# Patient Record
Sex: MALE | Race: WHITE | Employment: OTHER | URBAN - NONMETROPOLITAN AREA
[De-identification: names, ages, dates, MRNs, and addresses within clinical notes are randomized per-mention and may not be internally consistent; named-entity substitution may affect disease eponyms.]

---

## 2017-02-06 ENCOUNTER — OFFICE VISIT (OUTPATIENT)
Dept: CARDIOLOGY | Age: 72
End: 2017-02-06

## 2017-02-06 VITALS
HEIGHT: 68 IN | HEART RATE: 61 BPM | DIASTOLIC BLOOD PRESSURE: 80 MMHG | SYSTOLIC BLOOD PRESSURE: 202 MMHG | BODY MASS INDEX: 34.56 KG/M2 | WEIGHT: 228 LBS

## 2017-02-06 DIAGNOSIS — E78.01 FAMILIAL HYPERCHOLESTEROLEMIA: ICD-10-CM

## 2017-02-06 DIAGNOSIS — I10 ESSENTIAL HYPERTENSION: ICD-10-CM

## 2017-02-06 DIAGNOSIS — R06.02 SOB (SHORTNESS OF BREATH): Primary | ICD-10-CM

## 2017-02-06 DIAGNOSIS — Z95.2 S/P AVR: ICD-10-CM

## 2017-02-06 PROCEDURE — 99213 OFFICE O/P EST LOW 20 MIN: CPT | Performed by: NUCLEAR MEDICINE

## 2017-02-06 PROCEDURE — 4040F PNEUMOC VAC/ADMIN/RCVD: CPT | Performed by: NUCLEAR MEDICINE

## 2017-02-06 PROCEDURE — G8419 CALC BMI OUT NRM PARAM NOF/U: HCPCS | Performed by: NUCLEAR MEDICINE

## 2017-02-06 PROCEDURE — 3017F COLORECTAL CA SCREEN DOC REV: CPT | Performed by: NUCLEAR MEDICINE

## 2017-02-06 PROCEDURE — G8598 ASA/ANTIPLAT THER USED: HCPCS | Performed by: NUCLEAR MEDICINE

## 2017-02-06 PROCEDURE — G8484 FLU IMMUNIZE NO ADMIN: HCPCS | Performed by: NUCLEAR MEDICINE

## 2017-02-06 PROCEDURE — 1036F TOBACCO NON-USER: CPT | Performed by: NUCLEAR MEDICINE

## 2017-02-06 PROCEDURE — 93000 ELECTROCARDIOGRAM COMPLETE: CPT | Performed by: NUCLEAR MEDICINE

## 2017-02-06 PROCEDURE — G8427 DOCREV CUR MEDS BY ELIG CLIN: HCPCS | Performed by: NUCLEAR MEDICINE

## 2017-02-06 PROCEDURE — 1123F ACP DISCUSS/DSCN MKR DOCD: CPT | Performed by: NUCLEAR MEDICINE

## 2017-02-06 RX ORDER — LISINOPRIL 10 MG/1
10 TABLET ORAL 2 TIMES DAILY
Qty: 60 TABLET | Refills: 6 | Status: SHIPPED | OUTPATIENT
Start: 2017-02-06 | End: 2018-01-29 | Stop reason: SDUPTHER

## 2017-02-07 ENCOUNTER — TELEPHONE (OUTPATIENT)
Dept: CARDIOLOGY | Age: 72
End: 2017-02-07

## 2017-02-22 ENCOUNTER — TELEPHONE (OUTPATIENT)
Dept: CARDIOLOGY | Age: 72
End: 2017-02-22

## 2017-04-03 ENCOUNTER — OFFICE VISIT (OUTPATIENT)
Dept: FAMILY MEDICINE CLINIC | Age: 72
End: 2017-04-03

## 2017-04-03 VITALS
DIASTOLIC BLOOD PRESSURE: 62 MMHG | HEIGHT: 68 IN | BODY MASS INDEX: 34.07 KG/M2 | SYSTOLIC BLOOD PRESSURE: 136 MMHG | HEART RATE: 70 BPM | WEIGHT: 224.8 LBS

## 2017-04-03 DIAGNOSIS — I10 ESSENTIAL HYPERTENSION: ICD-10-CM

## 2017-04-03 DIAGNOSIS — I25.10 ASHD (ARTERIOSCLEROTIC HEART DISEASE): ICD-10-CM

## 2017-04-03 DIAGNOSIS — R05.9 COUGH: ICD-10-CM

## 2017-04-03 DIAGNOSIS — M10.00 IDIOPATHIC GOUT, UNSPECIFIED CHRONICITY, UNSPECIFIED SITE: ICD-10-CM

## 2017-04-03 DIAGNOSIS — E78.00 PURE HYPERCHOLESTEROLEMIA: Primary | ICD-10-CM

## 2017-04-03 DIAGNOSIS — M15.9 PRIMARY OSTEOARTHRITIS INVOLVING MULTIPLE JOINTS: ICD-10-CM

## 2017-04-03 PROCEDURE — G8417 CALC BMI ABV UP PARAM F/U: HCPCS | Performed by: FAMILY MEDICINE

## 2017-04-03 PROCEDURE — G8427 DOCREV CUR MEDS BY ELIG CLIN: HCPCS | Performed by: FAMILY MEDICINE

## 2017-04-03 PROCEDURE — 1036F TOBACCO NON-USER: CPT | Performed by: FAMILY MEDICINE

## 2017-04-03 PROCEDURE — 1123F ACP DISCUSS/DSCN MKR DOCD: CPT | Performed by: FAMILY MEDICINE

## 2017-04-03 PROCEDURE — 99213 OFFICE O/P EST LOW 20 MIN: CPT | Performed by: FAMILY MEDICINE

## 2017-04-03 PROCEDURE — G8598 ASA/ANTIPLAT THER USED: HCPCS | Performed by: FAMILY MEDICINE

## 2017-04-03 PROCEDURE — 4040F PNEUMOC VAC/ADMIN/RCVD: CPT | Performed by: FAMILY MEDICINE

## 2017-04-03 PROCEDURE — 3017F COLORECTAL CA SCREEN DOC REV: CPT | Performed by: FAMILY MEDICINE

## 2017-04-03 RX ORDER — WARFARIN SODIUM 2 MG/1
TABLET ORAL
Qty: 270 TABLET | Refills: 1 | Status: SHIPPED | OUTPATIENT
Start: 2017-04-03 | End: 2017-09-29 | Stop reason: SDUPTHER

## 2017-04-03 RX ORDER — ALLOPURINOL 300 MG/1
300 TABLET ORAL DAILY
Qty: 90 TABLET | Refills: 1 | Status: SHIPPED | OUTPATIENT
Start: 2017-04-03 | End: 2017-09-29 | Stop reason: SDUPTHER

## 2017-04-03 RX ORDER — HYDROCHLOROTHIAZIDE 12.5 MG/1
12.5 CAPSULE, GELATIN COATED ORAL EVERY MORNING
Qty: 90 CAPSULE | Refills: 1 | Status: SHIPPED | OUTPATIENT
Start: 2017-04-03 | End: 2017-10-23 | Stop reason: SDUPTHER

## 2017-04-03 RX ORDER — WARFARIN SODIUM 5 MG/1
TABLET ORAL
Qty: 90 TABLET | Refills: 1 | Status: SHIPPED | OUTPATIENT
Start: 2017-04-03 | End: 2018-01-29 | Stop reason: SDUPTHER

## 2017-04-03 RX ORDER — METOPROLOL TARTRATE 50 MG/1
50 TABLET, FILM COATED ORAL 2 TIMES DAILY
Qty: 180 TABLET | Refills: 1 | Status: SHIPPED | OUTPATIENT
Start: 2017-04-03 | End: 2018-01-29 | Stop reason: SDUPTHER

## 2017-04-03 RX ORDER — ATORVASTATIN CALCIUM 80 MG/1
80 TABLET, FILM COATED ORAL DAILY
Qty: 30 TABLET | Refills: 5 | Status: SHIPPED | OUTPATIENT
Start: 2017-04-03 | End: 2018-01-29 | Stop reason: SDUPTHER

## 2017-04-03 ASSESSMENT — PATIENT HEALTH QUESTIONNAIRE - PHQ9
2. FEELING DOWN, DEPRESSED OR HOPELESS: 0
SUM OF ALL RESPONSES TO PHQ QUESTIONS 1-9: 0
SUM OF ALL RESPONSES TO PHQ9 QUESTIONS 1 & 2: 0
1. LITTLE INTEREST OR PLEASURE IN DOING THINGS: 0

## 2017-08-16 ENCOUNTER — OFFICE VISIT (OUTPATIENT)
Dept: CARDIOLOGY CLINIC | Age: 72
End: 2017-08-16
Payer: MEDICARE

## 2017-08-16 VITALS
HEART RATE: 68 BPM | SYSTOLIC BLOOD PRESSURE: 146 MMHG | HEIGHT: 69 IN | BODY MASS INDEX: 30.96 KG/M2 | DIASTOLIC BLOOD PRESSURE: 76 MMHG | WEIGHT: 209 LBS

## 2017-08-16 DIAGNOSIS — E78.01 FAMILIAL HYPERCHOLESTEROLEMIA: ICD-10-CM

## 2017-08-16 DIAGNOSIS — I10 ESSENTIAL HYPERTENSION: Primary | ICD-10-CM

## 2017-08-16 DIAGNOSIS — Z95.2 S/P AVR: ICD-10-CM

## 2017-08-16 PROCEDURE — 1036F TOBACCO NON-USER: CPT | Performed by: NUCLEAR MEDICINE

## 2017-08-16 PROCEDURE — G8427 DOCREV CUR MEDS BY ELIG CLIN: HCPCS | Performed by: NUCLEAR MEDICINE

## 2017-08-16 PROCEDURE — 99213 OFFICE O/P EST LOW 20 MIN: CPT | Performed by: NUCLEAR MEDICINE

## 2017-08-16 PROCEDURE — 3017F COLORECTAL CA SCREEN DOC REV: CPT | Performed by: NUCLEAR MEDICINE

## 2017-08-16 PROCEDURE — 4040F PNEUMOC VAC/ADMIN/RCVD: CPT | Performed by: NUCLEAR MEDICINE

## 2017-08-16 PROCEDURE — G8598 ASA/ANTIPLAT THER USED: HCPCS | Performed by: NUCLEAR MEDICINE

## 2017-08-16 PROCEDURE — 1123F ACP DISCUSS/DSCN MKR DOCD: CPT | Performed by: NUCLEAR MEDICINE

## 2017-08-16 PROCEDURE — G8417 CALC BMI ABV UP PARAM F/U: HCPCS | Performed by: NUCLEAR MEDICINE

## 2017-08-16 RX ORDER — TRIAMCINOLONE ACETONIDE 1 MG/G
CREAM TOPICAL PRN
COMMUNITY

## 2017-09-30 RX ORDER — WARFARIN SODIUM 2 MG/1
TABLET ORAL
Qty: 270 TABLET | Refills: 1 | Status: SHIPPED | OUTPATIENT
Start: 2017-09-30 | End: 2018-01-29 | Stop reason: SDUPTHER

## 2017-09-30 RX ORDER — ALLOPURINOL 300 MG/1
300 TABLET ORAL DAILY
Qty: 90 TABLET | Refills: 1 | Status: SHIPPED | OUTPATIENT
Start: 2017-09-30 | End: 2018-01-29 | Stop reason: SDUPTHER

## 2017-10-23 RX ORDER — HYDROCHLOROTHIAZIDE 12.5 MG/1
CAPSULE, GELATIN COATED ORAL
Qty: 90 CAPSULE | Refills: 1 | Status: SHIPPED | OUTPATIENT
Start: 2017-10-23 | End: 2018-01-29 | Stop reason: SDUPTHER

## 2017-10-23 NOTE — TELEPHONE ENCOUNTER
Leigh Burciaga called requesting a refill on the following medications:  Requested Prescriptions     Pending Prescriptions Disp Refills    hydrochlorothiazide (MICROZIDE) 12.5 MG capsule [Pharmacy Med Name: HYDROCHLOROTHIAZIDE 12.5MG CAP] 90 capsule 1     Sig: TAKE ONE CAPSULE BY MOUTH IN THE MORNING       Date of last visit: Visit date not found  Date of next visit (if applicable):11/13/2017  Date of last refill:   Pharmacy Name: [facility]

## 2018-01-29 ENCOUNTER — OFFICE VISIT (OUTPATIENT)
Dept: FAMILY MEDICINE CLINIC | Age: 73
End: 2018-01-29
Payer: MEDICARE

## 2018-01-29 VITALS
WEIGHT: 204 LBS | HEIGHT: 68 IN | BODY MASS INDEX: 30.92 KG/M2 | SYSTOLIC BLOOD PRESSURE: 132 MMHG | DIASTOLIC BLOOD PRESSURE: 74 MMHG | HEART RATE: 54 BPM

## 2018-01-29 DIAGNOSIS — I25.10 ASCVD (ARTERIOSCLEROTIC CARDIOVASCULAR DISEASE): Chronic | ICD-10-CM

## 2018-01-29 DIAGNOSIS — I10 ESSENTIAL HYPERTENSION: Primary | ICD-10-CM

## 2018-01-29 DIAGNOSIS — Z12.11 SCREENING FOR COLON CANCER: ICD-10-CM

## 2018-01-29 DIAGNOSIS — Z23 NEED FOR VACCINATION FOR STREP PNEUMONIAE: ICD-10-CM

## 2018-01-29 DIAGNOSIS — E78.00 PURE HYPERCHOLESTEROLEMIA: ICD-10-CM

## 2018-01-29 PROCEDURE — G8484 FLU IMMUNIZE NO ADMIN: HCPCS | Performed by: FAMILY MEDICINE

## 2018-01-29 PROCEDURE — G8598 ASA/ANTIPLAT THER USED: HCPCS | Performed by: FAMILY MEDICINE

## 2018-01-29 PROCEDURE — G0009 ADMIN PNEUMOCOCCAL VACCINE: HCPCS | Performed by: FAMILY MEDICINE

## 2018-01-29 PROCEDURE — 90670 PCV13 VACCINE IM: CPT | Performed by: FAMILY MEDICINE

## 2018-01-29 PROCEDURE — G8427 DOCREV CUR MEDS BY ELIG CLIN: HCPCS | Performed by: FAMILY MEDICINE

## 2018-01-29 PROCEDURE — 1123F ACP DISCUSS/DSCN MKR DOCD: CPT | Performed by: FAMILY MEDICINE

## 2018-01-29 PROCEDURE — 4040F PNEUMOC VAC/ADMIN/RCVD: CPT | Performed by: FAMILY MEDICINE

## 2018-01-29 PROCEDURE — 1036F TOBACCO NON-USER: CPT | Performed by: FAMILY MEDICINE

## 2018-01-29 PROCEDURE — G8417 CALC BMI ABV UP PARAM F/U: HCPCS | Performed by: FAMILY MEDICINE

## 2018-01-29 PROCEDURE — 3017F COLORECTAL CA SCREEN DOC REV: CPT | Performed by: FAMILY MEDICINE

## 2018-01-29 PROCEDURE — 99214 OFFICE O/P EST MOD 30 MIN: CPT | Performed by: FAMILY MEDICINE

## 2018-01-29 RX ORDER — ALLOPURINOL 300 MG/1
300 TABLET ORAL DAILY
Qty: 90 TABLET | Refills: 1 | Status: SHIPPED | OUTPATIENT
Start: 2018-01-29 | End: 2018-07-31 | Stop reason: SDUPTHER

## 2018-01-29 RX ORDER — WARFARIN SODIUM 5 MG/1
TABLET ORAL
Qty: 90 TABLET | Refills: 1 | Status: SHIPPED | OUTPATIENT
Start: 2018-01-29 | End: 2018-07-31 | Stop reason: SDUPTHER

## 2018-01-29 RX ORDER — WARFARIN SODIUM 2 MG/1
TABLET ORAL
Qty: 270 TABLET | Refills: 1 | Status: SHIPPED | OUTPATIENT
Start: 2018-01-29 | End: 2018-07-31 | Stop reason: SDUPTHER

## 2018-01-29 RX ORDER — ATORVASTATIN CALCIUM 80 MG/1
80 TABLET, FILM COATED ORAL DAILY
Qty: 90 TABLET | Refills: 1 | Status: SHIPPED | OUTPATIENT
Start: 2018-01-29 | End: 2018-07-31 | Stop reason: SDUPTHER

## 2018-01-29 RX ORDER — METOPROLOL TARTRATE 50 MG/1
50 TABLET, FILM COATED ORAL 2 TIMES DAILY
Qty: 180 TABLET | Refills: 1 | Status: SHIPPED | OUTPATIENT
Start: 2018-01-29 | End: 2018-07-31 | Stop reason: SDUPTHER

## 2018-01-29 RX ORDER — LISINOPRIL 10 MG/1
10 TABLET ORAL 2 TIMES DAILY
Qty: 180 TABLET | Refills: 1 | Status: SHIPPED | OUTPATIENT
Start: 2018-01-29 | End: 2018-07-31 | Stop reason: SDUPTHER

## 2018-01-29 RX ORDER — HYDROCHLOROTHIAZIDE 12.5 MG/1
CAPSULE, GELATIN COATED ORAL
Qty: 90 CAPSULE | Refills: 1 | Status: SHIPPED | OUTPATIENT
Start: 2018-01-29 | End: 2018-07-31 | Stop reason: SDUPTHER

## 2018-01-29 ASSESSMENT — ENCOUNTER SYMPTOMS
CHEST TIGHTNESS: 0
COLOR CHANGE: 0
NAUSEA: 0
BLOOD IN STOOL: 0
VOMITING: 0
EYE REDNESS: 0
SHORTNESS OF BREATH: 0
EYE DISCHARGE: 0
DIARRHEA: 0

## 2018-01-29 NOTE — PROGRESS NOTES
[address] [place] [address], [address]  Phone:  [phone]  Fax:  [phone]       Name: Lakesha Graner  : 1945    Chief Complaint   Patient presents with    6 Month Follow-Up     some pain in chest with breathing cold air    Hyperlipidemia     Due for colon CA screening and PNA vaccine    Hypertension       HPI:     Hang Nicolas is a 72 yo male who presents today for follow-up of hypertension, hyperlipidemia, and gout. His last lipid profile in September was controlled on Lipitor. He states that he's been having some discomfort in his chest but only when breathing in cold air. Hyperlipidemia   This is a chronic problem. The current episode started more than 1 year ago. The problem is controlled. Recent lipid tests were reviewed and are normal. Associated symptoms include chest pain (only with inhalation of very cold air). Pertinent negatives include no myalgias or shortness of breath. Current antihyperlipidemic treatment includes statins. The current treatment provides moderate improvement of lipids. There are no compliance problems. Risk factors for coronary artery disease include male sex, hypertension and dyslipidemia. Hypertension   This is a chronic problem. The current episode started more than 1 year ago. The problem is unchanged. The problem is controlled. Associated symptoms include chest pain (only with inhalation of very cold air). Pertinent negatives include no anxiety, headaches, palpitations, peripheral edema or shortness of breath. There are no associated agents to hypertension. Past treatments include diuretics and ACE inhibitors. The current treatment provides moderate improvement. There are no compliance problems.         Current Outpatient Prescriptions:     allopurinol (ZYLOPRIM) 300 MG tablet, Take 1 tablet by mouth daily, Disp: 90 tablet, Rfl: 1    hydrochlorothiazide (MICROZIDE) 12.5 MG capsule, TAKE ONE CAPSULE BY MOUTH IN THE

## 2018-05-08 ENCOUNTER — OFFICE VISIT (OUTPATIENT)
Dept: FAMILY MEDICINE CLINIC | Age: 73
End: 2018-05-08
Payer: MEDICARE

## 2018-05-08 VITALS
WEIGHT: 203 LBS | BODY MASS INDEX: 30.77 KG/M2 | HEIGHT: 68 IN | DIASTOLIC BLOOD PRESSURE: 70 MMHG | HEART RATE: 62 BPM | TEMPERATURE: 97.9 F | SYSTOLIC BLOOD PRESSURE: 118 MMHG

## 2018-05-08 DIAGNOSIS — B96.89 ACUTE BACTERIAL SINUSITIS: Primary | ICD-10-CM

## 2018-05-08 DIAGNOSIS — J01.90 ACUTE BACTERIAL SINUSITIS: Primary | ICD-10-CM

## 2018-05-08 PROCEDURE — 3017F COLORECTAL CA SCREEN DOC REV: CPT | Performed by: FAMILY MEDICINE

## 2018-05-08 PROCEDURE — G8510 SCR DEP NEG, NO PLAN REQD: HCPCS | Performed by: FAMILY MEDICINE

## 2018-05-08 PROCEDURE — G8598 ASA/ANTIPLAT THER USED: HCPCS | Performed by: FAMILY MEDICINE

## 2018-05-08 PROCEDURE — G8427 DOCREV CUR MEDS BY ELIG CLIN: HCPCS | Performed by: FAMILY MEDICINE

## 2018-05-08 PROCEDURE — G8417 CALC BMI ABV UP PARAM F/U: HCPCS | Performed by: FAMILY MEDICINE

## 2018-05-08 PROCEDURE — 1123F ACP DISCUSS/DSCN MKR DOCD: CPT | Performed by: FAMILY MEDICINE

## 2018-05-08 PROCEDURE — 1036F TOBACCO NON-USER: CPT | Performed by: FAMILY MEDICINE

## 2018-05-08 PROCEDURE — 4040F PNEUMOC VAC/ADMIN/RCVD: CPT | Performed by: FAMILY MEDICINE

## 2018-05-08 PROCEDURE — 3288F FALL RISK ASSESSMENT DOCD: CPT | Performed by: FAMILY MEDICINE

## 2018-05-08 PROCEDURE — 99213 OFFICE O/P EST LOW 20 MIN: CPT | Performed by: FAMILY MEDICINE

## 2018-05-08 RX ORDER — PREDNISONE 20 MG/1
40 TABLET ORAL DAILY
Qty: 10 TABLET | Refills: 0 | Status: SHIPPED | OUTPATIENT
Start: 2018-05-08 | End: 2018-07-31

## 2018-05-08 ASSESSMENT — ENCOUNTER SYMPTOMS
COUGH: 1
WHEEZING: 1
SORE THROAT: 1
RHINORRHEA: 1
SHORTNESS OF BREATH: 0
HEMOPTYSIS: 0

## 2018-05-08 ASSESSMENT — PATIENT HEALTH QUESTIONNAIRE - PHQ9
2. FEELING DOWN, DEPRESSED OR HOPELESS: 0
SUM OF ALL RESPONSES TO PHQ QUESTIONS 1-9: 0
1. LITTLE INTEREST OR PLEASURE IN DOING THINGS: 0
SUM OF ALL RESPONSES TO PHQ9 QUESTIONS 1 & 2: 0

## 2018-07-31 ENCOUNTER — OFFICE VISIT (OUTPATIENT)
Dept: FAMILY MEDICINE CLINIC | Age: 73
End: 2018-07-31
Payer: MEDICARE

## 2018-07-31 VITALS
SYSTOLIC BLOOD PRESSURE: 128 MMHG | WEIGHT: 215 LBS | HEIGHT: 68 IN | HEART RATE: 62 BPM | BODY MASS INDEX: 32.58 KG/M2 | DIASTOLIC BLOOD PRESSURE: 72 MMHG

## 2018-07-31 DIAGNOSIS — E78.00 PURE HYPERCHOLESTEROLEMIA: ICD-10-CM

## 2018-07-31 DIAGNOSIS — G56.01 ACUTE CARPAL TUNNEL SYNDROME OF RIGHT WRIST: ICD-10-CM

## 2018-07-31 DIAGNOSIS — M10.00 IDIOPATHIC GOUT, UNSPECIFIED CHRONICITY, UNSPECIFIED SITE: ICD-10-CM

## 2018-07-31 DIAGNOSIS — I10 ESSENTIAL HYPERTENSION: Primary | ICD-10-CM

## 2018-07-31 DIAGNOSIS — I25.10 ASCVD (ARTERIOSCLEROTIC CARDIOVASCULAR DISEASE): ICD-10-CM

## 2018-07-31 PROCEDURE — G8427 DOCREV CUR MEDS BY ELIG CLIN: HCPCS | Performed by: FAMILY MEDICINE

## 2018-07-31 PROCEDURE — 99214 OFFICE O/P EST MOD 30 MIN: CPT | Performed by: FAMILY MEDICINE

## 2018-07-31 PROCEDURE — G8417 CALC BMI ABV UP PARAM F/U: HCPCS | Performed by: FAMILY MEDICINE

## 2018-07-31 PROCEDURE — 4040F PNEUMOC VAC/ADMIN/RCVD: CPT | Performed by: FAMILY MEDICINE

## 2018-07-31 PROCEDURE — 1101F PT FALLS ASSESS-DOCD LE1/YR: CPT | Performed by: FAMILY MEDICINE

## 2018-07-31 PROCEDURE — G8598 ASA/ANTIPLAT THER USED: HCPCS | Performed by: FAMILY MEDICINE

## 2018-07-31 PROCEDURE — 1036F TOBACCO NON-USER: CPT | Performed by: FAMILY MEDICINE

## 2018-07-31 PROCEDURE — 3017F COLORECTAL CA SCREEN DOC REV: CPT | Performed by: FAMILY MEDICINE

## 2018-07-31 PROCEDURE — 1123F ACP DISCUSS/DSCN MKR DOCD: CPT | Performed by: FAMILY MEDICINE

## 2018-07-31 RX ORDER — HYDROCHLOROTHIAZIDE 12.5 MG/1
CAPSULE, GELATIN COATED ORAL
Qty: 90 CAPSULE | Refills: 1 | Status: SHIPPED | OUTPATIENT
Start: 2018-07-31 | End: 2018-10-22 | Stop reason: DRUGHIGH

## 2018-07-31 RX ORDER — LISINOPRIL 10 MG/1
10 TABLET ORAL 2 TIMES DAILY
Qty: 180 TABLET | Refills: 1 | Status: SHIPPED | OUTPATIENT
Start: 2018-07-31 | End: 2019-02-04 | Stop reason: SDUPTHER

## 2018-07-31 RX ORDER — ALLOPURINOL 300 MG/1
300 TABLET ORAL DAILY
Qty: 90 TABLET | Refills: 1 | Status: SHIPPED | OUTPATIENT
Start: 2018-07-31 | End: 2019-02-04 | Stop reason: SDUPTHER

## 2018-07-31 RX ORDER — WARFARIN SODIUM 2 MG/1
TABLET ORAL
Qty: 270 TABLET | Refills: 1 | Status: SHIPPED | OUTPATIENT
Start: 2018-07-31 | End: 2018-10-09 | Stop reason: SDUPTHER

## 2018-07-31 RX ORDER — METOPROLOL TARTRATE 50 MG/1
50 TABLET, FILM COATED ORAL 2 TIMES DAILY
Qty: 180 TABLET | Refills: 1 | Status: SHIPPED | OUTPATIENT
Start: 2018-07-31 | End: 2019-02-04 | Stop reason: SDUPTHER

## 2018-07-31 RX ORDER — ATORVASTATIN CALCIUM 80 MG/1
80 TABLET, FILM COATED ORAL DAILY
Qty: 90 TABLET | Refills: 1 | Status: SHIPPED | OUTPATIENT
Start: 2018-07-31 | End: 2018-11-13 | Stop reason: SDUPTHER

## 2018-07-31 RX ORDER — WARFARIN SODIUM 5 MG/1
TABLET ORAL
Qty: 90 TABLET | Refills: 1 | Status: SHIPPED | OUTPATIENT
Start: 2018-07-31 | End: 2018-10-09 | Stop reason: SDUPTHER

## 2018-07-31 ASSESSMENT — ENCOUNTER SYMPTOMS
VOMITING: 0
EYE REDNESS: 0
BLOOD IN STOOL: 0
NAUSEA: 0
CHEST TIGHTNESS: 0
SHORTNESS OF BREATH: 0
DIARRHEA: 0
EYE DISCHARGE: 0
COLOR CHANGE: 0

## 2018-07-31 NOTE — PROGRESS NOTES
appears well-developed and well-nourished. No distress. HENT:   Head: Normocephalic and atraumatic. Nose: Nose normal.   Eyes: Conjunctivae and EOM are normal.   Neck: Normal range of motion. Neck supple. Cardiovascular: Normal rate and regular rhythm. Pulmonary/Chest: Effort normal and breath sounds normal. No respiratory distress. He has no wheezes. Abdominal: Soft. Bowel sounds are normal.   Musculoskeletal:        Right hand: He exhibits decreased range of motion. He exhibits normal capillary refill. Normal sensation noted. Decreased strength noted. Neurological: He is alert and oriented to person, place, and time. Skin: Skin is warm and dry. No rash noted. Scar on right forearm from compartment syndrome. Psychiatric: He has a normal mood and affect. His behavior is normal.   Vitals reviewed. Assessment/Plan:     [facility] was seen today for 6 month follow-up, hypertension and hyperlipidemia. Diagnoses and all orders for this visit:    Essential hypertension  -     hydrochlorothiazide (MICROZIDE) 12.5 MG capsule; TAKE ONE CAPSULE BY MOUTH IN THE MORNING  -     metoprolol tartrate (LOPRESSOR) 50 MG tablet; Take 1 tablet by mouth 2 times daily  -     lisinopril (PRINIVIL;ZESTRIL) 10 MG tablet; Take 1 tablet by mouth 2 times daily    Pure hypercholesterolemia  -     atorvastatin (LIPITOR) 80 MG tablet; Take 1 tablet by mouth daily    ASCVD (arteriosclerotic cardiovascular disease)  -     warfarin (COUMADIN) 2 MG tablet; AS DIRECTED PER DR EATON  -     warfarin (COUMADIN) 5 MG tablet; AS DIRECTED PER DR EATON    Idiopathic gout, unspecified chronicity, unspecified site    Acute carpal tunnel syndrome of right wrist  -     [facility] MD [place]    Other orders  -     allopurinol (ZYLOPRIM) 300 MG tablet; Take 1 tablet by mouth daily    BP is stable so will continue with current medication. Will be due for labs at the health [facility] in September.   Referral given to [facility] for right carpal tunnel syndrome. Return in about 6 months (around 1/31/2019) for hypertension, hyperlipidemia.     Electronically signed by Salo Nuñez MD on 7/31/2018 at 4:43 PM

## 2018-08-15 ENCOUNTER — OFFICE VISIT (OUTPATIENT)
Dept: CARDIOLOGY CLINIC | Age: 73
End: 2018-08-15
Payer: MEDICARE

## 2018-08-15 VITALS
SYSTOLIC BLOOD PRESSURE: 150 MMHG | DIASTOLIC BLOOD PRESSURE: 78 MMHG | WEIGHT: 214.9 LBS | BODY MASS INDEX: 31.83 KG/M2 | HEART RATE: 57 BPM | HEIGHT: 69 IN

## 2018-08-15 DIAGNOSIS — Z95.2 S/P AVR: ICD-10-CM

## 2018-08-15 DIAGNOSIS — I25.10 ASHD (ARTERIOSCLEROTIC HEART DISEASE): Chronic | ICD-10-CM

## 2018-08-15 DIAGNOSIS — I25.10 ASCVD (ARTERIOSCLEROTIC CARDIOVASCULAR DISEASE): Primary | Chronic | ICD-10-CM

## 2018-08-15 PROCEDURE — 4040F PNEUMOC VAC/ADMIN/RCVD: CPT | Performed by: NUCLEAR MEDICINE

## 2018-08-15 PROCEDURE — G8427 DOCREV CUR MEDS BY ELIG CLIN: HCPCS | Performed by: NUCLEAR MEDICINE

## 2018-08-15 PROCEDURE — 1123F ACP DISCUSS/DSCN MKR DOCD: CPT | Performed by: NUCLEAR MEDICINE

## 2018-08-15 PROCEDURE — 1101F PT FALLS ASSESS-DOCD LE1/YR: CPT | Performed by: NUCLEAR MEDICINE

## 2018-08-15 PROCEDURE — G8598 ASA/ANTIPLAT THER USED: HCPCS | Performed by: NUCLEAR MEDICINE

## 2018-08-15 PROCEDURE — 3017F COLORECTAL CA SCREEN DOC REV: CPT | Performed by: NUCLEAR MEDICINE

## 2018-08-15 PROCEDURE — 93000 ELECTROCARDIOGRAM COMPLETE: CPT | Performed by: NUCLEAR MEDICINE

## 2018-08-15 PROCEDURE — 1036F TOBACCO NON-USER: CPT | Performed by: NUCLEAR MEDICINE

## 2018-08-15 PROCEDURE — 99213 OFFICE O/P EST LOW 20 MIN: CPT | Performed by: NUCLEAR MEDICINE

## 2018-08-15 PROCEDURE — G8417 CALC BMI ABV UP PARAM F/U: HCPCS | Performed by: NUCLEAR MEDICINE

## 2018-08-15 NOTE — PROGRESS NOTES
[facility]  [address]  [address] [address]  Dept: [phone]  Dept Fax: [phone]  Loc: [phone]    Visit Date: 8/15/2018    Patt Kent is a 68 y.o. male who presents today for:  Chief Complaint   Patient presents with    Check-Up    Hypertension    Cardiac Valve Problem   known AVR  Better BP at home  No chest pain   No changes in breathing  Some more dyspnea at times with exertion         HPI:  HPI  Past Medical History:   Diagnosis Date    CAD (coronary artery disease)     Clotting disorder (HCC)     anemic    Compartment syndrome (HCC)     Right arm    Heart murmur     Hyperlipidemia     Hypertension     Ventricular hypertrophy     left      Past Surgical History:   Procedure Laterality Date    AORTIC VALVE REPLACEMENT      ARM SURGERY Right     Compartment syndrome    CARPAL TUNNEL RELEASE Bilateral     CORONARY ARTERY BYPASS GRAFT      DIAGNOSTIC CARDIAC CATH LAB PROCEDURE       Family History   Problem Relation Age of Onset    Alzheimer's Disease Mother     Cancer Father     Heart Disease Brother     Hypertension Brother     Stroke Brother      Social History   Substance Use Topics    Smoking status: Never Smoker    Smokeless tobacco: Never Used    Alcohol use Yes      Comment: occasionally      Current Outpatient Prescriptions   Medication Sig Dispense Refill    allopurinol (ZYLOPRIM) 300 MG tablet Take 1 tablet by mouth daily 90 tablet 1    hydrochlorothiazide (MICROZIDE) 12.5 MG capsule TAKE ONE CAPSULE BY MOUTH IN THE MORNING 90 capsule 1    warfarin (COUMADIN) 2 MG tablet AS DIRECTED PER DR EATON 270 tablet 1    warfarin (COUMADIN) 5 MG tablet AS DIRECTED PER DR EATON 90 tablet 1    metoprolol tartrate (LOPRESSOR) 50 MG tablet Take 1 tablet by mouth 2 times daily 180 tablet 1    atorvastatin (LIPITOR) 80 MG tablet Take 1 tablet by mouth daily 90 tablet 1    lisinopril (PRINIVIL;ZESTRIL) 10 MG EKG 12 lead   3. S/P AVR     cardiac fair for now   ECG in office was done today. I reviewed the ECG. No acute findings    Plan:  No Follow-up on file. As above  Monitor Bp at home  Continue risk factor modification and medical management  Thank you for allowing me to participate in the care of your patient. Please don't hesitate to contact me regarding any further issues related to the patient care    Orders Placed:  Orders Placed This Encounter   Procedures    EKG 12 lead     Order Specific Question:   Reason for Exam?     Answer: Other       Medications Prescribed:  No orders of the defined types were placed in this encounter. Discussed use, benefit, and side effects of prescribed medications. All patient questions answered. Pt voiced understanding. Instructed to continue current medications, diet and exercise. Continue risk factor modification and medical management. Patient agreed with treatment plan. Follow up as directed.     Electronically signed by Shereen Anderson MD on 8/15/2018 at 9:23 AM

## 2018-09-20 ENCOUNTER — PROCEDURE VISIT (OUTPATIENT)
Dept: NEUROLOGY | Age: 73
End: 2018-09-20
Payer: MEDICARE

## 2018-09-20 DIAGNOSIS — G62.9 NEUROPATHY: ICD-10-CM

## 2018-09-20 DIAGNOSIS — R20.0 BILATERAL HAND NUMBNESS: ICD-10-CM

## 2018-09-20 DIAGNOSIS — G56.03 BILATERAL CARPAL TUNNEL SYNDROME: Primary | ICD-10-CM

## 2018-09-20 PROCEDURE — 95886 MUSC TEST DONE W/N TEST COMP: CPT | Performed by: PSYCHIATRY & NEUROLOGY

## 2018-09-20 PROCEDURE — 95911 NRV CNDJ TEST 9-10 STUDIES: CPT | Performed by: PSYCHIATRY & NEUROLOGY

## 2018-10-09 DIAGNOSIS — I25.10 ASCVD (ARTERIOSCLEROTIC CARDIOVASCULAR DISEASE): ICD-10-CM

## 2018-10-09 RX ORDER — WARFARIN SODIUM 2 MG/1
TABLET ORAL
Qty: 270 TABLET | Refills: 1 | Status: SHIPPED | OUTPATIENT
Start: 2018-10-09 | End: 2019-02-04 | Stop reason: SDUPTHER

## 2018-10-09 RX ORDER — WARFARIN SODIUM 5 MG/1
TABLET ORAL
Qty: 90 TABLET | Refills: 1 | Status: SHIPPED | OUTPATIENT
Start: 2018-10-09 | End: 2019-02-04 | Stop reason: SDUPTHER

## 2018-10-22 ENCOUNTER — TELEPHONE (OUTPATIENT)
Dept: FAMILY MEDICINE CLINIC | Age: 73
End: 2018-10-22

## 2018-10-22 ENCOUNTER — OFFICE VISIT (OUTPATIENT)
Dept: FAMILY MEDICINE CLINIC | Age: 73
End: 2018-10-22
Payer: MEDICARE

## 2018-10-22 ENCOUNTER — HOSPITAL ENCOUNTER (OUTPATIENT)
Age: 73
Discharge: HOME | End: 2018-10-22
Payer: MEDICARE

## 2018-10-22 VITALS
SYSTOLIC BLOOD PRESSURE: 162 MMHG | WEIGHT: 214 LBS | DIASTOLIC BLOOD PRESSURE: 70 MMHG | BODY MASS INDEX: 32.43 KG/M2 | HEART RATE: 48 BPM | TEMPERATURE: 98.6 F | HEIGHT: 68 IN

## 2018-10-22 DIAGNOSIS — R19.7 DIARRHEA OF PRESUMED INFECTIOUS ORIGIN: ICD-10-CM

## 2018-10-22 DIAGNOSIS — R09.89 PULSE IRREGULARITY: ICD-10-CM

## 2018-10-22 DIAGNOSIS — B96.89 ACUTE BACTERIAL SINUSITIS: ICD-10-CM

## 2018-10-22 DIAGNOSIS — J01.90 ACUTE BACTERIAL SINUSITIS: ICD-10-CM

## 2018-10-22 DIAGNOSIS — I25.10 ASCVD (ARTERIOSCLEROTIC CARDIOVASCULAR DISEASE): Chronic | ICD-10-CM

## 2018-10-22 DIAGNOSIS — J01.90 ACUTE BACTERIAL SINUSITIS: Primary | ICD-10-CM

## 2018-10-22 DIAGNOSIS — I10 ESSENTIAL HYPERTENSION: ICD-10-CM

## 2018-10-22 DIAGNOSIS — B96.89 ACUTE BACTERIAL SINUSITIS: Primary | ICD-10-CM

## 2018-10-22 DIAGNOSIS — R00.1 BRADYCARDIA: ICD-10-CM

## 2018-10-22 DIAGNOSIS — I10 UNCONTROLLED HYPERTENSION: ICD-10-CM

## 2018-10-22 LAB
ALBUMIN SERPL-MCNC: 4.1 G/DL (ref 3.5–5.1)
ALP BLD-CCNC: 78 U/L (ref 38–126)
ALT SERPL-CCNC: 35 U/L (ref 11–66)
ANION GAP SERPL CALCULATED.3IONS-SCNC: 12 MEQ/L (ref 8–16)
AST SERPL-CCNC: 24 U/L (ref 5–40)
BASOPHILS # BLD: 1 %
BASOPHILS ABSOLUTE: 0.1 THOU/MM3 (ref 0–0.1)
BILIRUB SERPL-MCNC: 0.6 MG/DL (ref 0.3–1.2)
BUN BLDV-MCNC: 12 MG/DL (ref 7–22)
CALCIUM SERPL-MCNC: 9.6 MG/DL (ref 8.5–10.5)
CHLORIDE BLD-SCNC: 104 MEQ/L (ref 98–111)
CHOLESTEROL, TOTAL: 170 MG/DL (ref 100–199)
CO2: 28 MEQ/L (ref 23–33)
CREAT SERPL-MCNC: 0.7 MG/DL (ref 0.4–1.2)
EKG ATRIAL RATE: 48 BPM
EKG P AXIS: 71 DEGREES
EKG P-R INTERVAL: 408 MS
EKG Q-T INTERVAL: 492 MS
EKG QRS DURATION: 104 MS
EKG QTC CALCULATION (BAZETT): 439 MS
EKG R AXIS: 39 DEGREES
EKG T AXIS: 49 DEGREES
EKG VENTRICULAR RATE: 48 BPM
EOSINOPHIL # BLD: 6.4 %
EOSINOPHILS ABSOLUTE: 0.6 THOU/MM3 (ref 0–0.4)
ERYTHROCYTE [DISTWIDTH] IN BLOOD BY AUTOMATED COUNT: 13.4 % (ref 11.5–14.5)
ERYTHROCYTE [DISTWIDTH] IN BLOOD BY AUTOMATED COUNT: 44.6 FL (ref 35–45)
GFR SERPL CREATININE-BSD FRML MDRD: > 90 ML/MIN/1.73M2
GLUCOSE BLD-MCNC: 138 MG/DL (ref 70–108)
HCT VFR BLD CALC: 43.1 % (ref 42–52)
HDLC SERPL-MCNC: 34 MG/DL
HEMOGLOBIN: 14.7 GM/DL (ref 14–18)
IMMATURE GRANS (ABS): 0.02 THOU/MM3 (ref 0–0.07)
IMMATURE GRANULOCYTES: 0.2 %
LDL CHOLESTEROL CALCULATED: 116 MG/DL
LYMPHOCYTES # BLD: 26.3 %
LYMPHOCYTES ABSOLUTE: 2.4 THOU/MM3 (ref 1–4.8)
MCH RBC QN AUTO: 31.3 PG (ref 26–33)
MCHC RBC AUTO-ENTMCNC: 34.1 GM/DL (ref 32.2–35.5)
MCV RBC AUTO: 91.9 FL (ref 80–94)
MONOCYTES # BLD: 10 %
MONOCYTES ABSOLUTE: 0.9 THOU/MM3 (ref 0.4–1.3)
NUCLEATED RED BLOOD CELLS: 0 /100 WBC
PLATELET # BLD: 207 THOU/MM3 (ref 130–400)
PMV BLD AUTO: 11.1 FL (ref 9.4–12.4)
POTASSIUM SERPL-SCNC: 4.8 MEQ/L (ref 3.5–5.2)
RBC # BLD: 4.69 MILL/MM3 (ref 4.7–6.1)
SEG NEUTROPHILS: 56.1 %
SEGMENTED NEUTROPHILS ABSOLUTE COUNT: 5.1 THOU/MM3 (ref 1.8–7.7)
SODIUM BLD-SCNC: 144 MEQ/L (ref 135–145)
TOTAL PROTEIN: 7.3 G/DL (ref 6.1–8)
TRIGL SERPL-MCNC: 98 MG/DL (ref 0–199)
WBC # BLD: 9.1 THOU/MM3 (ref 4.8–10.8)

## 2018-10-22 PROCEDURE — 4040F PNEUMOC VAC/ADMIN/RCVD: CPT | Performed by: FAMILY MEDICINE

## 2018-10-22 PROCEDURE — 99214 OFFICE O/P EST MOD 30 MIN: CPT | Performed by: FAMILY MEDICINE

## 2018-10-22 PROCEDURE — G8484 FLU IMMUNIZE NO ADMIN: HCPCS | Performed by: FAMILY MEDICINE

## 2018-10-22 PROCEDURE — 1101F PT FALLS ASSESS-DOCD LE1/YR: CPT | Performed by: FAMILY MEDICINE

## 2018-10-22 PROCEDURE — 3017F COLORECTAL CA SCREEN DOC REV: CPT | Performed by: FAMILY MEDICINE

## 2018-10-22 PROCEDURE — G8417 CALC BMI ABV UP PARAM F/U: HCPCS | Performed by: FAMILY MEDICINE

## 2018-10-22 PROCEDURE — G8598 ASA/ANTIPLAT THER USED: HCPCS | Performed by: FAMILY MEDICINE

## 2018-10-22 PROCEDURE — 80061 LIPID PANEL: CPT

## 2018-10-22 PROCEDURE — 93005 ELECTROCARDIOGRAM TRACING: CPT

## 2018-10-22 PROCEDURE — 36415 COLL VENOUS BLD VENIPUNCTURE: CPT

## 2018-10-22 PROCEDURE — 1123F ACP DISCUSS/DSCN MKR DOCD: CPT | Performed by: FAMILY MEDICINE

## 2018-10-22 PROCEDURE — 80053 COMPREHEN METABOLIC PANEL: CPT

## 2018-10-22 PROCEDURE — 85025 COMPLETE CBC W/AUTO DIFF WBC: CPT

## 2018-10-22 PROCEDURE — 1036F TOBACCO NON-USER: CPT | Performed by: FAMILY MEDICINE

## 2018-10-22 PROCEDURE — G8427 DOCREV CUR MEDS BY ELIG CLIN: HCPCS | Performed by: FAMILY MEDICINE

## 2018-10-22 RX ORDER — HYDROCHLOROTHIAZIDE 12.5 MG/1
CAPSULE, GELATIN COATED ORAL
Qty: 90 CAPSULE | Refills: 1 | Status: SHIPPED
Start: 2018-10-22 | End: 2019-02-04

## 2018-10-22 RX ORDER — AMOXICILLIN AND CLAVULANATE POTASSIUM 875; 125 MG/1; MG/1
1 TABLET, FILM COATED ORAL 2 TIMES DAILY
Qty: 20 TABLET | Refills: 0 | Status: SHIPPED | OUTPATIENT
Start: 2018-10-22 | End: 2018-11-01

## 2018-10-22 ASSESSMENT — ENCOUNTER SYMPTOMS
SORE THROAT: 0
RHINORRHEA: 1
DIARRHEA: 1
EYE REDNESS: 0
COUGH: 1
SINUS PRESSURE: 1
EYE DISCHARGE: 0
COLOR CHANGE: 0
VOMITING: 0
SWOLLEN GLANDS: 0
SHORTNESS OF BREATH: 1
NAUSEA: 0

## 2018-10-22 NOTE — PROGRESS NOTES
[address] [place] [address] Rd, [address], [place]  Phone:  [phone]  XEK:[phone]       Name: Geovanny Quiles  : 1945    Chief Complaint   Patient presents with    Chest Congestion     diarrhea, no fevers, all started 1 month ago     Cough    Congestion    Fatigue    Nasal Congestion       HPI:     Geovanny Quiles is a 68 y.o. male who presents today for evaluation of cough, chest congestion, nasal congestion, and fatigue for the past month. He's also developed non-bloody diarrhea over the past week, about 3x/day. He just feels run down. His blood pressure is elevated today but he states it was good at Ortho last week. Sinusitis   This is a new problem. The current episode started 1 to 4 weeks ago. The problem is unchanged. There has been no fever. Associated symptoms include congestion, coughing, headaches, shortness of breath and sinus pressure. Pertinent negatives include no chills, sore throat or swollen glands. Past treatments include oral decongestants. The treatment provided mild relief.        Current Outpatient Prescriptions:     hydrochlorothiazide (MICROZIDE) 12.5 MG capsule, TAKE TWO CAPSULES BY MOUTH IN THE MORNING, Disp: 90 capsule, Rfl: 1    amoxicillin-clavulanate (AUGMENTIN) 875-125 MG per tablet, Take 1 tablet by mouth 2 times daily for 10 days, Disp: 20 tablet, Rfl: 0    warfarin (COUMADIN) 2 MG tablet, AS DIRECTED PER DR EATON, Disp: 270 tablet, Rfl: 1    warfarin (COUMADIN) 5 MG tablet, AS DIRECTED PER DR EATON, Disp: 90 tablet, Rfl: 1    allopurinol (ZYLOPRIM) 300 MG tablet, Take 1 tablet by mouth daily, Disp: 90 tablet, Rfl: 1    metoprolol tartrate (LOPRESSOR) 50 MG tablet, Take 1 tablet by mouth 2 times daily, Disp: 180 tablet, Rfl: 1    atorvastatin (LIPITOR) 80 MG tablet, Take 1 tablet by mouth daily, Disp: 90 tablet, Rfl: 1    lisinopril (PRINIVIL;ZESTRIL) 10 MG tablet, Take 1 tablet by mouth 2 times daily, Disp: 180 tablet, Rfl: 1   triamcinolone (KENALOG) 0.1 % cream, Apply topically as needed Apply topically 2 times daily. , Disp: , Rfl:     aspirin 81 MG EC tablet, Take 81 mg by mouth daily. , Disp: , Rfl:     Allergies   Allergen Reactions    Erythromycin Hives    Sulfa Antibiotics Other (See Comments)     PT DOESN'T REMEMBER    Vasotec [Enalapril] Other (See Comments)     cough       Subjective:      Review of Systems   Constitutional: Positive for fatigue. Negative for chills and fever. HENT: Positive for congestion, rhinorrhea and sinus pressure. Negative for sore throat. Eyes: Negative for discharge and redness. Respiratory: Positive for cough and shortness of breath. Cardiovascular: Negative for chest pain and palpitations. Gastrointestinal: Positive for diarrhea. Negative for nausea and vomiting. Musculoskeletal: Positive for arthralgias and myalgias. Skin: Negative for color change and pallor. Neurological: Positive for headaches. Negative for dizziness. Hematological: Negative for adenopathy. Bruises/bleeds easily. Objective:     BP (!) 162/70   Pulse (!) 48   Temp 98.6 °F (37 °C)   Ht 5' 8\" (1.727 m)   Wt 214 lb (97.1 kg)   BMI 32.54 kg/m²     Physical Exam   Constitutional: He is oriented to person, place, and time. He appears well-developed. HENT:   Head: Normocephalic and atraumatic. Right Ear: Tympanic membrane, external ear and ear canal normal.   Left Ear: Tympanic membrane, external ear and ear canal normal.   Nose: Mucosal edema and rhinorrhea present. Mouth/Throat: Mucous membranes are normal. Posterior oropharyngeal erythema present. No oropharyngeal exudate. Eyes: Conjunctivae and EOM are normal.   Neck: Normal range of motion. Neck supple. Cardiovascular: Normal heart sounds. A regularly irregular rhythm present. Bradycardia present. Pulmonary/Chest: Effort normal and breath sounds normal. No respiratory distress. Abdominal: Soft.  Bowel sounds are normal. There is no tenderness. Musculoskeletal: Normal range of motion. He exhibits no tenderness or deformity. Neurological: He is alert and oriented to person, place, and time. He exhibits normal muscle tone. Coordination normal.   Skin: Skin is warm and dry. Nursing note and vitals reviewed. Assessment/Plan:     Wander Randolph was seen today for chest congestion, cough, congestion, fatigue and nasal congestion. Diagnoses and all orders for this visit:    Acute bacterial sinusitis  -     amoxicillin-clavulanate (AUGMENTIN) 875-125 MG per tablet; Take 1 tablet by mouth 2 times daily for 10 days  -     CBC Auto Differential; Future    Diarrhea of presumed infectious origin  -     Comprehensive Metabolic Panel; Future    Bradycardia  -     EKG 12 Lead; Future    Pulse irregularity  -     EKG 12 Lead; Future    Uncontrolled hypertension    ASCVD (arteriosclerotic cardiovascular disease)  -     Lipid Panel; Future    Essential hypertension  -     hydrochlorothiazide (MICROZIDE) 12.5 MG capsule; TAKE TWO CAPSULES BY MOUTH IN THE MORNING    Patient has sinusitis so will treat with antibiotics. He may continue to use OTC sinus medication. Diarrhea may be from sinus drainage. He has bradycardia today and his pulse is irregular so will check an EKG for further evaluation. He has an appointment with Cardiology Wednesday. Blood pressure is elevated so will increase HCTZ from 12.5 mg to 25 mg daily. He will return in 1 week for a nurse BP check. Return if symptoms worsen or fail to improve.     Electronically signed by Phoenix Hackett MD on 10/22/2018 at 10:27 AM

## 2018-10-24 ENCOUNTER — HOSPITAL ENCOUNTER (EMERGENCY)
Age: 73
Discharge: HOME | End: 2018-10-24
Payer: MEDICARE

## 2018-10-24 ENCOUNTER — HOSPITAL ENCOUNTER (EMERGENCY)
Dept: GENERAL RADIOLOGY | Age: 73
Discharge: HOME | End: 2018-10-24
Payer: MEDICARE

## 2018-10-24 VITALS
WEIGHT: 214 LBS | SYSTOLIC BLOOD PRESSURE: 183 MMHG | DIASTOLIC BLOOD PRESSURE: 78 MMHG | HEIGHT: 68 IN | HEART RATE: 48 BPM | RESPIRATION RATE: 18 BRPM | OXYGEN SATURATION: 97 % | TEMPERATURE: 98.5 F | BODY MASS INDEX: 32.43 KG/M2

## 2018-10-24 DIAGNOSIS — M25.561 ACUTE PAIN OF RIGHT KNEE: Primary | ICD-10-CM

## 2018-10-24 PROCEDURE — 99213 OFFICE O/P EST LOW 20 MIN: CPT | Performed by: NURSE PRACTITIONER

## 2018-10-24 PROCEDURE — 2709999900 HC NON-CHARGEABLE SUPPLY

## 2018-10-24 PROCEDURE — 99213 OFFICE O/P EST LOW 20 MIN: CPT

## 2018-10-24 PROCEDURE — 73564 X-RAY EXAM KNEE 4 OR MORE: CPT

## 2018-10-24 ASSESSMENT — PAIN DESCRIPTION - DESCRIPTORS: DESCRIPTORS: ACHING;SHOOTING

## 2018-10-24 ASSESSMENT — PAIN DESCRIPTION - LOCATION: LOCATION: KNEE

## 2018-10-24 ASSESSMENT — PAIN DESCRIPTION - PROGRESSION: CLINICAL_PROGRESSION: GRADUALLY WORSENING

## 2018-10-24 ASSESSMENT — PAIN DESCRIPTION - ONSET: ONSET: SUDDEN

## 2018-10-24 ASSESSMENT — PAIN DESCRIPTION - ORIENTATION: ORIENTATION: RIGHT

## 2018-10-24 ASSESSMENT — PAIN SCALES - GENERAL: PAINLEVEL_OUTOF10: 10

## 2018-10-24 ASSESSMENT — PAIN DESCRIPTION - PAIN TYPE: TYPE: ACUTE PAIN

## 2018-10-24 ASSESSMENT — PAIN DESCRIPTION - FREQUENCY: FREQUENCY: CONTINUOUS

## 2018-10-24 ASSESSMENT — ENCOUNTER SYMPTOMS: BACK PAIN: 0

## 2018-10-24 NOTE — ED PROVIDER NOTES
to erythromycin; sulfa antibiotics; and vasotec [enalapril]. Patients   Immunization History   Administered Date(s) Administered    Hepatitis A 02/26/1998, 01/25/1999    Pneumococcal 13-valent Conjugate (Concha Montoya) 01/29/2018       FAMILY HISTORY     Patient's family history includes Alzheimer's Disease in his mother; Cancer in his father; Heart Disease in his brother; Hypertension in his brother; Stroke in his brother. SOCIAL HISTORY     Patient  reports that he has never smoked. He has never used smokeless tobacco. He reports that he drinks alcohol. He reports that he does not use drugs. PHYSICAL EXAM     ED TRIAGE VITALS  BP: (!) 183/78, Temp: 98.5 °F (36.9 °C), Pulse: (!) 48, Resp: 18, SpO2: 97 %,Estimated body mass index is 32.54 kg/m² as calculated from the following:    Height as of this encounter: 5' 8\" (1.727 m). Weight as of this encounter: 214 lb (97.1 kg). ,No LMP for male patient. Physical Exam   Constitutional: He is oriented to person, place, and time. He appears well-developed and well-nourished. No distress. Musculoskeletal: He exhibits edema (mild swellnig right knee) and tenderness (right knee). He exhibits no deformity. Right knee: He exhibits decreased range of motion, swelling and effusion (small). He exhibits no deformity, no laceration, no erythema and normal alignment. Tenderness found. Neurological: He is alert and oriented to person, place, and time. Skin: Skin is warm and dry. No rash noted. He is not diaphoretic. No erythema. No pallor. Psychiatric: He has a normal mood and affect. His behavior is normal. Judgment and thought content normal.       DIAGNOSTIC RESULTS     Labs:No results found for this visit on 10/24/18. IMAGING:    XR KNEE RIGHT (MIN 4 VIEWS)   Final Result   Small suprapatellar effusion.  Meniscal calcifications indicating possible chondrocalcinosis, no acute fracture,            **This report has been created using voice recognition

## 2018-11-01 ENCOUNTER — APPOINTMENT (OUTPATIENT)
Dept: GENERAL RADIOLOGY | Age: 73
DRG: 244 | End: 2018-11-01
Payer: MEDICARE

## 2018-11-01 ENCOUNTER — HOSPITAL ENCOUNTER (INPATIENT)
Age: 73
LOS: 5 days | Discharge: HOME | DRG: 244 | End: 2018-11-06
Attending: FAMILY MEDICINE | Admitting: INTERNAL MEDICINE
Payer: MEDICARE

## 2018-11-01 DIAGNOSIS — I44.0 FIRST DEGREE AV BLOCK: ICD-10-CM

## 2018-11-01 DIAGNOSIS — B96.89 ACUTE BACTERIAL SINUSITIS: ICD-10-CM

## 2018-11-01 DIAGNOSIS — R55 SYNCOPE AND COLLAPSE: Primary | ICD-10-CM

## 2018-11-01 DIAGNOSIS — J01.90 ACUTE BACTERIAL SINUSITIS: ICD-10-CM

## 2018-11-01 PROBLEM — Z79.01 WARFARIN ANTICOAGULATION: Status: ACTIVE | Noted: 2018-11-01

## 2018-11-01 PROBLEM — I95.1 ORTHOSTATIC HYPOTENSION: Status: ACTIVE | Noted: 2018-11-01

## 2018-11-01 PROBLEM — I45.9 HEART BLOCK: Status: ACTIVE | Noted: 2018-11-01

## 2018-11-01 PROBLEM — I49.9 CARDIAC DYSRHYTHMIA: Status: ACTIVE | Noted: 2018-11-01

## 2018-11-01 LAB
ALBUMIN SERPL-MCNC: 3.9 G/DL (ref 3.5–5.1)
ALP BLD-CCNC: 81 U/L (ref 38–126)
ALT SERPL-CCNC: 24 U/L (ref 11–66)
AMPHETAMINE+METHAMPHETAMINE URINE SCREEN: NEGATIVE
ANION GAP SERPL CALCULATED.3IONS-SCNC: 13 MEQ/L (ref 8–16)
APTT: 42.9 SECONDS (ref 22–38)
AST SERPL-CCNC: 22 U/L (ref 5–40)
BARBITURATE QUANTITATIVE URINE: NEGATIVE
BASOPHILS # BLD: 0.6 %
BASOPHILS ABSOLUTE: 0.1 THOU/MM3 (ref 0–0.1)
BENZODIAZEPINE QUANTITATIVE URINE: NEGATIVE
BILIRUB SERPL-MCNC: 0.8 MG/DL (ref 0.3–1.2)
BILIRUBIN DIRECT: < 0.2 MG/DL (ref 0–0.3)
BILIRUBIN URINE: NEGATIVE
BLOOD, URINE: NEGATIVE
BUN BLDV-MCNC: 24 MG/DL (ref 7–22)
CALCIUM SERPL-MCNC: 10.1 MG/DL (ref 8.5–10.5)
CALCIUM SERPL-MCNC: 9.8 MG/DL (ref 8.5–10.5)
CANNABINOID QUANTITATIVE URINE: NEGATIVE
CHARACTER, URINE: CLEAR
CHLORIDE BLD-SCNC: 99 MEQ/L (ref 98–111)
CO2: 26 MEQ/L (ref 23–33)
COCAINE METABOLITE QUANTITATIVE URINE: NEGATIVE
COLOR: YELLOW
CREAT SERPL-MCNC: 0.9 MG/DL (ref 0.4–1.2)
EKG ATRIAL RATE: 52 BPM
EKG ATRIAL RATE: 57 BPM
EKG P AXIS: 41 DEGREES
EKG P AXIS: 63 DEGREES
EKG P-R INTERVAL: 464 MS
EKG P-R INTERVAL: 520 MS
EKG Q-T INTERVAL: 496 MS
EKG Q-T INTERVAL: 512 MS
EKG QRS DURATION: 140 MS
EKG QRS DURATION: 144 MS
EKG QTC CALCULATION (BAZETT): 476 MS
EKG QTC CALCULATION (BAZETT): 482 MS
EKG R AXIS: 28 DEGREES
EKG R AXIS: 4 DEGREES
EKG T AXIS: 137 DEGREES
EKG T AXIS: 97 DEGREES
EKG VENTRICULAR RATE: 52 BPM
EKG VENTRICULAR RATE: 57 BPM
EOSINOPHIL # BLD: 1.5 %
EOSINOPHILS ABSOLUTE: 0.2 THOU/MM3 (ref 0–0.4)
ERYTHROCYTE [DISTWIDTH] IN BLOOD BY AUTOMATED COUNT: 13 % (ref 11.5–14.5)
ERYTHROCYTE [DISTWIDTH] IN BLOOD BY AUTOMATED COUNT: 42.5 FL (ref 35–45)
ETHYL ALCOHOL, SERUM: < 0.01 %
GFR SERPL CREATININE-BSD FRML MDRD: 83 ML/MIN/1.73M2
GLUCOSE BLD-MCNC: 183 MG/DL (ref 70–108)
GLUCOSE URINE: NEGATIVE MG/DL
HCT VFR BLD CALC: 45.5 % (ref 42–52)
HEMOGLOBIN: 15.8 GM/DL (ref 14–18)
IMMATURE GRANS (ABS): 0.02 THOU/MM3 (ref 0–0.07)
IMMATURE GRANULOCYTES: 0.2 %
INR BLD: 3.19 (ref 0.85–1.13)
KETONES, URINE: NEGATIVE
LACTIC ACID: 1.1 MMOL/L (ref 0.5–2.2)
LEUKOCYTE ESTERASE, URINE: NEGATIVE
LIPASE: 69.2 U/L (ref 5.6–51.3)
LYMPHOCYTES # BLD: 27.6 %
LYMPHOCYTES ABSOLUTE: 3.1 THOU/MM3 (ref 1–4.8)
MAGNESIUM: 1.9 MG/DL (ref 1.6–2.4)
MCH RBC QN AUTO: 30.9 PG (ref 26–33)
MCHC RBC AUTO-ENTMCNC: 34.7 GM/DL (ref 32.2–35.5)
MCV RBC AUTO: 88.9 FL (ref 80–94)
MONOCYTES # BLD: 10.8 %
MONOCYTES ABSOLUTE: 1.2 THOU/MM3 (ref 0.4–1.3)
MRSA SCREEN RT-PCR: NEGATIVE
NITRITE, URINE: NEGATIVE
NUCLEATED RED BLOOD CELLS: 0 /100 WBC
OPIATES, URINE: NEGATIVE
OSMOLALITY CALCULATION: 284.4 MOSMOL/KG (ref 275–300)
OXYCODONE: NEGATIVE
PH UA: 5
PHENCYCLIDINE QUANTITATIVE URINE: NEGATIVE
PLATELET # BLD: 260 THOU/MM3 (ref 130–400)
PMV BLD AUTO: 10.2 FL (ref 9.4–12.4)
POTASSIUM SERPL-SCNC: 4.3 MEQ/L (ref 3.5–5.2)
PRO-BNP: 654.9 PG/ML (ref 0–900)
PROTEIN UA: NEGATIVE
RBC # BLD: 5.12 MILL/MM3 (ref 4.7–6.1)
SEG NEUTROPHILS: 59.3 %
SEGMENTED NEUTROPHILS ABSOLUTE COUNT: 6.8 THOU/MM3 (ref 1.8–7.7)
SODIUM BLD-SCNC: 138 MEQ/L (ref 135–145)
SPECIFIC GRAVITY, URINE: 1.01 (ref 1–1.03)
TOTAL PROTEIN: 7.9 G/DL (ref 6.1–8)
TROPONIN T: < 0.01 NG/ML
TROPONIN T: < 0.01 NG/ML
UROBILINOGEN, URINE: 0.2 EU/DL
VANCOMYCIN RESISTANT ENTEROCOCCUS: NEGATIVE
WBC # BLD: 11.4 THOU/MM3 (ref 4.8–10.8)

## 2018-11-01 PROCEDURE — 2580000003 HC RX 258: Performed by: INTERNAL MEDICINE

## 2018-11-01 PROCEDURE — 82248 BILIRUBIN DIRECT: CPT

## 2018-11-01 PROCEDURE — 93010 ELECTROCARDIOGRAM REPORT: CPT | Performed by: INTERNAL MEDICINE

## 2018-11-01 PROCEDURE — 93005 ELECTROCARDIOGRAM TRACING: CPT | Performed by: FAMILY MEDICINE

## 2018-11-01 PROCEDURE — 84484 ASSAY OF TROPONIN QUANT: CPT

## 2018-11-01 PROCEDURE — 87500 VANOMYCIN DNA AMP PROBE: CPT

## 2018-11-01 PROCEDURE — 2000000000 HC ICU R&B

## 2018-11-01 PROCEDURE — 85730 THROMBOPLASTIN TIME PARTIAL: CPT

## 2018-11-01 PROCEDURE — 80053 COMPREHEN METABOLIC PANEL: CPT

## 2018-11-01 PROCEDURE — 2709999900 HC NON-CHARGEABLE SUPPLY

## 2018-11-01 PROCEDURE — 6360000002 HC RX W HCPCS

## 2018-11-01 PROCEDURE — 82310 ASSAY OF CALCIUM: CPT

## 2018-11-01 PROCEDURE — 87641 MR-STAPH DNA AMP PROBE: CPT

## 2018-11-01 PROCEDURE — 2500000003 HC RX 250 WO HCPCS

## 2018-11-01 PROCEDURE — C1756 CATH, PACING, TRANSESOPH: HCPCS

## 2018-11-01 PROCEDURE — 85610 PROTHROMBIN TIME: CPT

## 2018-11-01 PROCEDURE — 83880 ASSAY OF NATRIURETIC PEPTIDE: CPT

## 2018-11-01 PROCEDURE — 6360000002 HC RX W HCPCS: Performed by: INTERNAL MEDICINE

## 2018-11-01 PROCEDURE — 6370000000 HC RX 637 (ALT 250 FOR IP): Performed by: INTERNAL MEDICINE

## 2018-11-01 PROCEDURE — 99285 EMERGENCY DEPT VISIT HI MDM: CPT

## 2018-11-01 PROCEDURE — 71046 X-RAY EXAM CHEST 2 VIEWS: CPT

## 2018-11-01 PROCEDURE — 85025 COMPLETE CBC W/AUTO DIFF WBC: CPT

## 2018-11-01 PROCEDURE — 83735 ASSAY OF MAGNESIUM: CPT

## 2018-11-01 PROCEDURE — 36415 COLL VENOUS BLD VENIPUNCTURE: CPT

## 2018-11-01 PROCEDURE — 2580000003 HC RX 258: Performed by: FAMILY MEDICINE

## 2018-11-01 PROCEDURE — G0480 DRUG TEST DEF 1-7 CLASSES: HCPCS

## 2018-11-01 PROCEDURE — 99291 CRITICAL CARE FIRST HOUR: CPT | Performed by: INTERNAL MEDICINE

## 2018-11-01 PROCEDURE — 33210 INSERT ELECTRD/PM CATH SNGL: CPT | Performed by: INTERNAL MEDICINE

## 2018-11-01 PROCEDURE — 87081 CULTURE SCREEN ONLY: CPT

## 2018-11-01 PROCEDURE — 99223 1ST HOSP IP/OBS HIGH 75: CPT | Performed by: INTERNAL MEDICINE

## 2018-11-01 PROCEDURE — 33210 INSERT ELECTRD/PM CATH SNGL: CPT

## 2018-11-01 PROCEDURE — C1894 INTRO/SHEATH, NON-LASER: HCPCS

## 2018-11-01 PROCEDURE — 81003 URINALYSIS AUTO W/O SCOPE: CPT

## 2018-11-01 PROCEDURE — 83690 ASSAY OF LIPASE: CPT

## 2018-11-01 PROCEDURE — 83605 ASSAY OF LACTIC ACID: CPT

## 2018-11-01 PROCEDURE — 80307 DRUG TEST PRSMV CHEM ANLYZR: CPT

## 2018-11-01 RX ORDER — SODIUM CHLORIDE 0.9 % (FLUSH) 0.9 %
10 SYRINGE (ML) INJECTION PRN
Status: DISCONTINUED | OUTPATIENT
Start: 2018-11-01 | End: 2018-11-01 | Stop reason: SDUPTHER

## 2018-11-01 RX ORDER — SODIUM CHLORIDE 0.9 % (FLUSH) 0.9 %
10 SYRINGE (ML) INJECTION EVERY 12 HOURS SCHEDULED
Status: DISCONTINUED | OUTPATIENT
Start: 2018-11-01 | End: 2018-11-06 | Stop reason: HOSPADM

## 2018-11-01 RX ORDER — ATROPINE SULFATE 0.4 MG/ML
0.5 AMPUL (ML) INJECTION
Status: ACTIVE | OUTPATIENT
Start: 2018-11-01 | End: 2018-11-01

## 2018-11-01 RX ORDER — WARFARIN SODIUM 3 MG/1
6 TABLET ORAL DAILY
Status: DISCONTINUED | OUTPATIENT
Start: 2018-11-01 | End: 2018-11-01 | Stop reason: DRUGHIGH

## 2018-11-01 RX ORDER — 0.9 % SODIUM CHLORIDE 0.9 %
1000 INTRAVENOUS SOLUTION INTRAVENOUS ONCE
Status: COMPLETED | OUTPATIENT
Start: 2018-11-01 | End: 2018-11-01

## 2018-11-01 RX ORDER — POTASSIUM CHLORIDE 7.45 MG/ML
10 INJECTION INTRAVENOUS PRN
Status: DISCONTINUED | OUTPATIENT
Start: 2018-11-01 | End: 2018-11-06 | Stop reason: HOSPADM

## 2018-11-01 RX ORDER — SODIUM CHLORIDE 9 MG/ML
INJECTION, SOLUTION INTRAVENOUS CONTINUOUS
Status: DISCONTINUED | OUTPATIENT
Start: 2018-11-01 | End: 2018-11-01

## 2018-11-01 RX ORDER — SODIUM CHLORIDE 0.9 % (FLUSH) 0.9 %
10 SYRINGE (ML) INJECTION EVERY 12 HOURS SCHEDULED
Status: DISCONTINUED | OUTPATIENT
Start: 2018-11-01 | End: 2018-11-01 | Stop reason: SDUPTHER

## 2018-11-01 RX ORDER — POTASSIUM CHLORIDE 20MEQ/15ML
40 LIQUID (ML) ORAL PRN
Status: DISCONTINUED | OUTPATIENT
Start: 2018-11-01 | End: 2018-11-06 | Stop reason: HOSPADM

## 2018-11-01 RX ORDER — METOPROLOL TARTRATE 50 MG/1
50 TABLET, FILM COATED ORAL 2 TIMES DAILY
Status: DISCONTINUED | OUTPATIENT
Start: 2018-11-01 | End: 2018-11-02

## 2018-11-01 RX ORDER — POTASSIUM CHLORIDE 20 MEQ/1
40 TABLET, EXTENDED RELEASE ORAL PRN
Status: DISCONTINUED | OUTPATIENT
Start: 2018-11-01 | End: 2018-11-06 | Stop reason: HOSPADM

## 2018-11-01 RX ORDER — HYDROCHLOROTHIAZIDE 12.5 MG/1
25 CAPSULE, GELATIN COATED ORAL DAILY
Status: DISCONTINUED | OUTPATIENT
Start: 2018-11-02 | End: 2018-11-01 | Stop reason: SDUPTHER

## 2018-11-01 RX ORDER — SODIUM CHLORIDE 9 MG/ML
INJECTION, SOLUTION INTRAVENOUS CONTINUOUS
Status: DISCONTINUED | OUTPATIENT
Start: 2018-11-01 | End: 2018-11-05

## 2018-11-01 RX ORDER — ONDANSETRON 2 MG/ML
4 INJECTION INTRAMUSCULAR; INTRAVENOUS EVERY 6 HOURS PRN
Status: DISCONTINUED | OUTPATIENT
Start: 2018-11-01 | End: 2018-11-05

## 2018-11-01 RX ORDER — ASPIRIN 81 MG/1
81 TABLET ORAL DAILY
Status: DISCONTINUED | OUTPATIENT
Start: 2018-11-02 | End: 2018-11-06 | Stop reason: HOSPADM

## 2018-11-01 RX ORDER — DOPAMINE HYDROCHLORIDE 160 MG/100ML
5 INJECTION, SOLUTION INTRAVENOUS CONTINUOUS
Status: DISCONTINUED | OUTPATIENT
Start: 2018-11-01 | End: 2018-11-06 | Stop reason: HOSPADM

## 2018-11-01 RX ORDER — HYDROCHLOROTHIAZIDE 25 MG/1
25 TABLET ORAL DAILY
Status: DISCONTINUED | OUTPATIENT
Start: 2018-11-02 | End: 2018-11-02

## 2018-11-01 RX ORDER — ATORVASTATIN CALCIUM 80 MG/1
80 TABLET, FILM COATED ORAL DAILY
Status: DISCONTINUED | OUTPATIENT
Start: 2018-11-02 | End: 2018-11-06 | Stop reason: HOSPADM

## 2018-11-01 RX ORDER — LISINOPRIL 10 MG/1
10 TABLET ORAL 2 TIMES DAILY
Status: DISCONTINUED | OUTPATIENT
Start: 2018-11-01 | End: 2018-11-02

## 2018-11-01 RX ORDER — ALLOPURINOL 300 MG/1
300 TABLET ORAL DAILY
Status: DISCONTINUED | OUTPATIENT
Start: 2018-11-02 | End: 2018-11-06 | Stop reason: HOSPADM

## 2018-11-01 RX ORDER — SODIUM CHLORIDE 0.9 % (FLUSH) 0.9 %
10 SYRINGE (ML) INJECTION PRN
Status: DISCONTINUED | OUTPATIENT
Start: 2018-11-01 | End: 2018-11-06 | Stop reason: HOSPADM

## 2018-11-01 RX ORDER — ACETAMINOPHEN 325 MG/1
650 TABLET ORAL EVERY 4 HOURS PRN
Status: DISCONTINUED | OUTPATIENT
Start: 2018-11-01 | End: 2018-11-05

## 2018-11-01 RX ADMIN — Medication 10 ML: at 21:36

## 2018-11-01 RX ADMIN — ACETAMINOPHEN 650 MG: 325 TABLET ORAL at 21:22

## 2018-11-01 RX ADMIN — SODIUM CHLORIDE 1000 ML: 9 INJECTION, SOLUTION INTRAVENOUS at 13:53

## 2018-11-01 RX ADMIN — SODIUM CHLORIDE: 9 INJECTION, SOLUTION INTRAVENOUS at 20:30

## 2018-11-01 RX ADMIN — ONDANSETRON HYDROCHLORIDE 4 MG: 2 SOLUTION INTRAMUSCULAR; INTRAVENOUS at 20:29

## 2018-11-01 RX ADMIN — LISINOPRIL 10 MG: 10 TABLET ORAL at 21:22

## 2018-11-01 ASSESSMENT — PAIN DESCRIPTION - PROGRESSION: CLINICAL_PROGRESSION: NOT CHANGED

## 2018-11-01 ASSESSMENT — PAIN DESCRIPTION - LOCATION
LOCATION: NECK
LOCATION: NECK

## 2018-11-01 ASSESSMENT — PAIN DESCRIPTION - FREQUENCY
FREQUENCY: CONTINUOUS
FREQUENCY: CONTINUOUS

## 2018-11-01 ASSESSMENT — PAIN DESCRIPTION - DESCRIPTORS
DESCRIPTORS: ACHING
DESCRIPTORS: ACHING

## 2018-11-01 ASSESSMENT — ENCOUNTER SYMPTOMS
WHEEZING: 0
TROUBLE SWALLOWING: 0
SORE THROAT: 0
ABDOMINAL PAIN: 0
PHOTOPHOBIA: 0
SHORTNESS OF BREATH: 0
VOMITING: 0
ABDOMINAL DISTENTION: 0
NAUSEA: 0
CONSTIPATION: 0
DIARRHEA: 0
CHEST TIGHTNESS: 0
COUGH: 0
BACK PAIN: 0
RHINORRHEA: 0

## 2018-11-01 ASSESSMENT — PAIN DESCRIPTION - PAIN TYPE
TYPE: ACUTE PAIN;SURGICAL PAIN
TYPE: ACUTE PAIN;SURGICAL PAIN

## 2018-11-01 ASSESSMENT — PAIN DESCRIPTION - ONSET
ONSET: ON-GOING
ONSET: ON-GOING

## 2018-11-01 ASSESSMENT — PAIN SCALES - GENERAL
PAINLEVEL_OUTOF10: 8
PAINLEVEL_OUTOF10: 5
PAINLEVEL_OUTOF10: 4

## 2018-11-01 ASSESSMENT — PAIN DESCRIPTION - ORIENTATION
ORIENTATION: RIGHT
ORIENTATION: RIGHT

## 2018-11-01 NOTE — BRIEF OP NOTE
[address] [place] [address]  Sedation/Analgesia Post Sedation Record    Pt Name: Jose Vazquez  Account number: [de-identified]  MRN: [id number]  YOB: 1945  Procedure Performed By: Fuad Andrea, [address]  Primary Care Physician: Duarte Pena MD  Date: 11/1/2018    POST-PROCEDURE    Physicians/Assistants: Fuad Andrea, DESTIN    Procedure Performed:Transvenous Pacemaker      Sedation/Anesthesia: Versed/ Fentanyl and 2% xylocaine local anesthesia. Estimated Blood Loss: < 50 ml. Specimens Removed: None         Disposition of Specimen: N/A        Complications: No Immediate Complications.        Post-procedure Diagnosis/Findings:     R IJ PM  Thresholds less than 1 mA  Settings: 80 ppm, 5 mA output  ICU   Bedrest  BB washout  EP consult               Fuad Andrea, [address]  Electronically signed 11/1/2018 at 7:33 PM  Interventional Cardiology

## 2018-11-01 NOTE — ED NOTES
Pt resting with easy respirations. Skin warm and dry. HR around 60. Color is normal for ethnicity.        Kaz Torres RN  11/01/18 [id number]

## 2018-11-01 NOTE — PRE SEDATION
Pulses 2+ bilaterally  Mental Status: Alert & Oriented        PLANNED PROCEDURE   []Cath  []PCI                []Pacemaker/AICD  []JANICE             []Cardioversion []Peripheral angiography/PTA  [x]Other: TVP     SEDATION  Planned agent:[x]Midazolam []Meperidine [x]Sublimaze []Morphine  []Diazepam  []Other:     ASA Classification:  []1 []2 [x]3 []4 []5  Class 1: A normal healthy patient  Class 2: Pt with mild to moderate systemic disease  Class 3: Severe systemic disease or disturbance  Class 4: Severe systemic disorders that are already life threatening. Class 5: Moribund pt with little chances of survival, for more than 24 hours. Mallampati I Airway Classification:   []1 []2 [x]3 []4    [x]Pre-procedure diagnostic studies complete and results available. Comment:    [x]Previous sedation/anesthesia experiences assessed. Comment:    [x]The patient is an appropriate candidate to undergo the planned procedure sedation and anesthesia. (Refer to nursing sedation/analgesia documentation record)  [x]Formulation and discussion of sedation/procedure plan, risks, and expectations with patient and/or responsible adult completed. [x]Patient examined immediately prior to the procedure.  (Refer to nursing sedation/analgesia documentation record)    Kyle Singletary MD   Electronically signed 11/1/2018 at 7:32 PM

## 2018-11-01 NOTE — ED PROVIDER NOTES
in the absence of a cardiologist.  Sinus bradycardia, ventricular rate 57. . . QTC of 42. No ST changes. When compared to previous EKG from October 22, 2018, persistent bradycardia with prolonged MD interval.      With the long MD interval, there is a concern for 3rd degree block. RADIOLOGY: non-plain film images(s) such as CT, Ultrasound and MRI are read by the radiologist.    XR CHEST STANDARD (2 VW)   Final Result   1. Normal heart size. Metallic sternotomy sutures. Prosthetic heart valve. 2. Minimal atelectatic/fibrotic stranding left lung base. 3. Otherwise normal chest. No acute infiltrates or effusions are seen. **This report has been created using voice recognition software. It may contain minor errors which are inherent in voice recognition technology. **      Final report electronically signed by Dr. Gavino Randolph on 11/1/2018 1:56 PM            LABS:   Labs Reviewed   CBC WITH AUTO DIFFERENTIAL - Abnormal; Notable for the following:        Result Value    WBC 11.4 (*)     All other components within normal limits   BASIC METABOLIC PANEL - Abnormal; Notable for the following:     Glucose 183 (*)     BUN 24 (*)     All other components within normal limits   APTT - Abnormal; Notable for the following:     aPTT 42.9 (*)     All other components within normal limits   PROTIME-INR - Abnormal; Notable for the following:     INR 3.19 (*)     All other components within normal limits   LIPASE - Abnormal; Notable for the following:     Lipase 69.2 (*)     All other components within normal limits   GLOMERULAR FILTRATION RATE, ESTIMATED - Abnormal; Notable for the following:     Est, Glom Filt Rate 83 (*)     All other components within normal limits   MAGNESIUM   TROPONIN   BRAIN NATRIURETIC PEPTIDE   LACTIC ACID, PLASMA   HEPATIC FUNCTION PANEL   CALCIUM   URINE DRUG SCREEN   ETHANOL   URINE RT REFLEX TO CULTURE   ANION GAP   OSMOLALITY       EMERGENCY DEPARTMENT COURSE:

## 2018-11-02 LAB
ANION GAP SERPL CALCULATED.3IONS-SCNC: 13 MEQ/L (ref 8–16)
BASOPHILS # BLD: 0.4 %
BASOPHILS ABSOLUTE: 0 THOU/MM3 (ref 0–0.1)
BUN BLDV-MCNC: 18 MG/DL (ref 7–22)
CALCIUM SERPL-MCNC: 9 MG/DL (ref 8.5–10.5)
CHLORIDE BLD-SCNC: 105 MEQ/L (ref 98–111)
CO2: 23 MEQ/L (ref 23–33)
CREAT SERPL-MCNC: 0.7 MG/DL (ref 0.4–1.2)
EOSINOPHIL # BLD: 1.1 %
EOSINOPHILS ABSOLUTE: 0.1 THOU/MM3 (ref 0–0.4)
ERYTHROCYTE [DISTWIDTH] IN BLOOD BY AUTOMATED COUNT: 13.2 % (ref 11.5–14.5)
ERYTHROCYTE [DISTWIDTH] IN BLOOD BY AUTOMATED COUNT: 42.3 FL (ref 35–45)
GFR SERPL CREATININE-BSD FRML MDRD: > 90 ML/MIN/1.73M2
GLUCOSE BLD-MCNC: 122 MG/DL (ref 70–108)
GLUCOSE BLD-MCNC: 133 MG/DL (ref 70–108)
GLUCOSE BLD-MCNC: 137 MG/DL (ref 70–108)
HCT VFR BLD CALC: 39.9 % (ref 42–52)
HEMOGLOBIN: 13.9 GM/DL (ref 14–18)
IMMATURE GRANS (ABS): 0.03 THOU/MM3 (ref 0–0.07)
IMMATURE GRANULOCYTES: 0.3 %
INR BLD: 3.79 (ref 0.85–1.13)
LYMPHOCYTES # BLD: 20 %
LYMPHOCYTES ABSOLUTE: 2 THOU/MM3 (ref 1–4.8)
MCH RBC QN AUTO: 31 PG (ref 26–33)
MCHC RBC AUTO-ENTMCNC: 34.8 GM/DL (ref 32.2–35.5)
MCV RBC AUTO: 88.9 FL (ref 80–94)
MONOCYTES # BLD: 9.3 %
MONOCYTES ABSOLUTE: 0.9 THOU/MM3 (ref 0.4–1.3)
NUCLEATED RED BLOOD CELLS: 0 /100 WBC
PLATELET # BLD: 204 THOU/MM3 (ref 130–400)
PMV BLD AUTO: 10.6 FL (ref 9.4–12.4)
POTASSIUM REFLEX MAGNESIUM: 4.2 MEQ/L (ref 3.5–5.2)
RBC # BLD: 4.49 MILL/MM3 (ref 4.7–6.1)
SEG NEUTROPHILS: 68.9 %
SEGMENTED NEUTROPHILS ABSOLUTE COUNT: 7 THOU/MM3 (ref 1.8–7.7)
SODIUM BLD-SCNC: 141 MEQ/L (ref 135–145)
TROPONIN T: < 0.01 NG/ML
WBC # BLD: 10.1 THOU/MM3 (ref 4.8–10.8)

## 2018-11-02 PROCEDURE — 6370000000 HC RX 637 (ALT 250 FOR IP): Performed by: NURSE PRACTITIONER

## 2018-11-02 PROCEDURE — 2580000003 HC RX 258: Performed by: INTERNAL MEDICINE

## 2018-11-02 PROCEDURE — 93005 ELECTROCARDIOGRAM TRACING: CPT | Performed by: INTERNAL MEDICINE

## 2018-11-02 PROCEDURE — 36415 COLL VENOUS BLD VENIPUNCTURE: CPT

## 2018-11-02 PROCEDURE — 99223 1ST HOSP IP/OBS HIGH 75: CPT | Performed by: INTERNAL MEDICINE

## 2018-11-02 PROCEDURE — 85025 COMPLETE CBC W/AUTO DIFF WBC: CPT

## 2018-11-02 PROCEDURE — 99232 SBSQ HOSP IP/OBS MODERATE 35: CPT | Performed by: INTERNAL MEDICINE

## 2018-11-02 PROCEDURE — 2709999900 HC NON-CHARGEABLE SUPPLY

## 2018-11-02 PROCEDURE — 82948 REAGENT STRIP/BLOOD GLUCOSE: CPT

## 2018-11-02 PROCEDURE — 80048 BASIC METABOLIC PNL TOTAL CA: CPT

## 2018-11-02 PROCEDURE — 99232 SBSQ HOSP IP/OBS MODERATE 35: CPT | Performed by: NURSE PRACTITIONER

## 2018-11-02 PROCEDURE — 85610 PROTHROMBIN TIME: CPT

## 2018-11-02 PROCEDURE — 2000000000 HC ICU R&B

## 2018-11-02 PROCEDURE — 84484 ASSAY OF TROPONIN QUANT: CPT

## 2018-11-02 PROCEDURE — 6370000000 HC RX 637 (ALT 250 FOR IP): Performed by: INTERNAL MEDICINE

## 2018-11-02 PROCEDURE — 93005 ELECTROCARDIOGRAM TRACING: CPT | Performed by: NURSE PRACTITIONER

## 2018-11-02 RX ORDER — DEXTROSE MONOHYDRATE 50 MG/ML
100 INJECTION, SOLUTION INTRAVENOUS PRN
Status: DISCONTINUED | OUTPATIENT
Start: 2018-11-02 | End: 2018-11-06 | Stop reason: HOSPADM

## 2018-11-02 RX ORDER — LISINOPRIL 2.5 MG/1
2.5 TABLET ORAL 2 TIMES DAILY
Status: DISCONTINUED | OUTPATIENT
Start: 2018-11-02 | End: 2018-11-02

## 2018-11-02 RX ORDER — DEXTROSE MONOHYDRATE 25 G/50ML
12.5 INJECTION, SOLUTION INTRAVENOUS PRN
Status: DISCONTINUED | OUTPATIENT
Start: 2018-11-02 | End: 2018-11-06 | Stop reason: HOSPADM

## 2018-11-02 RX ORDER — LISINOPRIL 10 MG/1
10 TABLET ORAL 2 TIMES DAILY
Status: DISCONTINUED | OUTPATIENT
Start: 2018-11-02 | End: 2018-11-06 | Stop reason: HOSPADM

## 2018-11-02 RX ORDER — NICOTINE POLACRILEX 4 MG
15 LOZENGE BUCCAL PRN
Status: DISCONTINUED | OUTPATIENT
Start: 2018-11-02 | End: 2018-11-06 | Stop reason: HOSPADM

## 2018-11-02 RX ADMIN — ACETAMINOPHEN 650 MG: 325 TABLET ORAL at 03:56

## 2018-11-02 RX ADMIN — LISINOPRIL 2.5 MG: 10 TABLET ORAL at 09:27

## 2018-11-02 RX ADMIN — ASPIRIN 81 MG: 81 TABLET, COATED ORAL at 09:26

## 2018-11-02 RX ADMIN — SODIUM CHLORIDE: 9 INJECTION, SOLUTION INTRAVENOUS at 11:03

## 2018-11-02 RX ADMIN — SODIUM CHLORIDE: 9 INJECTION, SOLUTION INTRAVENOUS at 15:05

## 2018-11-02 RX ADMIN — ATORVASTATIN CALCIUM 80 MG: 80 TABLET, FILM COATED ORAL at 19:57

## 2018-11-02 RX ADMIN — LISINOPRIL 10 MG: 10 TABLET ORAL at 16:58

## 2018-11-02 RX ADMIN — ALLOPURINOL 300 MG: 300 TABLET ORAL at 09:26

## 2018-11-02 RX ADMIN — ACETAMINOPHEN 650 MG: 325 TABLET ORAL at 19:57

## 2018-11-02 ASSESSMENT — PAIN SCALES - GENERAL
PAINLEVEL_OUTOF10: 3
PAINLEVEL_OUTOF10: 7
PAINLEVEL_OUTOF10: 3

## 2018-11-02 ASSESSMENT — PAIN DESCRIPTION - FREQUENCY
FREQUENCY: CONTINUOUS
FREQUENCY: CONTINUOUS

## 2018-11-02 ASSESSMENT — ENCOUNTER SYMPTOMS
CONSTIPATION: 0
DOUBLE VISION: 0
SHORTNESS OF BREATH: 0
SORE THROAT: 0
LEFT EYE: 0
BLURRED VISION: 0
BACK PAIN: 0
RIGHT EYE: 0
VOMITING: 0
ABDOMINAL PAIN: 0
COUGH: 0
NAUSEA: 0
WHEEZING: 0
DIARRHEA: 0

## 2018-11-02 ASSESSMENT — PAIN DESCRIPTION - LOCATION
LOCATION: NECK
LOCATION: HEAD

## 2018-11-02 ASSESSMENT — PAIN DESCRIPTION - DESCRIPTORS
DESCRIPTORS: ACHING
DESCRIPTORS: ACHING

## 2018-11-02 ASSESSMENT — PAIN DESCRIPTION - ORIENTATION: ORIENTATION: RIGHT

## 2018-11-02 ASSESSMENT — PAIN DESCRIPTION - PAIN TYPE
TYPE: ACUTE PAIN
TYPE: ACUTE PAIN;SURGICAL PAIN

## 2018-11-02 NOTE — PROGRESS NOTES
bowel sounds, no masses Extremities: no cyanosis, clubbing or edema, pulses present  Skin: warm and dry, no rash or erythema  Head: normocephalic and atraumatic  Musculoskeletal: normal range of motion, no joint swelling, deformity or tenderness  Neurological: alert, oriented, normal speech, no focal findings or movement disorder noted    Medications:    allopurinol  300 mg Oral Daily    aspirin  81 mg Oral Daily    atorvastatin  80 mg Oral Daily    lisinopril  10 mg Oral BID    metoprolol tartrate  50 mg Oral BID    sodium chloride flush  10 mL Intravenous 2 times per day    hydrochlorothiazide  25 mg Oral Daily      sodium chloride 50 mL/hr at 18 2030    DOPamine         ondansetron 4 mg Q6H PRN   magnesium sulfate 1 g PRN   potassium chloride 40 mEq PRN   Or     potassium chloride 40 mEq PRN   Or     potassium chloride 10 mEq PRN   sodium chloride flush 10 mL PRN   acetaminophen 650 mg Q4H PRN   magnesium hydroxide 30 mL Daily PRN       Diagnostics:  EK2018 17:23:29 [facility] ROUTINE RETRIEVAL  Sinus rhythm with 2nd degree A-V block (Mobitz I)  Left bundle branch block  Abnormal ECG  When compared with ECG of 2018 15:01,  Aberrant conduction is no longer Present  Sinus rhythm is now with 2nd degree A-V block (Mobitz I)    Echo:   Electronically signed by Arpita Solis MD (Interpreting   physician) on 2017 at 09:01 AM   ----------------------------------------------------------------      Findings      Mitral Valve   The mitral valve was not well visualized .   Calcification of the mitral valve noted.   Decreased mitral valve mobility noted.   Mild mitral regurgitation is present.      Aortic Valve   Normally functioning bileaflet mechanical prosthetic valve in aortic   position.   The aortic valve leaflets were not well visualized.      Tricuspid Valve   Tricuspid valve was not well visualized.   Mild tricuspid regurgitation visualized.      Pulmonic

## 2018-11-02 NOTE — PROGRESS NOTES
Urinalysis:    Lab Results   Component Value Date    NITRU NEGATIVE 11/01/2018    BLOODU NEGATIVE 11/01/2018    GLUCOSEU NEGATIVE 11/01/2018       Radiology:  XR CHEST STANDARD (2 VW)   Final Result   1. Normal heart size. Metallic sternotomy sutures. Prosthetic heart valve. 2. Minimal atelectatic/fibrotic stranding left lung base. 3. Otherwise normal chest. No acute infiltrates or effusions are seen. **This report has been created using voice recognition software. It may contain minor errors which are inherent in voice recognition technology. **      Final report electronically signed by Dr. Blayne Lopez on 11/1/2018 1:56 PM          DVT prophylaxis: [] Lovenox                                 [] SCDs                                 [x] IV Heparin when INR <2                                 [] Encourage ambulation           [x] Already on Anticoagulation - Coumadin. Code Status: Full Code    PT/OT Eval Status: when appropriate. Tele:   [x] yes             [] no        Electronically signed by DEAN Dukes CNP on 11/2/2018 at 12:22 PM     Continuing with temporary pacer until INR low enough to proceed with permanent pacemaker placement. This is tentatively scheduled for Monday. Continue with supportive care. Electronically signed by Clari Bond MD.

## 2018-11-02 NOTE — H&P
block    Xr Chest Standard (2 Vw)    Result Date: 11/1/2018  PROCEDURE: XR CHEST (2 VW) CLINICAL INFORMATION: dizziness, COMPARISON: 7/6/2009 TECHNIQUE: PA and lateral views of the chest were obtained. 1. Normal heart size. Metallic sternotomy sutures. Prosthetic heart valve. 2. Minimal atelectatic/fibrotic stranding left lung base. 3. Otherwise normal chest. No acute infiltrates or effusions are seen. **This report has been created using voice recognition software. It may contain minor errors which are inherent in voice recognition technology. ** Final report electronically signed by Dr. Gavino Randolph on 11/1/2018 1:56 PM    Xr Knee Right (min 4 Views)    Result Date: 10/24/2018  PROCEDURE: XR KNEE RIGHT (MIN 4 VIEWS) CLINICAL INFORMATION: pain,  . COMPARISON: No prior study. TECHNIQUE: 4 projections FINDINGS: No acute fracture or dislocation. Calcifications along the patellar tendon. Meniscal calcifications are present. Ossification at the lateral joint space possibly a fabella. Mild degenerative changes involve the articular surfaces. Very small suprapatellar effusion. Postsurgical changes surgical clips in the posterior medial soft tissues likely vein harvesting     Small suprapatellar effusion. Meniscal calcifications indicating possible chondrocalcinosis, no acute fracture, **This report has been created using voice recognition software. It may contain minor errors which are inherent in voice recognition technology. ** Final report electronically signed by Dr. Marti hSeth on 10/24/2018 9:50 AM           DVT prophylaxis: [x] Lovenox                                 [] SCDs                                 [] SQ Heparin                                 [] Encourage ambulation           [] Already on Anticoagulation    Code Status: Full Code      PT/OT Eval Status:     Disposition:    [x] Home       [] TCU       [] Rehab       [] Psych       [] SNF       [] [facility]       []

## 2018-11-02 NOTE — CONSULTS
[facility]'s  Consult    Patient's Name/Date of Birth: Yari Winkler / 1945 (89 y.o.)    Date: November 1, 2018     Referring Provider: ED    CHIEF COMPLAINT: Syncope      HPI: This is a pleasant 68 y.o. male presents with hx of mechanical AVR 10 years ago, CAD, who presents for LOC and dizziness. Passed out at home. Denies chest pain. Patient was in the ED, found to be bradycardiac with SR, 1st degree AVB with MO  400 ms, and LBBB; then a follow-up ECG showing Wenckebach. Monitor at times concerning for CHB. Telemetry strip showing SR with CHB and no V-escape resulting in presyncopal event that lasted 10-30 seconds. No CPR. Spontaneous recovery. Patient takes BB at home. No other AAD. Patient on Coumadin per Dr. Arya Leslie. EF 55%, mild LVH, mild MR. INR 3.2. No current chest pain, angina, GAN, orthopnea, PND, sob at rest, palpitations, LE edema, or syncope. Echo:   Results for orders placed during the hospital encounter of 02/07/17   Echocardiogram 2D/ M-Mode/ Colorflow/ Do    Narrative Transthoracic Echocardiography Report (TTE)     Demographics      Patient Name   Isai Clark  Gender              Male                  L      MR #           [id number]      Race                                                    Ethnicity      Account #      [de-identified]        Room Number      Accession      [id number]      Date of Study       02/07/2017   Number      Date of Birth  1945     Referring Physician Matt Black MD      Age            80 year(s)     Adilia Eric, MATT,                                                     RDMS, RVT                                    Interpreting        Matt Becerril MD                                 Physician     Procedure    Type of Study      TTE procedure:ECHOCARDIOGRAM COMPLETE 2D W DOPPLER W COLOR.      Procedure Atrium   Right atrial size was normal.      Right Ventricle   The right ventricular size was normal with normal systolic function and   wall thickness. Pericardial Effusion   The pericardium was normal in appearance with no evidence of a pericardial   effusion. Pleural Effusion   No evidence of pleural effusion. Aorta / Great Vessels   -Aortic root dimension within normal limits.   -The Pulmonary artery is within normal limits. -IVC size is within normal limits with normal respiratory phasic changes.      M-Mode/2D Measurements & Calculations      LV Diastolic     LV Systolic Dimension: 3.2  AV Cusp Separation: 1 cmLA   Dimension: 4.7   cm                          Dimension: 4.1 cmAO Root   cm               LV Volume Diastolic: 278 ml Dimension: 2 cm   LV FS:31.9 %     LV Volume Systolic: 41 ml   LV PW Diastolic: LV EDV/LV EDV Index: 102   1 cm             ml/47 m^2LV ESV/LV ESV   Septum           Index: 41 ml/19 m^2         RV Diastolic Dimension: 2.3   Diastolic: 1 cm  EF Calculated: 59.8 %       cm                                                   LA/Aorta: 2.05                       LVOT: 2 cm     Doppler Measurements & Calculations      MV Peak E-Wave: 121  AV Peak Velocity: 195   LVOT Peak Velocity: 105 cm/s   cm/s                 cm/s                    LVOT Mean Velocity: 81.9 cm/s   MV Peak A-Wave: 120  AV Peak Gradient: 15.21 LVOT Peak Gradient: 4   cm/s                 mmHg                    mmHgLVOT Mean Gradient: 3   MV E/A Ratio: 1.01   AV Mean Velocity: 140   mmHg   MV Peak Gradient:    cm/s   5.86 mmHg            AV Mean Gradient: 9     TV Peak E-Wave: 57.4 cm/s   MV Mean Gradient: 3  mmHg                    TV Peak A-Wave: 51.8 cm/s   mmHg                 AV VTI: 39.2 cm   MV Mean Velocity:    AV Area                 TV Peak Gradient: 1.32 mmHg   82.4 cm/s            (Continuity):2.12 cm^2  TR Velocity:246 cm/s   MV Deceleration                              TR Gradient:24.21 mmHg tablet Take 1 tablet by mouth daily 7/31/18  Yes Phoenix Hackett MD   metoprolol tartrate (LOPRESSOR) 50 MG tablet Take 1 tablet by mouth 2 times daily 7/31/18  Yes Phoenix Hackett MD   atorvastatin (LIPITOR) 80 MG tablet Take 1 tablet by mouth daily 7/31/18  Yes Phoenix Hackett MD   lisinopril (PRINIVIL;ZESTRIL) 10 MG tablet Take 1 tablet by mouth 2 times daily 7/31/18  Yes Phoenix Hackett MD   triamcinolone (KENALOG) 0.1 % cream Apply topically as needed Apply topically 2 times daily. Yes Historical Provider, MD   aspirin 81 MG EC tablet Take 81 mg by mouth daily. Yes Historical Provider, MD   Scheduled Meds:   [START ON 11/2/2018] allopurinol  300 mg Oral Daily    [START ON 11/2/2018] aspirin  81 mg Oral Daily    [START ON 11/2/2018] atorvastatin  80 mg Oral Daily    lisinopril  10 mg Oral BID    metoprolol tartrate  50 mg Oral BID    sodium chloride flush  10 mL Intravenous 2 times per day    [START ON 11/2/2018] hydrochlorothiazide  25 mg Oral Daily     Continuous Infusions:   sodium chloride      DOPamine      sodium chloride       PRN Meds:.ondansetron, magnesium sulfate, potassium chloride **OR** potassium chloride **OR** potassium chloride, sodium chloride flush, acetaminophen, atropine, magnesium hydroxide    Allergies   Allergen Reactions    Erythromycin Hives    Sulfa Antibiotics Other (See Comments)     PT DOESN'T REMEMBER    Vasotec [Enalapril] Other (See Comments)     cough     Family History   Problem Relation Age of Onset    Alzheimer's Disease Mother     Cancer Father     Heart Disease Brother     Hypertension Brother     Stroke Brother      Social History     Social History    Marital status:      Spouse name: N/A    Number of children: N/A    Years of education: N/A     Occupational History    Not on file.      Social History Main Topics    Smoking status: Never Smoker    Smokeless tobacco: Never Used    Alcohol use Yes      Comment: GLUCOSEU NEGATIVE 11/01/2018         Physical Exam:  Vitals:    11/01/18 2040   BP: (!) 155/75   Pulse: 80   Resp: 14   Temp:    SpO2: 94%      Intake/Output Summary (Last 24 hours) at 11/01/18 2053  Last data filed at 11/01/18 1553   Gross per 24 hour   Intake             1000 ml   Output                0 ml   Net             1000 ml      General:  No acute distress  Neck: Supple, no JVD, no carotid bruits  Heart: Bradycardic, regular, mechanical S2  Lungs: clear to ascultation no rales, wheezes, or rhonchi  Abdomen: positive bowel sounds, soft, non-tender, non-distended, no bruits, no masses  Extremities:no clubbing, cyanosis or edema  Neurologic: alert and oriented x 3, cranial nerves 2-12 grossly intact, motor and sensory intact, moving all extremities  Skin: No rashes    Assessment:  [facility]  S/p mechanical AVR  CAD - 2016 Lexiscan without ischemia  On Chronic BB therapy  Preserved EF    Plan:  1. Emergency TVP pacemaker placement with ultrasound guidance in the R IJ given elevated INR  2. Hold Coumadin  3. Start Heparin once INR < 2.5 in anticipation for possible PM due to mechanical AVR  4. Hold BB  5. ICU care  6. Re-check rhythm tomorrow off BB therapy, if still in CHB, consult EP for PM implantation  7. Pacer pads on  8. Atropine at bedside  9. Further recommendations based on results and clinical course    Thank you for allowing us to participate in the care of this patient. Please do not hesitate to call us with questions. Greater than 60 minutes of time was spent managing critical issues, discussing with the family, and coordinating care.       Electronically signed by Silvana Ly MD on 11/1/2018 at 8:53 PM    Interventional Cardiology - [facility]

## 2018-11-02 NOTE — CONSULTS
was wearing telemetry when the second episode occurred and it showed transient ventricular asystole. The patient's resting ECG shows LBBB, with a IN interval greater than 500 ms. He has a TVP in place. I explained to the patient that he now requires a permanent pacemaker. I also recommend since the patient is 68years old ans will be pacing 100% of the time from the RV, that I implant a biventricular pacemaker to reduce the risk of the patient developing RV pacing induced cardiomyopathy. I explained to the patient how the procedure is performed and the benefits and potential complications. The patient and his wife understood the information and consents to the plan of care. I also discussed the issue of anticoagulation. The coumadin was stopped. Since he has a normal EF and the valve is in the aortic position, there is a very low risk of valve thrombosis and I do not recommend covering with Lovenox or IV heparin. The patient and his wife understood this information as well. PLAN:  1. Continue backup pacing with external TVP. 2. Allow INR to drift to normal or near normal level. 3. Insertion of an Abbott biventricular pacemaker on Monday.            Electronically signed by Narcisa Doss MD on 11/2/2018 at 12:37 PM

## 2018-11-02 NOTE — PROCEDURES
and amenable to the plan.       Eric Scruggs MD    D: 11/02/2018 9:16:40       T: 11/02/2018 10:04:39     [email or web address]  Job#: [id number]     Doc#: [id number]    CC:

## 2018-11-02 NOTE — PROGRESS NOTES
2015 - Patient arrived to unit from cath lab via bed. Patient admitted to 4D-17 and placed on continuous ICU bedside monitor. Patient observed to be ventricular paced with HR paced at 80 bpm. Vitals obtained and charted. Assessment completed at this time. See flowsheets for assessment details. Patient alert and responsive at time of admission. Dressing to right IJ introducer noted to be clean, dry and intact. 0.9% NaCl infusing at a rate of 20mL/hr via side port of introducer. 0.9% NaCl infusing into 20g peripheral IV in left antecubital at a rate of 50mL/hr. Patient c/o discomfort at right IJ introducer site. 2029 - Patient c/o nausea at this time. Patient states \"feel like throwing up. \" Patient's HOB elevated to 45 degrees. Emesis bag provided to patient. PRN Zofran administered, per order, at this time and effective in alleviating nausea. Cool wash cloth applied to forehead. 2035 - Patient's spouse at bedside at this time. All questions answered. Back-up pacer placed at bedside with new battery placed in back-up pacer. External pacer pads remain on patient at this time. 2044 Sherman Lanier PA-C messaged via [employer] and updated regarding patient's admission and placement of temporary pacemaker per Dr. Sejal Garcia. Clarification requested regarding coumadin order, IVF rate and blood pressure medications ordered. Updated regarding current vital signs as well. Awaiting response at present time. 2048 - [facility] orders received to hold Lopressor, okay to administer ordered Lisinopril, D/C order for 0.9% NaCl at 75mL/hr and have 0.9% NaCl infuse at a rate of 50mL/hr and to hold coumadin tonight and check PT/INR in AM.     2053 - [facility], as patient has a mechanical valve. Updated regarding most recent INR level. Awaiting response at present.      2054 - Received return response from

## 2018-11-03 LAB
ANION GAP SERPL CALCULATED.3IONS-SCNC: 10 MEQ/L (ref 8–16)
BASOPHILS # BLD: 0.6 %
BASOPHILS ABSOLUTE: 0.1 THOU/MM3 (ref 0–0.1)
BUN BLDV-MCNC: 11 MG/DL (ref 7–22)
CALCIUM SERPL-MCNC: 8.7 MG/DL (ref 8.5–10.5)
CHLORIDE BLD-SCNC: 106 MEQ/L (ref 98–111)
CO2: 24 MEQ/L (ref 23–33)
CREAT SERPL-MCNC: 0.6 MG/DL (ref 0.4–1.2)
EOSINOPHIL # BLD: 1 %
EOSINOPHILS ABSOLUTE: 0.1 THOU/MM3 (ref 0–0.4)
ERYTHROCYTE [DISTWIDTH] IN BLOOD BY AUTOMATED COUNT: 13 % (ref 11.5–14.5)
ERYTHROCYTE [DISTWIDTH] IN BLOOD BY AUTOMATED COUNT: 42.7 FL (ref 35–45)
GFR SERPL CREATININE-BSD FRML MDRD: > 90 ML/MIN/1.73M2
GLUCOSE BLD-MCNC: 101 MG/DL (ref 70–108)
GLUCOSE BLD-MCNC: 119 MG/DL (ref 70–108)
GLUCOSE BLD-MCNC: 123 MG/DL (ref 70–108)
GLUCOSE BLD-MCNC: 125 MG/DL (ref 70–108)
GLUCOSE BLD-MCNC: 132 MG/DL (ref 70–108)
HCT VFR BLD CALC: 39.1 % (ref 42–52)
HEMOGLOBIN: 13.5 GM/DL (ref 14–18)
IMMATURE GRANS (ABS): 0.03 THOU/MM3 (ref 0–0.07)
IMMATURE GRANULOCYTES: 0.4 %
LV EF: 48 %
LVEF MODALITY: NORMAL
LYMPHOCYTES # BLD: 20.5 %
LYMPHOCYTES ABSOLUTE: 1.7 THOU/MM3 (ref 1–4.8)
MCH RBC QN AUTO: 31 PG (ref 26–33)
MCHC RBC AUTO-ENTMCNC: 34.5 GM/DL (ref 32.2–35.5)
MCV RBC AUTO: 89.7 FL (ref 80–94)
MONOCYTES # BLD: 9.5 %
MONOCYTES ABSOLUTE: 0.8 THOU/MM3 (ref 0.4–1.3)
MRSA SCREEN: NORMAL
NUCLEATED RED BLOOD CELLS: 0 /100 WBC
PLATELET # BLD: 224 THOU/MM3 (ref 130–400)
PMV BLD AUTO: 10.2 FL (ref 9.4–12.4)
POTASSIUM SERPL-SCNC: 3.9 MEQ/L (ref 3.5–5.2)
RBC # BLD: 4.36 MILL/MM3 (ref 4.7–6.1)
SEG NEUTROPHILS: 68 %
SEGMENTED NEUTROPHILS ABSOLUTE COUNT: 5.7 THOU/MM3 (ref 1.8–7.7)
SODIUM BLD-SCNC: 140 MEQ/L (ref 135–145)
WBC # BLD: 8.4 THOU/MM3 (ref 4.8–10.8)

## 2018-11-03 PROCEDURE — 2000000000 HC ICU R&B

## 2018-11-03 PROCEDURE — 6370000000 HC RX 637 (ALT 250 FOR IP): Performed by: INTERNAL MEDICINE

## 2018-11-03 PROCEDURE — 99233 SBSQ HOSP IP/OBS HIGH 50: CPT | Performed by: INTERNAL MEDICINE

## 2018-11-03 PROCEDURE — 80048 BASIC METABOLIC PNL TOTAL CA: CPT

## 2018-11-03 PROCEDURE — 99231 SBSQ HOSP IP/OBS SF/LOW 25: CPT | Performed by: NURSE PRACTITIONER

## 2018-11-03 PROCEDURE — 82948 REAGENT STRIP/BLOOD GLUCOSE: CPT

## 2018-11-03 PROCEDURE — 36415 COLL VENOUS BLD VENIPUNCTURE: CPT

## 2018-11-03 PROCEDURE — 85025 COMPLETE CBC W/AUTO DIFF WBC: CPT

## 2018-11-03 PROCEDURE — 2709999900 HC NON-CHARGEABLE SUPPLY

## 2018-11-03 PROCEDURE — 93306 TTE W/DOPPLER COMPLETE: CPT

## 2018-11-03 PROCEDURE — 2580000003 HC RX 258: Performed by: INTERNAL MEDICINE

## 2018-11-03 PROCEDURE — 6370000000 HC RX 637 (ALT 250 FOR IP): Performed by: NURSE PRACTITIONER

## 2018-11-03 RX ADMIN — LISINOPRIL 10 MG: 10 TABLET ORAL at 07:55

## 2018-11-03 RX ADMIN — ATORVASTATIN CALCIUM 80 MG: 80 TABLET, FILM COATED ORAL at 20:51

## 2018-11-03 RX ADMIN — Medication 10 ML: at 07:56

## 2018-11-03 RX ADMIN — Medication 10 ML: at 20:52

## 2018-11-03 RX ADMIN — ASPIRIN 81 MG: 81 TABLET, COATED ORAL at 07:55

## 2018-11-03 RX ADMIN — ACETAMINOPHEN 650 MG: 325 TABLET ORAL at 17:56

## 2018-11-03 RX ADMIN — ALLOPURINOL 300 MG: 300 TABLET ORAL at 07:55

## 2018-11-03 RX ADMIN — LISINOPRIL 10 MG: 10 TABLET ORAL at 20:51

## 2018-11-03 ASSESSMENT — PAIN SCALES - GENERAL
PAINLEVEL_OUTOF10: 0

## 2018-11-03 NOTE — PROGRESS NOTES
gallops, distal pulses intact, Rt IJ TVP  Pulmonary/Chest: clear to auscultation bilaterally- no wheezes, rales or rhonchi, normal air movement, no respiratory distress  Abdomen: soft, non-tender, non-distended, normal bowel sounds, no masses Extremities: no cyanosis, clubbing or edema, pulses present  Skin: warm and dry, no rash or erythema  Head: normocephalic and atraumatic  Musculoskeletal: normal range of motion, no joint swelling, deformity or tenderness  Neurological: alert, oriented, normal speech, no focal findings or movement disorder noted    Medications:    insulin lispro  0-6 Units Subcutaneous TID WC    insulin lispro  0-3 Units Subcutaneous Nightly    [START ON 2018] bacitracin in 0.9 % sodium chloride irrigation  50,000 Units Topical Once    lisinopril  10 mg Oral BID    allopurinol  300 mg Oral Daily    aspirin  81 mg Oral Daily    atorvastatin  80 mg Oral Daily    sodium chloride flush  10 mL Intravenous 2 times per day      dextrose      sodium chloride 50 mL/hr at 18 1505    DOPamine         glucose 15 g PRN   dextrose 12.5 g PRN   glucagon (rDNA) 1 mg PRN   dextrose 100 mL/hr PRN   ondansetron 4 mg Q6H PRN   magnesium sulfate 1 g PRN   potassium chloride 40 mEq PRN   Or     potassium chloride 40 mEq PRN   Or     potassium chloride 10 mEq PRN   sodium chloride flush 10 mL PRN   acetaminophen 650 mg Q4H PRN   magnesium hydroxide 30 mL Daily PRN       Diagnostics:  EK2018 17:23:29 [facility] ROUTINE RETRIEVAL  Sinus rhythm with 2nd degree A-V block (Mobitz I)  Left bundle branch block  Abnormal ECG  When compared with ECG of 2018 15:01,  Aberrant conduction is no longer Present  Sinus rhythm is now with 2nd degree A-V block (Mobitz I)    Echo: pending  Electronically signed by Elder Pacheco MD (Interpreting   physician) on 2017 at 09:01 AM   ----------------------------------------------------------------      Findings      [place]

## 2018-11-03 NOTE — PROGRESS NOTES
Intensive Care Unit  Intensivist Progress Note    Patient: Geovanny Quiles  : 1945  MRN#: [id number]  11/3/2018 1:06 PM  ADMISSION DAY:  2018 12:58 PM     Subjective:    Patient is up to chair  Has TVP per Right IJ  TVP was turned down to 40. He is in a fib with rate     Patient Vitals for the past 8 hrs:   BP Temp Temp src Pulse Resp SpO2 Weight   18 1305 - - - - 19 96 % -   18 1202 98/76 - - 63 9 93 % -   18 1125 - 98.4 °F (36.9 °C) Oral - - - -   18 1102 - - - (!) 49 10 - -   18 1002 (!) 131/48 - - 69 19 95 % -   18 0902 (!) 157/63 - - 71 16 95 % -   18 0802 123/68 98.6 °F (37 °C) Oral 66 15 95 % 193 lb 2 oz (87.6 kg)   18 0601 (!) 132/56 - - 62 14 96 % -       EXAM:  General: No distress. Eyes: PERRL. No sclera icterus. No conjunctival injection. ENT: No discharge. Pharynx clear. Neck: Trachea midline. Normal thyroid. Resp: No accessory muscle use. No crackles. No wheezing. No rhonchi. No dullness on percussion. CV: Regular rate. Regular rhythm. Murmur, click  Abd: Non-tender. Non-distended. No masses. No organmegaly. Normal bowel sounds. No hernia. Skin: Warm and dry. No nodule on exposed extremities. No rash on exposed extremities. Lymph: No cervical LAD. No supraclavicular LAD. M/S: No cyanosis. No joint deformity. No clubbing. Neuro: Awake. Follows commands. Psych: Oriented to person, place, time. No anxiety or agitation. Intake/Output Summary (Last 24 hours) at 18 1306  Last data filed at 18 0900   Gross per 24 hour   Intake          3525.17 ml   Output             1150 ml   Net          2375.17 ml     I/O last 3 completed shifts:   In: 3165.2 [P.O.:590; I.V.:2575.2]  Out: 1100 [Urine:1100]     Date 18 - 18   Shift  3700-8883 5604-6025 24 Hour Total   I  N  T  A  K  E   P.O.  (mL/kg/hr) 250  (0.3) 360  610    I.V.  (mL/kg) 463  (5)   463  (5.3)    Shift Total  (mL/kg) 713  (7.7)

## 2018-11-04 LAB
ANION GAP SERPL CALCULATED.3IONS-SCNC: 10 MEQ/L (ref 8–16)
BUN BLDV-MCNC: 11 MG/DL (ref 7–22)
CALCIUM SERPL-MCNC: 8.7 MG/DL (ref 8.5–10.5)
CHLORIDE BLD-SCNC: 106 MEQ/L (ref 98–111)
CO2: 23 MEQ/L (ref 23–33)
CREAT SERPL-MCNC: 0.6 MG/DL (ref 0.4–1.2)
EKG ATRIAL RATE: 394 BPM
EKG ATRIAL RATE: 64 BPM
EKG ATRIAL RATE: 74 BPM
EKG P AXIS: 47 DEGREES
EKG Q-T INTERVAL: 458 MS
EKG Q-T INTERVAL: 474 MS
EKG Q-T INTERVAL: 510 MS
EKG QRS DURATION: 134 MS
EKG QRS DURATION: 142 MS
EKG QRS DURATION: 142 MS
EKG QTC CALCULATION (BAZETT): 465 MS
EKG QTC CALCULATION (BAZETT): 478 MS
EKG QTC CALCULATION (BAZETT): 487 MS
EKG R AXIS: -2 DEGREES
EKG R AXIS: -7 DEGREES
EKG R AXIS: 1 DEGREES
EKG T AXIS: 113 DEGREES
EKG T AXIS: 133 DEGREES
EKG T AXIS: 157 DEGREES
EKG VENTRICULAR RATE: 53 BPM
EKG VENTRICULAR RATE: 58 BPM
EKG VENTRICULAR RATE: 68 BPM
GFR SERPL CREATININE-BSD FRML MDRD: > 90 ML/MIN/1.73M2
GLUCOSE BLD-MCNC: 110 MG/DL (ref 70–108)
GLUCOSE BLD-MCNC: 112 MG/DL (ref 70–108)
GLUCOSE BLD-MCNC: 118 MG/DL (ref 70–108)
GLUCOSE BLD-MCNC: 119 MG/DL (ref 70–108)
GLUCOSE BLD-MCNC: 123 MG/DL (ref 70–108)
INR BLD: 2.42 (ref 0.85–1.13)
MAGNESIUM: 1.8 MG/DL (ref 1.6–2.4)
POTASSIUM SERPL-SCNC: 4.1 MEQ/L (ref 3.5–5.2)
SODIUM BLD-SCNC: 139 MEQ/L (ref 135–145)

## 2018-11-04 PROCEDURE — 2580000003 HC RX 258: Performed by: INTERNAL MEDICINE

## 2018-11-04 PROCEDURE — 2000000000 HC ICU R&B

## 2018-11-04 PROCEDURE — 85610 PROTHROMBIN TIME: CPT

## 2018-11-04 PROCEDURE — 93010 ELECTROCARDIOGRAM REPORT: CPT | Performed by: INTERNAL MEDICINE

## 2018-11-04 PROCEDURE — 82948 REAGENT STRIP/BLOOD GLUCOSE: CPT

## 2018-11-04 PROCEDURE — 83735 ASSAY OF MAGNESIUM: CPT

## 2018-11-04 PROCEDURE — 6370000000 HC RX 637 (ALT 250 FOR IP): Performed by: INTERNAL MEDICINE

## 2018-11-04 PROCEDURE — 99233 SBSQ HOSP IP/OBS HIGH 50: CPT | Performed by: INTERNAL MEDICINE

## 2018-11-04 PROCEDURE — 36415 COLL VENOUS BLD VENIPUNCTURE: CPT

## 2018-11-04 PROCEDURE — 80048 BASIC METABOLIC PNL TOTAL CA: CPT

## 2018-11-04 PROCEDURE — 6370000000 HC RX 637 (ALT 250 FOR IP): Performed by: NURSE PRACTITIONER

## 2018-11-04 PROCEDURE — 2709999900 HC NON-CHARGEABLE SUPPLY

## 2018-11-04 RX ADMIN — ATORVASTATIN CALCIUM 80 MG: 80 TABLET, FILM COATED ORAL at 20:34

## 2018-11-04 RX ADMIN — Medication 10 ML: at 20:35

## 2018-11-04 RX ADMIN — Medication 10 ML: at 09:30

## 2018-11-04 RX ADMIN — LISINOPRIL 10 MG: 10 TABLET ORAL at 20:34

## 2018-11-04 RX ADMIN — ASPIRIN 81 MG: 81 TABLET, COATED ORAL at 09:30

## 2018-11-04 RX ADMIN — LISINOPRIL 10 MG: 10 TABLET ORAL at 09:30

## 2018-11-04 RX ADMIN — SODIUM CHLORIDE: 9 INJECTION, SOLUTION INTRAVENOUS at 22:16

## 2018-11-04 RX ADMIN — ALLOPURINOL 300 MG: 300 TABLET ORAL at 09:30

## 2018-11-04 ASSESSMENT — PAIN SCALES - GENERAL
PAINLEVEL_OUTOF10: 0

## 2018-11-05 ENCOUNTER — APPOINTMENT (OUTPATIENT)
Dept: GENERAL RADIOLOGY | Age: 73
DRG: 244 | End: 2018-11-05
Payer: MEDICARE

## 2018-11-05 ENCOUNTER — ANESTHESIA (OUTPATIENT)
Dept: CARDIAC CATH/INVASIVE PROCEDURES | Age: 73
DRG: 244 | End: 2018-11-05
Payer: MEDICARE

## 2018-11-05 ENCOUNTER — ANESTHESIA EVENT (OUTPATIENT)
Dept: CARDIAC CATH/INVASIVE PROCEDURES | Age: 73
DRG: 244 | End: 2018-11-05
Payer: MEDICARE

## 2018-11-05 ENCOUNTER — APPOINTMENT (OUTPATIENT)
Dept: CARDIAC CATH/INVASIVE PROCEDURES | Age: 73
End: 2018-11-05
Payer: MEDICARE

## 2018-11-05 VITALS — SYSTOLIC BLOOD PRESSURE: 163 MMHG | DIASTOLIC BLOOD PRESSURE: 74 MMHG | OXYGEN SATURATION: 95 %

## 2018-11-05 LAB
GLUCOSE BLD-MCNC: 109 MG/DL (ref 70–108)
GLUCOSE BLD-MCNC: 123 MG/DL (ref 70–108)
GLUCOSE BLD-MCNC: 166 MG/DL (ref 70–108)
GLUCOSE BLD-MCNC: 250 MG/DL (ref 70–108)
INR BLD: 1.76 (ref 0.85–1.13)

## 2018-11-05 PROCEDURE — 2580000003 HC RX 258: Performed by: INTERNAL MEDICINE

## 2018-11-05 PROCEDURE — C1900 LEAD, CORONARY VENOUS: HCPCS

## 2018-11-05 PROCEDURE — 99233 SBSQ HOSP IP/OBS HIGH 50: CPT | Performed by: INTERNAL MEDICINE

## 2018-11-05 PROCEDURE — 6370000000 HC RX 637 (ALT 250 FOR IP)

## 2018-11-05 PROCEDURE — 3700000000 HC ANESTHESIA ATTENDED CARE

## 2018-11-05 PROCEDURE — 0JH606Z INSERTION OF PACEMAKER, DUAL CHAMBER INTO CHEST SUBCUTANEOUS TISSUE AND FASCIA, OPEN APPROACH: ICD-10-PCS | Performed by: INTERNAL MEDICINE

## 2018-11-05 PROCEDURE — 6360000002 HC RX W HCPCS: Performed by: NURSE ANESTHETIST, CERTIFIED REGISTERED

## 2018-11-05 PROCEDURE — 6360000004 HC RX CONTRAST MEDICATION: Performed by: INTERNAL MEDICINE

## 2018-11-05 PROCEDURE — 7100000001 HC PACU RECOVERY - ADDTL 15 MIN

## 2018-11-05 PROCEDURE — 2709999900 HC NON-CHARGEABLE SUPPLY

## 2018-11-05 PROCEDURE — 2000000000 HC ICU R&B

## 2018-11-05 PROCEDURE — 6370000000 HC RX 637 (ALT 250 FOR IP): Performed by: INTERNAL MEDICINE

## 2018-11-05 PROCEDURE — C1894 INTRO/SHEATH, NON-LASER: HCPCS

## 2018-11-05 PROCEDURE — 71045 X-RAY EXAM CHEST 1 VIEW: CPT

## 2018-11-05 PROCEDURE — 33225 L VENTRIC PACING LEAD ADD-ON: CPT | Performed by: INTERNAL MEDICINE

## 2018-11-05 PROCEDURE — 6360000002 HC RX W HCPCS: Performed by: INTERNAL MEDICINE

## 2018-11-05 PROCEDURE — C1898 LEAD, PMKR, OTHER THAN TRANS: HCPCS

## 2018-11-05 PROCEDURE — C1893 INTRO/SHEATH, FIXED,NON-PEEL: HCPCS

## 2018-11-05 PROCEDURE — 7100000000 HC PACU RECOVERY - FIRST 15 MIN

## 2018-11-05 PROCEDURE — 33225 L VENTRIC PACING LEAD ADD-ON: CPT

## 2018-11-05 PROCEDURE — 02H63JZ INSERTION OF PACEMAKER LEAD INTO RIGHT ATRIUM, PERCUTANEOUS APPROACH: ICD-10-PCS | Performed by: INTERNAL MEDICINE

## 2018-11-05 PROCEDURE — 85610 PROTHROMBIN TIME: CPT

## 2018-11-05 PROCEDURE — 82948 REAGENT STRIP/BLOOD GLUCOSE: CPT

## 2018-11-05 PROCEDURE — 02HK3JZ INSERTION OF PACEMAKER LEAD INTO RIGHT VENTRICLE, PERCUTANEOUS APPROACH: ICD-10-PCS | Performed by: INTERNAL MEDICINE

## 2018-11-05 PROCEDURE — 33208 INSRT HEART PM ATRIAL & VENT: CPT | Performed by: INTERNAL MEDICINE

## 2018-11-05 PROCEDURE — C2621 PMKR, OTHER THAN SING/DUAL: HCPCS

## 2018-11-05 PROCEDURE — 33208 INSRT HEART PM ATRIAL & VENT: CPT

## 2018-11-05 PROCEDURE — 3700000001 HC ADD 15 MINUTES (ANESTHESIA)

## 2018-11-05 PROCEDURE — 2500000003 HC RX 250 WO HCPCS: Performed by: NURSE ANESTHETIST, CERTIFIED REGISTERED

## 2018-11-05 PROCEDURE — 6370000000 HC RX 637 (ALT 250 FOR IP): Performed by: NURSE PRACTITIONER

## 2018-11-05 PROCEDURE — C1769 GUIDE WIRE: HCPCS

## 2018-11-05 PROCEDURE — 36415 COLL VENOUS BLD VENIPUNCTURE: CPT

## 2018-11-05 RX ORDER — HYDROCODONE BITARTRATE AND ACETAMINOPHEN 5; 325 MG/1; MG/1
2 TABLET ORAL EVERY 4 HOURS PRN
Status: DISCONTINUED | OUTPATIENT
Start: 2018-11-05 | End: 2018-11-06 | Stop reason: HOSPADM

## 2018-11-05 RX ORDER — ACETAMINOPHEN 325 MG/1
TABLET ORAL
Status: COMPLETED
Start: 2018-11-05 | End: 2018-11-05

## 2018-11-05 RX ORDER — FENTANYL CITRATE 50 UG/ML
INJECTION, SOLUTION INTRAMUSCULAR; INTRAVENOUS PRN
Status: DISCONTINUED | OUTPATIENT
Start: 2018-11-05 | End: 2018-11-05 | Stop reason: SDUPTHER

## 2018-11-05 RX ORDER — PROPOFOL 10 MG/ML
INJECTION, EMULSION INTRAVENOUS PRN
Status: DISCONTINUED | OUTPATIENT
Start: 2018-11-05 | End: 2018-11-05 | Stop reason: SDUPTHER

## 2018-11-05 RX ORDER — NEOSTIGMINE METHYLSULFATE 1 MG/ML
INJECTION, SOLUTION INTRAVENOUS PRN
Status: DISCONTINUED | OUTPATIENT
Start: 2018-11-05 | End: 2018-11-05 | Stop reason: SDUPTHER

## 2018-11-05 RX ORDER — ONDANSETRON 2 MG/ML
4 INJECTION INTRAMUSCULAR; INTRAVENOUS
Status: DISCONTINUED | OUTPATIENT
Start: 2018-11-05 | End: 2018-11-05

## 2018-11-05 RX ORDER — LIDOCAINE HYDROCHLORIDE 20 MG/ML
INJECTION, SOLUTION INFILTRATION; PERINEURAL PRN
Status: DISCONTINUED | OUTPATIENT
Start: 2018-11-05 | End: 2018-11-05 | Stop reason: SDUPTHER

## 2018-11-05 RX ORDER — ROCURONIUM BROMIDE 10 MG/ML
INJECTION, SOLUTION INTRAVENOUS PRN
Status: DISCONTINUED | OUTPATIENT
Start: 2018-11-05 | End: 2018-11-05 | Stop reason: SDUPTHER

## 2018-11-05 RX ORDER — FENTANYL CITRATE 50 UG/ML
50 INJECTION, SOLUTION INTRAMUSCULAR; INTRAVENOUS EVERY 5 MIN PRN
Status: DISCONTINUED | OUTPATIENT
Start: 2018-11-05 | End: 2018-11-05

## 2018-11-05 RX ORDER — ONDANSETRON 2 MG/ML
4 INJECTION INTRAMUSCULAR; INTRAVENOUS EVERY 6 HOURS PRN
Status: DISCONTINUED | OUTPATIENT
Start: 2018-11-05 | End: 2018-11-06 | Stop reason: HOSPADM

## 2018-11-05 RX ORDER — ONDANSETRON 2 MG/ML
INJECTION INTRAMUSCULAR; INTRAVENOUS PRN
Status: DISCONTINUED | OUTPATIENT
Start: 2018-11-05 | End: 2018-11-05 | Stop reason: SDUPTHER

## 2018-11-05 RX ORDER — LABETALOL HYDROCHLORIDE 5 MG/ML
5 INJECTION, SOLUTION INTRAVENOUS EVERY 10 MIN PRN
Status: DISCONTINUED | OUTPATIENT
Start: 2018-11-05 | End: 2018-11-05

## 2018-11-05 RX ORDER — GLYCOPYRROLATE 1 MG/5 ML
SYRINGE (ML) INTRAVENOUS PRN
Status: DISCONTINUED | OUTPATIENT
Start: 2018-11-05 | End: 2018-11-05 | Stop reason: SDUPTHER

## 2018-11-05 RX ORDER — HYDROCODONE BITARTRATE AND ACETAMINOPHEN 5; 325 MG/1; MG/1
1 TABLET ORAL EVERY 4 HOURS PRN
Status: DISCONTINUED | OUTPATIENT
Start: 2018-11-05 | End: 2018-11-06 | Stop reason: HOSPADM

## 2018-11-05 RX ORDER — MEPERIDINE HYDROCHLORIDE 25 MG/ML
12.5 INJECTION INTRAMUSCULAR; INTRAVENOUS; SUBCUTANEOUS EVERY 5 MIN PRN
Status: DISCONTINUED | OUTPATIENT
Start: 2018-11-05 | End: 2018-11-05

## 2018-11-05 RX ORDER — ACETAMINOPHEN 325 MG/1
650 TABLET ORAL EVERY 4 HOURS PRN
Status: DISCONTINUED | OUTPATIENT
Start: 2018-11-05 | End: 2018-11-06 | Stop reason: HOSPADM

## 2018-11-05 RX ADMIN — Medication 2 G: at 20:44

## 2018-11-05 RX ADMIN — ACETAMINOPHEN 650 MG: 325 TABLET ORAL at 17:36

## 2018-11-05 RX ADMIN — PHENYLEPHRINE HYDROCHLORIDE 100 MCG: 10 INJECTION INTRAVENOUS at 14:07

## 2018-11-05 RX ADMIN — VANCOMYCIN HYDROCHLORIDE 1500 MG: 1 INJECTION, POWDER, LYOPHILIZED, FOR SOLUTION INTRAVENOUS at 13:18

## 2018-11-05 RX ADMIN — LISINOPRIL 10 MG: 10 TABLET ORAL at 20:44

## 2018-11-05 RX ADMIN — PROPOFOL 150 MG: 10 INJECTION, EMULSION INTRAVENOUS at 13:51

## 2018-11-05 RX ADMIN — LISINOPRIL 10 MG: 10 TABLET ORAL at 08:38

## 2018-11-05 RX ADMIN — Medication 10 ML: at 20:44

## 2018-11-05 RX ADMIN — HYDROCODONE BITARTRATE AND ACETAMINOPHEN 1 TABLET: 5; 325 TABLET ORAL at 19:48

## 2018-11-05 RX ADMIN — NEOSTIGMINE METHYLSULFATE 3 MG: 1 INJECTION, SOLUTION INTRAVENOUS at 16:08

## 2018-11-05 RX ADMIN — ALLOPURINOL 300 MG: 300 TABLET ORAL at 08:38

## 2018-11-05 RX ADMIN — ATORVASTATIN CALCIUM 80 MG: 80 TABLET, FILM COATED ORAL at 20:44

## 2018-11-05 RX ADMIN — ASPIRIN 81 MG: 81 TABLET, COATED ORAL at 08:38

## 2018-11-05 RX ADMIN — ROCURONIUM BROMIDE 50 MG: 10 INJECTION INTRAVENOUS at 13:51

## 2018-11-05 RX ADMIN — ONDANSETRON 4 MG: 2 INJECTION INTRAMUSCULAR; INTRAVENOUS at 15:44

## 2018-11-05 RX ADMIN — Medication 10 ML: at 08:39

## 2018-11-05 RX ADMIN — LIDOCAINE HYDROCHLORIDE 60 MG: 20 INJECTION, SOLUTION INFILTRATION; PERINEURAL at 13:51

## 2018-11-05 RX ADMIN — WARFARIN SODIUM 12.5 MG: 2.5 TABLET ORAL at 19:48

## 2018-11-05 RX ADMIN — IOPAMIDOL 20 ML: 755 INJECTION, SOLUTION INTRAVENOUS at 16:48

## 2018-11-05 RX ADMIN — FENTANYL CITRATE 50 MCG: 50 INJECTION INTRAMUSCULAR; INTRAVENOUS at 14:43

## 2018-11-05 RX ADMIN — FENTANYL CITRATE 50 MCG: 50 INJECTION INTRAMUSCULAR; INTRAVENOUS at 13:51

## 2018-11-05 RX ADMIN — Medication 0.6 MG: at 16:08

## 2018-11-05 ASSESSMENT — PULMONARY FUNCTION TESTS
PIF_VALUE: 19
PIF_VALUE: 1
PIF_VALUE: 20
PIF_VALUE: 19
PIF_VALUE: 18
PIF_VALUE: 1
PIF_VALUE: 19
PIF_VALUE: 20
PIF_VALUE: 1
PIF_VALUE: 1
PIF_VALUE: 19
PIF_VALUE: 1
PIF_VALUE: 20
PIF_VALUE: 1
PIF_VALUE: 19
PIF_VALUE: 1
PIF_VALUE: 20
PIF_VALUE: 18
PIF_VALUE: 19
PIF_VALUE: 1
PIF_VALUE: 1
PIF_VALUE: 20
PIF_VALUE: 18
PIF_VALUE: 19
PIF_VALUE: 1
PIF_VALUE: 20
PIF_VALUE: 18
PIF_VALUE: 3
PIF_VALUE: 19
PIF_VALUE: 1
PIF_VALUE: 19
PIF_VALUE: 19
PIF_VALUE: 1
PIF_VALUE: 19
PIF_VALUE: 1
PIF_VALUE: 20
PIF_VALUE: 20
PIF_VALUE: 1
PIF_VALUE: 3
PIF_VALUE: 19
PIF_VALUE: 1
PIF_VALUE: 20
PIF_VALUE: 1
PIF_VALUE: 19
PIF_VALUE: 1
PIF_VALUE: 19
PIF_VALUE: 20
PIF_VALUE: 18
PIF_VALUE: 1
PIF_VALUE: 18
PIF_VALUE: 1
PIF_VALUE: 24
PIF_VALUE: 19
PIF_VALUE: 1
PIF_VALUE: 18
PIF_VALUE: 20
PIF_VALUE: 1
PIF_VALUE: 15
PIF_VALUE: 21
PIF_VALUE: 19
PIF_VALUE: 19
PIF_VALUE: 1
PIF_VALUE: 20
PIF_VALUE: 18
PIF_VALUE: 20
PIF_VALUE: 19
PIF_VALUE: 18
PIF_VALUE: 1
PIF_VALUE: 1
PIF_VALUE: 19
PIF_VALUE: 18
PIF_VALUE: 18
PIF_VALUE: 1
PIF_VALUE: 28
PIF_VALUE: 1
PIF_VALUE: 20
PIF_VALUE: 1
PIF_VALUE: 19
PIF_VALUE: 19
PIF_VALUE: 1
PIF_VALUE: 18
PIF_VALUE: 19
PIF_VALUE: 20
PIF_VALUE: 1
PIF_VALUE: 18
PIF_VALUE: 21
PIF_VALUE: 20
PIF_VALUE: 19
PIF_VALUE: 1
PIF_VALUE: 19
PIF_VALUE: 1
PIF_VALUE: 20
PIF_VALUE: 1
PIF_VALUE: 18
PIF_VALUE: 1
PIF_VALUE: 1
PIF_VALUE: 2
PIF_VALUE: 18
PIF_VALUE: 20
PIF_VALUE: 20
PIF_VALUE: 1
PIF_VALUE: 1
PIF_VALUE: 20
PIF_VALUE: 1
PIF_VALUE: 20
PIF_VALUE: 20
PIF_VALUE: 19
PIF_VALUE: 20
PIF_VALUE: 18
PIF_VALUE: 1
PIF_VALUE: 20
PIF_VALUE: 20
PIF_VALUE: 1
PIF_VALUE: 20
PIF_VALUE: 20
PIF_VALUE: 1
PIF_VALUE: 18
PIF_VALUE: 20
PIF_VALUE: 1
PIF_VALUE: 1
PIF_VALUE: 29
PIF_VALUE: 20
PIF_VALUE: 20
PIF_VALUE: 18
PIF_VALUE: 19
PIF_VALUE: 20
PIF_VALUE: 19
PIF_VALUE: 19
PIF_VALUE: 1
PIF_VALUE: 19
PIF_VALUE: 1
PIF_VALUE: 18
PIF_VALUE: 1
PIF_VALUE: 1
PIF_VALUE: 19
PIF_VALUE: 18
PIF_VALUE: 20
PIF_VALUE: 1
PIF_VALUE: 20
PIF_VALUE: 21
PIF_VALUE: 19
PIF_VALUE: 20
PIF_VALUE: 1
PIF_VALUE: 1
PIF_VALUE: 20
PIF_VALUE: 19
PIF_VALUE: 1
PIF_VALUE: 19
PIF_VALUE: 34
PIF_VALUE: 1
PIF_VALUE: 21
PIF_VALUE: 2
PIF_VALUE: 19
PIF_VALUE: 20
PIF_VALUE: 1
PIF_VALUE: 20
PIF_VALUE: 1
PIF_VALUE: 18
PIF_VALUE: 18
PIF_VALUE: 1
PIF_VALUE: 30
PIF_VALUE: 1
PIF_VALUE: 3
PIF_VALUE: 19
PIF_VALUE: 20
PIF_VALUE: 20
PIF_VALUE: 1
PIF_VALUE: 1
PIF_VALUE: 18
PIF_VALUE: 18
PIF_VALUE: 1
PIF_VALUE: 19
PIF_VALUE: 1
PIF_VALUE: 18
PIF_VALUE: 1
PIF_VALUE: 1
PIF_VALUE: 20
PIF_VALUE: 20
PIF_VALUE: 1
PIF_VALUE: 19
PIF_VALUE: 19
PIF_VALUE: 1
PIF_VALUE: 19
PIF_VALUE: 20
PIF_VALUE: 1
PIF_VALUE: 20
PIF_VALUE: 19
PIF_VALUE: 19
PIF_VALUE: 1
PIF_VALUE: 18
PIF_VALUE: 19
PIF_VALUE: 19
PIF_VALUE: 1
PIF_VALUE: 19
PIF_VALUE: 1
PIF_VALUE: 24
PIF_VALUE: 20
PIF_VALUE: 18
PIF_VALUE: 1
PIF_VALUE: 20
PIF_VALUE: 18
PIF_VALUE: 20
PIF_VALUE: 1
PIF_VALUE: 1
PIF_VALUE: 20
PIF_VALUE: 1
PIF_VALUE: 18
PIF_VALUE: 1
PIF_VALUE: 1
PIF_VALUE: 19
PIF_VALUE: 20

## 2018-11-05 ASSESSMENT — ENCOUNTER SYMPTOMS
RIGHT EYE: 0
SHORTNESS OF BREATH: 0
VOMITING: 0
DOUBLE VISION: 0
NAUSEA: 0
WHEEZING: 0
ABDOMINAL PAIN: 0
COUGH: 0
LEFT EYE: 0
CONSTIPATION: 0
DIARRHEA: 0
BACK PAIN: 0
BLURRED VISION: 0
SORE THROAT: 0

## 2018-11-05 ASSESSMENT — PAIN SCALES - GENERAL
PAINLEVEL_OUTOF10: 0
PAINLEVEL_OUTOF10: 0
PAINLEVEL_OUTOF10: 5
PAINLEVEL_OUTOF10: 0
PAINLEVEL_OUTOF10: 0
PAINLEVEL_OUTOF10: 4
PAINLEVEL_OUTOF10: 0
PAINLEVEL_OUTOF10: 3

## 2018-11-05 NOTE — PROGRESS NOTES
CARDIOLOGY - ELECTROPHYSIOLOGY  PROGRESS NOTE    Date:   11/5/2018  Patient name: Kota Llamas  Date of admission:  11/1/2018 12:58 PM  MRN:   [id number]  YOB: 1945  PCP: Elina Smith MD    Reason for Admission: Syncope    Subjective: I am doing fine       Clinical Changes / Abnormalities: The patient is a 69 y/o male that was at home sitting when he started to feel very dizzy and then passed out. The episode was witnessed by the patient's spouse. He was brought to the ED and he had another episode of passing out. Telemetry showed a prolonged episode of sinus rhythm with ventricular asystole. The patient was taken to the cath lab and Dr. Moody Barrientos inserted a TVP. The patient was admitted to the ICU. He is s/p mechanical AVR and single vessel CABG ten years ago. He is followed by Dr. Liana Zaidi in Cardiology. EP is being consulted for evaluation of new onset syncope and transient complete heart block.       Past Medical History:      Diagnosis Date    CAD (coronary artery disease)     Clotting disorder (HCC)     anemic    Compartment syndrome (HCC)     Right arm    Heart murmur     Hyperlipidemia     Hypertension     Ventricular hypertrophy     left     Past Surgical History:      Procedure Laterality Date    AORTIC VALVE REPLACEMENT      sept 2008    ARM SURGERY Right     Compartment syndrome - may 2010    CARPAL TUNNEL RELEASE Bilateral     CORONARY ARTERY BYPASS GRAFT      sept 2008    DIAGNOSTIC CARDIAC CATH LAB PROCEDURE      ROTATOR CUFF REPAIR      with 3 pins placed      Past Family History:   Family History   Problem Relation Age of Onset    Alzheimer's Disease Mother     Cancer Father     Heart Disease Brother     Hypertension Brother     Stroke Brother       Past Social History:     Social History     Social History    Marital status:      Spouse name: N/A    Number of children: N/A    Years of education: N/A     Social History Main Topics    Smoking status: Never Smoker    Smokeless tobacco: Never Used    Alcohol use Yes      Comment: occasionally    Drug use: No    Sexual activity: Not Asked     Other Topics Concern    None     Social History Narrative    None       Medications:   Scheduled Meds:   vancomycin (VANCOCIN) IV  1,500 mg Intravenous Once    insulin lispro  0-6 Units Subcutaneous TID WC    insulin lispro  0-3 Units Subcutaneous Nightly    bacitracin in 0.9 % sodium chloride irrigation  50,000 Units Topical Once    lisinopril  10 mg Oral BID    allopurinol  300 mg Oral Daily    aspirin  81 mg Oral Daily    atorvastatin  80 mg Oral Daily    sodium chloride flush  10 mL Intravenous 2 times per day     Continuous Infusions:   dextrose      sodium chloride 50 mL/hr at 11/04/18 2216    DOPamine       CBC:   Recent Labs      11/03/18   0805   WBC  8.4   HGB  13.5*   PLT  224     BMP:  Recent Labs      11/03/18   0805  11/04/18   0810   NA  140  139   K  3.9  4.1   CL  106  106   CO2  24  23   BUN  11  11   CREATININE  0.6  0.6   GLUCOSE  125*  112*     Hepatic: No results for input(s): AST, ALT, ALB, BILITOT, ALKPHOS in the last 72 hours. Troponin: No results for input(s): TROPONINI in the last 72 hours. BNP: No results for input(s): BNP in the last 72 hours. Lipids: No results for input(s): CHOL, HDL in the last 72 hours. Invalid input(s): LDLCALCU  INR:   Recent Labs      11/04/18   0352  11/05/18   0621   INR  2.42*  1.76*       Objective:   REVIEW OF SYSTEMS:    Review of Systems   Constitution: Negative for fever, weight gain and weight loss. HENT: Negative for hearing loss and sore throat. Eyes: Negative for blurred vision, double vision, vision loss in left eye and vision loss in right eye. Cardiovascular: Positive for syncope. Negative for chest pain, dyspnea on exertion, irregular heartbeat, near-syncope and palpitations. Respiratory: Negative for cough, shortness of breath and wheezing.     Endocrine: Negative for cold intolerance, heat intolerance and polyuria. Hematologic/Lymphatic: Negative for bleeding problem. Does not bruise/bleed easily. Skin: Negative for dry skin, itching and rash. Musculoskeletal: Negative for arthritis, back pain, joint pain and myalgias. Gastrointestinal: Negative for abdominal pain, constipation, diarrhea, nausea and vomiting. Genitourinary: Negative for dysuria, frequency, hematuria and urgency. Neurological: Negative for dizziness, headaches, light-headedness, numbness, paresthesias, seizures and vertigo. Psychiatric/Behavioral: Negative for depression and memory loss. The patient is not nervous/anxious. PHYSICAL EXAM:  BP (!) 149/68   Pulse 64   Temp 97.3 °F (36.3 °C) (Oral)   Resp 16   Ht 5' 8\" (1.727 m)   Wt 193 lb 2 oz (87.6 kg)   SpO2 98%   BMI 29.36 kg/m²   I/O    Intake/Output Summary (Last 24 hours) at 11/05/18 1220  Last data filed at 11/05/18 1027   Gross per 24 hour   Intake             1850 ml   Output              450 ml   Net             1400 ml     Patient Vitals for the past 96 hrs (Last 3 readings):   Weight   11/03/18 0802 193 lb 2 oz (87.6 kg)   11/01/18 2026 203 lb 4.2 oz (92.2 kg)   11/01/18 1353 204 lb (92.5 kg)     Physical Exam   Constitutional: He is oriented to person, place, and time. No distress. HENT:   Head: Normocephalic and atraumatic. Head is without contusion. Right Ear: Hearing and external ear normal. No decreased hearing is noted. Left Ear: Hearing and external ear normal. No decreased hearing is noted. Nose: Nose normal. No sinus tenderness. No epistaxis. Mouth/Throat: Oropharynx is clear and moist. Mucous membranes are not dry. No oropharyngeal exudate. Eyes: Pupils are equal, round, and reactive to light. Conjunctivae and EOM are normal. Right eye exhibits no discharge. Left eye exhibits no discharge. Neck: Trachea normal. Neck supple. No JVD present. No edema present. No thyroid mass present.    Cardiovascular: Normal rate, regular rhythm, normal heart sounds and normal pulses. No extrasystoles are present. Pulmonary/Chest: Effort normal and breath sounds normal. He exhibits no tenderness. Breasts are symmetrical.   Abdominal: Soft. Normal appearance and bowel sounds are normal. He exhibits no mass. There is no hepatosplenomegaly. There is no tenderness. No hernia. Musculoskeletal: Normal range of motion. Right shoulder: Normal. He exhibits normal range of motion and no swelling. Left shoulder: Normal. He exhibits normal range of motion and no swelling. Right hip: Normal. He exhibits normal range of motion and no swelling. Left hip: Normal. He exhibits normal range of motion and no swelling. Right knee: Normal. He exhibits normal range of motion and no swelling. Left knee: Normal. He exhibits normal range of motion and no swelling. Right upper arm: Normal.        Left upper arm: Normal.        Right upper leg: Normal.        Left upper leg: Normal.        Right lower leg: Normal.        Left lower leg: Normal.   Lymphadenopathy:        Head (right side): No submandibular adenopathy present. Head (left side): No submandibular adenopathy present. Neurological: He is alert and oriented to person, place, and time. He has normal strength. He displays normal reflexes. No cranial nerve deficit or sensory deficit. Coordination and gait normal.   Skin: Skin is warm and dry. No lesion and no rash noted. He is not diaphoretic. No cyanosis. Nails show no clubbing. Psychiatric: His speech is normal and behavior is normal. Judgment and thought content normal. His mood appears not anxious. His affect is not angry. Cognition and memory are normal. He does not exhibit a depressed mood. Nursing note and vitals reviewed.       DIAGNOSTIC STUDIES & LABORATORY DATA:     I have personally reviewed andinterpreted the results of the following diagnostic testing  CARDIAC

## 2018-11-05 NOTE — PROGRESS NOTES
12- Verified with Dr. Santy Cabrera, patient to restart coumadin tonight - not to bridge with Heparin or Lovenox. Also - ok for transfer to stepdown after pacemaker placement. 1325- Patient transported to cath lab for pacemaker placement with cath lab staff. 1710- Patient returned to  with PACU staff. Patient with left shoulder dressing in place s/p permanent pacemaker placement, RIJ site remains soft, dry, and intact.     1932- Report given to [facility]

## 2018-11-05 NOTE — PROGRESS NOTES
Clinical Pharmacy Note    Margy Wan is a 68 y.o. male for whom pharmacy has been asked to manage warfarin therapy. Reason for Admission: syncope    Consulting Physician: Yue Petit  Warfarin dose prior to admission: 6mg, 7mg, 7mg then repeat cycle  Warfarin indication: AVR  Target INR range: 2.5-3.5   Outpatient warfarin provider: BILLY Camargo    Past Medical History:   Diagnosis Date    CAD (coronary artery disease)     Clotting disorder (HCC)     anemic    Compartment syndrome (HCC)     Right arm    Heart murmur     Hyperlipidemia     Hypertension     Ventricular hypertrophy     left                Recent Labs      11/05/18   0621   INR  1.76*     Recent Labs      11/03/18   0805   HGB  13.5*   HCT  39.1*   PLT  224       Current warfarin drug-drug interactions: allopurinol (hm), aspirin 81 (hm)      Date INR Warfarin Dose   11/5/2018 1.76 12.5 mg                                   No bridging required per cardiology. Daily PT/INR until stable within therapeutic range. Thank you for the consult.      Preston Rodriguez, PharmD  PGY2 Ambulatory Care Resident

## 2018-11-05 NOTE — ANESTHESIA PRE PROCEDURE
Department of Anesthesiology  Preprocedure Note       Name:  Tara Shipman   Age:  68 y.o.  :  1945                                          MRN:  [id number]         Date:  2018      Surgeon: * No surgeons listed *    Procedure: STR PACEMAKER W/ ANESTHESIA    Medications prior to admission:   Prior to Admission medications    Medication Sig Start Date End Date Taking? Authorizing Provider   hydrochlorothiazide (MICROZIDE) 12.5 MG capsule TAKE TWO CAPSULES BY MOUTH IN THE MORNING 10/22/18  Yes Emeka Sims MD   warfarin (COUMADIN) 2 MG tablet AS DIRECTED PER DR EATON  Patient taking differently: 7 mg AS DIRECTED PER DR EATON 10/9/18  Yes Emeka Sims MD   warfarin (COUMADIN) 5 MG tablet AS DIRECTED PER DR EATON  Patient taking differently: 6 mg AS DIRECTED PER DR EATON 10/9/18  Yes Emeka Sims MD   allopurinol (ZYLOPRIM) 300 MG tablet Take 1 tablet by mouth daily 18  Yes Emeka Sims MD   metoprolol tartrate (LOPRESSOR) 50 MG tablet Take 1 tablet by mouth 2 times daily 18  Yes Emeka Sims MD   atorvastatin (LIPITOR) 80 MG tablet Take 1 tablet by mouth daily 18  Yes Emeka Sims MD   lisinopril (PRINIVIL;ZESTRIL) 10 MG tablet Take 1 tablet by mouth 2 times daily 18  Yes Emeka Sims MD   triamcinolone (KENALOG) 0.1 % cream Apply topically as needed Apply topically 2 times daily. Yes Historical Provider, MD   aspirin 81 MG EC tablet Take 81 mg by mouth daily.      Yes Historical Provider, MD       Current medications:    Current Facility-Administered Medications   Medication Dose Route Frequency Provider Last Rate Last Dose    vancomycin (VANCOCIN) 1,500 mg in dextrose 5 % 500 mL IVPB  1,500 mg Intravenous Once Jess Melissa  mL/hr at 18 1318 1,500 mg at 18 1318    warfarin (COUMADIN) daily dosing (placeholder)   Other RX Emmy Jane MD        warfarin (COUMADIN) tablet 12.5 mg  12.5 mg Oral Compartment syndrome ([address]     Right arm    Heart murmur     Hyperlipidemia     Hypertension     Ventricular hypertrophy     left       Past Surgical History:        Procedure Laterality Date    AORTIC VALVE REPLACEMENT      sept 2008    ARM SURGERY Right     Compartment syndrome - may 2010    CARPAL TUNNEL RELEASE Bilateral     CORONARY ARTERY BYPASS GRAFT      sept 2008    DIAGNOSTIC CARDIAC CATH LAB PROCEDURE      ROTATOR CUFF REPAIR      with 3 pins placed        Social History:    Social History   Substance Use Topics    Smoking status: Never Smoker    Smokeless tobacco: Never Used    Alcohol use Yes      Comment: occasionally                                Counseling given: Not Answered      Vital Signs (Current):   Vitals:    11/05/18 1000 11/05/18 1100 11/05/18 1200 11/05/18 1300   BP: 132/67 114/69 (!) 149/68 137/63   Pulse: 89 78 64 63   Resp: 14 14 16 21   Temp:   97.3 °F (36.3 °C)    TempSrc:   Oral    SpO2:   98%    Weight:       Height:                                                  BP Readings from Last 3 Encounters:   11/05/18 137/63   11/05/18 (!) 110/56   10/24/18 (!) 183/78       NPO Status:                                                                                 BMI:   Wt Readings from Last 3 Encounters:   11/03/18 193 lb 2 oz (87.6 kg)   10/24/18 214 lb (97.1 kg)   10/22/18 214 lb (97.1 kg)     Body mass index is 29.36 kg/m².     CBC:   Lab Results   Component Value Date    WBC 8.4 11/03/2018    RBC 4.36 11/03/2018    HGB 13.5 11/03/2018    HCT 39.1 11/03/2018    MCV 89.7 11/03/2018    RDW 13.8 09/30/2017     11/03/2018       CMP:   Lab Results   Component Value Date     11/04/2018    K 4.1 11/04/2018    K 4.2 11/02/2018     11/04/2018    CO2 23 11/04/2018    BUN 11 11/04/2018    CREATININE 0.6 11/04/2018    AGRATIO 1.6 09/30/2017    LABGLOM >90 11/04/2018    GLUCOSE 112 11/04/2018    GLUCOSE 129 09/30/2017    PROT 7.9 11/01/2018    PROT 7.4 06/04/2013

## 2018-11-06 ENCOUNTER — APPOINTMENT (OUTPATIENT)
Dept: GENERAL RADIOLOGY | Age: 73
DRG: 244 | End: 2018-11-06
Payer: MEDICARE

## 2018-11-06 ENCOUNTER — TELEPHONE (OUTPATIENT)
Dept: FAMILY MEDICINE CLINIC | Age: 73
End: 2018-11-06

## 2018-11-06 VITALS
HEART RATE: 60 BPM | WEIGHT: 210.54 LBS | DIASTOLIC BLOOD PRESSURE: 64 MMHG | TEMPERATURE: 97.8 F | BODY MASS INDEX: 31.91 KG/M2 | RESPIRATION RATE: 12 BRPM | HEIGHT: 68 IN | SYSTOLIC BLOOD PRESSURE: 113 MMHG | OXYGEN SATURATION: 93 %

## 2018-11-06 LAB
EKG ATRIAL RATE: 60 BPM
EKG P AXIS: 62 DEGREES
EKG P-R INTERVAL: 166 MS
EKG Q-T INTERVAL: 548 MS
EKG QRS DURATION: 172 MS
EKG QTC CALCULATION (BAZETT): 548 MS
EKG R AXIS: -87 DEGREES
EKG T AXIS: 32 DEGREES
EKG VENTRICULAR RATE: 60 BPM
INR BLD: 1.53 (ref 0.85–1.13)

## 2018-11-06 PROCEDURE — 6370000000 HC RX 637 (ALT 250 FOR IP): Performed by: NURSE PRACTITIONER

## 2018-11-06 PROCEDURE — 93005 ELECTROCARDIOGRAM TRACING: CPT | Performed by: INTERNAL MEDICINE

## 2018-11-06 PROCEDURE — 2580000003 HC RX 258: Performed by: INTERNAL MEDICINE

## 2018-11-06 PROCEDURE — 99024 POSTOP FOLLOW-UP VISIT: CPT | Performed by: INTERNAL MEDICINE

## 2018-11-06 PROCEDURE — 36415 COLL VENOUS BLD VENIPUNCTURE: CPT

## 2018-11-06 PROCEDURE — 71046 X-RAY EXAM CHEST 2 VIEWS: CPT

## 2018-11-06 PROCEDURE — 6370000000 HC RX 637 (ALT 250 FOR IP): Performed by: INTERNAL MEDICINE

## 2018-11-06 PROCEDURE — 6360000002 HC RX W HCPCS: Performed by: INTERNAL MEDICINE

## 2018-11-06 PROCEDURE — 93010 ELECTROCARDIOGRAM REPORT: CPT | Performed by: NUCLEAR MEDICINE

## 2018-11-06 PROCEDURE — 99232 SBSQ HOSP IP/OBS MODERATE 35: CPT | Performed by: INTERNAL MEDICINE

## 2018-11-06 PROCEDURE — 85610 PROTHROMBIN TIME: CPT

## 2018-11-06 RX ADMIN — Medication 2 G: at 05:04

## 2018-11-06 RX ADMIN — HYDROCODONE BITARTRATE AND ACETAMINOPHEN 1 TABLET: 5; 325 TABLET ORAL at 07:03

## 2018-11-06 RX ADMIN — ASPIRIN 81 MG: 81 TABLET, COATED ORAL at 08:13

## 2018-11-06 RX ADMIN — ALLOPURINOL 300 MG: 300 TABLET ORAL at 08:13

## 2018-11-06 RX ADMIN — LISINOPRIL 10 MG: 10 TABLET ORAL at 08:13

## 2018-11-06 RX ADMIN — Medication 10 ML: at 08:13

## 2018-11-06 ASSESSMENT — PAIN SCALES - GENERAL
PAINLEVEL_OUTOF10: 5
PAINLEVEL_OUTOF10: 5
PAINLEVEL_OUTOF10: 3
PAINLEVEL_OUTOF10: 2

## 2018-11-06 ASSESSMENT — PAIN DESCRIPTION - LOCATION: LOCATION: CHEST

## 2018-11-06 ASSESSMENT — PAIN DESCRIPTION - PAIN TYPE: TYPE: SURGICAL PAIN

## 2018-11-06 ASSESSMENT — PAIN DESCRIPTION - ORIENTATION: ORIENTATION: LEFT;UPPER

## 2018-11-06 NOTE — PLAN OF CARE
Problem: Pain:  Goal: Control of acute pain  Control of acute pain   Outcome: Met This Shift  Pain controlled using PRN pain medicine. Problem: Risk for Impaired Skin Integrity  Goal: Tissue integrity - skin and mucous membranes  Structural intactness and normal physiological function of skin and  mucous membranes. Outcome: Met This Shift  No pressure ulcer present. Surgical incision dressings dry and intact. Problem: Discharge Planning:  Goal: Participates in care planning  Participates in care planning   Outcome: Met This Shift  Discharge instructions given to patient and wife. They verbalize understanding. Goal: Discharged to appropriate level of care  Discharged to appropriate level of care   Outcome: Met This Shift      Comments: Care plan reviewed with patient and wife. Patient and wife verbalize understanding of the plan of care and contribute to goal setting.

## 2018-11-06 NOTE — OP NOTE
wire  was removed under fluoroscopy and backup pacing was continued through  the newly inserted RV apical pacemaker lead. The short 7-Bengali  peel-away hemostatic sheath was removed without any difficulties. A  10-Bengali peel-away hemostatic sheath was then inserted over the most  lateral short J-tipped guidewire into the left subclavian vein under  fluoroscopic assistance. The short J-tipped guidewire and the dilator  were removed. A [facility] large curve delivery sheath was then  advanced over a guidewire into the right atrium. Then placing the  guidewire into the right ventricle the sheath was guided into the right  ventricular inflow tract. The sheath was then and along with the  guidewire withdrawn with counterclockwise torque from the RV inflow  tract and then carefully advanced into the coronary sinus. The coronary  sinus was easily identified based upon significant calcification of the  annulus of the mitral valve. The [employer] guidewire was then advanced and  blindly I was able to find a posterolateral branch. The guidewire was  withdrawn and then I performed venogram with half contrast and half  saline solution. This branch was a mid takeoff branch that was pretty  small, but extended with uniform diameter out to the lateral wall of the  left ventricle. The sheath was then flushed and then I tried advancing  a [employer] guidewire and [facility] quadripolar LV lead  into this branch was unsuccessful, and therefore I had to remove it. I  then advanced a Eleni Mitchella 1397, model 1258T - 86 cm,  serial [id number] bipolar lead into this branch over a BMW guidewire. Pacing and sensing thresholds were performed and were good and pacing at  10 volts did not produce any phrenic nerve stimulation. The  interventional guidewire was withdrawn from the lead and then the  finishing stylet was inserted partially into the lead.   The delivery  sheath was cut away and then

## 2018-11-06 NOTE — PROGRESS NOTES
1045  Discharge instructions reviewed with patient and wife. They verbalize understanding. Patient changes into own clothes. Walks in room without difficulty. Patient discharged per wheelchair to personal vehicle. Wife present.

## 2018-11-06 NOTE — ANESTHESIA POSTPROCEDURE EVALUATION
Department of Anesthesiology  Postprocedure Note    Patient: Lars Escalera  MRN: [id number]  YOB: 1945  Date of evaluation: 11/5/2018  Time:  7:18 PM     Procedure Summary     Date:  11/05/18 Room / Location:  [address] [place] [address] CATH LAB    Anesthesia Start:  [phone] Anesthesia Stop:  1636    Procedure:  STR PACEMAKER W/ ANESTHESIA Diagnosis:      Scheduled Providers:  Alina Barker DO Responsible Provider:  Alina Barker DO    Anesthesia Type:  general ASA Status:  3          Anesthesia Type: No value filed. Bianca Phase I: Bianca Score: 8    Bianca Phase II:      Last vitals: Reviewed and per EMR flowsheets. Anesthesia Post Evaluation    Patient location during evaluation: PACU  Patient participation: complete - patient participated  Level of consciousness: awake and alert  Airway patency: patent  Nausea & Vomiting: no nausea and no vomiting  Complications: no  Cardiovascular status: hemodynamically stable  Respiratory status: acceptable  Hydration status: euvolemic      [address] Lev[address], Susan. \Hospital Has a New Name and Outlook.\""  POST-ANESTHESIA NOTE       Name:  Lars Escalera                                         Age:  68 y.o.   MRN:  [id number]      Last Vitals:  /81   Pulse 80   Temp 98 °F (36.7 °C) (Oral)   Resp 11   Ht 5' 8\" (1.727 m)   Wt 193 lb 2 oz (87.6 kg)   SpO2 93%   BMI 29.36 kg/m²   Patient Vitals for the past 4 hrs:   BP Temp Temp src Pulse Resp SpO2   11/05/18 1900 104/81 - - 80 11 93 %   11/05/18 1845 138/64 - - 69 14 93 %   11/05/18 1830 (!) 142/65 - - 70 14 -   11/05/18 1815 132/70 - - 65 13 95 %   11/05/18 1800 (!) 171/68 - - 62 13 96 %   11/05/18 1745 (!) 152/69 - - 62 14 97 %   11/05/18 1730 (!) 144/67 - - 60 12 96 %   11/05/18 1715 (!) 162/63 - - 60 12 97 %   11/05/18 1711 (!) 153/68 98 °F (36.7 °C) Oral 61 12 97 %   11/05/18 1705 - - - 60 10 97 %   11/05/18 1700 (!) 142/67 - - 60 12 96 %   11/05/18 1655 (!) 140/65 - - 61 8 96 %   11/05/18 1650 (!) 143/67 - - 61 9 96 %   11/05/18 1645 (!) 147/69 - - 66 12 96 %   11/05/18 1640 (!) 148/61 - - 67 (!) 7 97 %   11/05/18 1635 (!) 164/73 - - 74 14 98 %   11/05/18 1630 (!) 165/73 97.7 °F (36.5 °C) Temporal 75 11 98 %       Level of Consciousness:  Awake    Respiratory:  Stable    Oxygen Saturation:  Stable    Cardiovascular:  Stable    Hydration:  Adequate    PONV:  Stable    Post-op Pain:  Adequate analgesia    Post-op Assessment:  No apparent anesthetic complications    Additional Follow-Up / Treatment / Comment:  Reyes Fields MD  November 5, 2018   7:18 PM

## 2018-11-06 NOTE — DISCHARGE SUMMARY
ELECTROPHYSIOLOGY  IP CONSULT TO PHARMACY    Significant Diagnostic Studies:   ECHO/ temporary TVP, Insertion of a permanent biventricular pacemaker    Disposition:  Discharged home in improved condition    Discharge Instructions/Follow-up:  See separate instructions. Activity and Diet: as directed per discharge instructions. Labs: For convenience andcontinuity at follow-up the following most recent labs are provided:  CBC:  Lab Results   Component Value Date    WBC 8.4 11/03/2018    HGB 13.5 11/03/2018    HCT 39.1 11/03/2018     11/03/2018       Renal:    Lab Results   Component Value Date     11/04/2018    K 4.1 11/04/2018    K 4.2 11/02/2018     11/04/2018    CO2 23 11/04/2018    BUN 11 11/04/2018    CREATININE 0.6 11/04/2018    CALCIUM 8.7 11/04/2018    PHOS 3.0 09/30/2017       Discharge Medications:   Current Discharge Medication List           Details   hydrochlorothiazide (MICROZIDE) 12.5 MG capsule TAKE TWO CAPSULES BY MOUTH IN THE MORNING  Qty: 90 capsule, Refills: 1    Associated Diagnoses: Essential hypertension      !! warfarin (COUMADIN) 2 MG tablet AS DIRECTED PER DR EATON  Qty: 270 tablet, Refills: 1    Associated Diagnoses: ASCVD (arteriosclerotic cardiovascular disease)      ! ! warfarin (COUMADIN) 5 MG tablet AS DIRECTED PER DR EATON  Qty: 90 tablet, Refills: 1    Associated Diagnoses: ASCVD (arteriosclerotic cardiovascular disease)      allopurinol (ZYLOPRIM) 300 MG tablet Take 1 tablet by mouth daily  Qty: 90 tablet, Refills: 1      metoprolol tartrate (LOPRESSOR) 50 MG tablet Take 1 tablet by mouth 2 times daily  Qty: 180 tablet, Refills: 1    Associated Diagnoses: Essential hypertension      atorvastatin (LIPITOR) 80 MG tablet Take 1 tablet by mouth daily  Qty: 90 tablet, Refills: 1    Associated Diagnoses: Pure hypercholesterolemia      lisinopril (PRINIVIL;ZESTRIL) 10 MG tablet Take 1 tablet by mouth 2 times daily  Qty: 180 tablet, Refills: 1    Associated Diagnoses:

## 2018-11-06 NOTE — PROGRESS NOTES
CLINICAL PHARMACY: DISCHARGE MED RECONCILIATION/REVIEW    [facility] ([facility] Select Patient?: Yes  Total # of Interventions Recommended: 1 -   - New Order #: 1  Total # Interventions Accepted: 0  Intervention Severity:   - Level 1 Intervention Present?: No   - Level 2 #: 0   - Level 3 #: 1   Time Spent (min): 15    Allyn Rich PharmD, BCPS  11/6/2018 9:54 AM

## 2018-11-06 NOTE — PROGRESS NOTES
0800  Patient resting calmly in chair. States some discomfort to pacemaker surgical site. Left arm sling on. Ice pack on left upper chest area. 5446  Dr. Anyi Barreto in to see patient. Dr. Anyi Barreto reviews discharge instructions with patient. Patient told he may remove sling when he gets home and only has to wear sling at night for three weeks. Keep pacemaker surgical site dry and cover for 5 days. 0930  Pacemaker Rep Javier Cruz does pacer interrogation and givens home instructions. Wife present.

## 2018-11-06 NOTE — PROGRESS NOTES
aspirin  81 mg Oral Daily    atorvastatin  80 mg Oral Daily    sodium chloride flush  10 mL Intravenous 2 times per day       acetaminophen 650 mg Q4H PRN   HYDROcodone 5 mg - acetaminophen 1 tablet Q4H PRN   Or     HYDROcodone 5 mg - acetaminophen 2 tablet Q4H PRN   HYDROmorphone 0.25 mg Q3H PRN   Or     HYDROmorphone 0.5 mg Q3H PRN   magnesium hydroxide 30 mL Daily PRN   ondansetron 4 mg Q6H PRN   glucose 15 g PRN   dextrose 12.5 g PRN   glucagon (rDNA) 1 mg PRN   dextrose 100 mL/hr PRN   magnesium sulfate 1 g PRN   potassium chloride 40 mEq PRN   Or     potassium chloride 40 mEq PRN   Or     potassium chloride 10 mEq PRN   sodium chloride flush 10 mL PRN        Vital Signs: T: 97.8: P: 60: RR: 12: B/P: 113/64: FiO2: Room air: O2 Sat: 93%:   General:   Overweight white male in no distress  HEENT:  normocephalic and atraumatic. No scleral icterus. Neck: supple. No JVD. Lungs: clear to auscultation. No retractions  Cardiac: RRR  Abdomen: soft. Nontender. Extremities:  No clubbing, cyanosis, or edema x 4. Vasculature: capillary refill < 3 seconds. Skin:  warm and dry. Psych:  Alert and oriented x3. Affect appropriate  Lymph:  No supraclavicular adenopathy. Neurologic:  No focal deficit.     Data: (All radiographs, tracings, PFTs, and imaging are personally viewed and interpreted unless otherwise noted). · Telemetry shows Paced rhythm. · Echocardiogram report 11/3/18: Ejection fraction 45%. Status post mechanical aortic valve.     Electronically signed by Daiana Fuentes.  Senait Fernandez M.D.

## 2018-11-06 NOTE — PLAN OF CARE
Problem: Pain:  Goal: Pain level will decrease  Pain level will decrease   Outcome: Ongoing  Patient complaining of pain, treated with tylenol and norco as needed. Patient satisfied with pain intervention  Goal: Control of acute pain  Control of acute pain   Outcome: Ongoing  Patient pain treated with tylenol and norco as needed  Goal: Control of chronic pain  Control of chronic pain   Outcome: Ongoing  Patient only complains of acute pain related to pacemaker placement      Problem: Falls - Risk of:  Goal: Will remain free from falls  Will remain free from falls   Outcome: Met This Shift  No falls this shift. Patient educated on call light use, call light in reach, bed in lowest position, bed rails up x4, hourly rounding completed      Goal: Absence of physical injury  Absence of physical injury   Outcome: Met This Shift  Call light in reach, bed lowest position, wheels locked, and correct ID band in place. Environment remains clear of fall hazards. Reviewed safety measures with patient/family. Problem: Risk for Impaired Skin Integrity  Goal: Tissue integrity - skin and mucous membranes  Structural intactness and normal physiological function of skin and  mucous membranes. Outcome: Met This Shift  No signs of new skin breakdown with each assessment. Skin remains warm, dry, intact. Mucous membranes pink & moist. Patient is able to turn self. Problem: Discharge Planning:  Goal: Participates in care planning  Participates in care planning   Outcome: Met This Shift  Patient actively participates in care planning  Goal: Discharged to appropriate level of care  Discharged to appropriate level of care   Outcome: Met This Shift  Patient with stepdown transfer orders, no beds available at this time.  Physician discussed possible discharge home tomorrow with patient    Problem: Cardiac Output - Decreased:  Goal: Hemodynamic stability will improve  Hemodynamic stability will improve   Outcome: Met This Shift  Patient vital signs within normal limits    Comments: Care plan reviewed with patient and wife. Patient and wife verbalize understanding of the plan of care and contribute to goal setting.

## 2018-11-06 NOTE — DISCHARGE INSTR - PHARMACY
Clinical Pharmacy Note                                               Warfarin Discharge Recommendations        Pt discharged from [facility] today after admission for syncope     INR today:      Recent Labs      11/06/18   0402   INR  1.53*      Interacting medications at discharge: allopurinol, aspirin     INR goal during admission: 2.5-3.5     Recommendations for discharge:   Date Warfarin Dose   11/6/2018 12.5 mg   11/7/2018 INR check         Provider dosing warfarin: Dr. Garrett Mortensen INR:  11/7/18 with results to Dr. Aidan Jennings

## 2018-11-07 ENCOUNTER — CARE COORDINATION (OUTPATIENT)
Dept: CASE MANAGEMENT | Age: 73
End: 2018-11-07

## 2018-11-07 DIAGNOSIS — I49.9 CARDIAC ARRHYTHMIA, UNSPECIFIED CARDIAC ARRHYTHMIA TYPE: Primary | ICD-10-CM

## 2018-11-07 NOTE — CARE COORDINATION
Appointments  Date Time Provider Kassie Lin   11/13/2018 2:45 PM Yadira Vsaquez MD [facility] [place]   11/14/2018 8:30 AM SCHEDULE, SRPS PACER NURSE [facility] [place]   2/4/2019 4:15 PM MD MISHEL Louis [facility] [place]   8/21/2019 9:30 AM Zoheir Mendel Lam, MD [address] [facility] - Yesenia Charlton RN  Care Transition Coordinator  [phone]

## 2018-11-12 ENCOUNTER — CARE COORDINATION (OUTPATIENT)
Dept: CASE MANAGEMENT | Age: 73
End: 2018-11-12

## 2018-11-12 ENCOUNTER — HOSPITAL ENCOUNTER (OUTPATIENT)
Age: 73
Discharge: HOME | End: 2018-11-12
Payer: MEDICARE

## 2018-11-12 LAB — INR BLD: 2.57 (ref 0.85–1.13)

## 2018-11-12 PROCEDURE — 85610 PROTHROMBIN TIME: CPT

## 2018-11-12 PROCEDURE — 36415 COLL VENOUS BLD VENIPUNCTURE: CPT

## 2018-11-12 NOTE — CARE COORDINATION
Left message to call transition care coordinator from [facility] - DOUG OSORIO @ [phone].

## 2018-11-13 ENCOUNTER — OFFICE VISIT (OUTPATIENT)
Dept: FAMILY MEDICINE CLINIC | Age: 73
End: 2018-11-13
Payer: MEDICARE

## 2018-11-13 VITALS
WEIGHT: 209 LBS | DIASTOLIC BLOOD PRESSURE: 66 MMHG | SYSTOLIC BLOOD PRESSURE: 126 MMHG | BODY MASS INDEX: 31.67 KG/M2 | HEART RATE: 62 BPM | HEIGHT: 68 IN

## 2018-11-13 DIAGNOSIS — E78.00 PURE HYPERCHOLESTEROLEMIA: ICD-10-CM

## 2018-11-13 DIAGNOSIS — R55 SYNCOPE AND COLLAPSE: ICD-10-CM

## 2018-11-13 DIAGNOSIS — Z23 NEED FOR INFLUENZA VACCINATION: ICD-10-CM

## 2018-11-13 DIAGNOSIS — I45.9 HEART BLOCK: ICD-10-CM

## 2018-11-13 DIAGNOSIS — I49.9 CARDIAC ARRHYTHMIA, UNSPECIFIED CARDIAC ARRHYTHMIA TYPE: ICD-10-CM

## 2018-11-13 DIAGNOSIS — Z09 HOSPITAL DISCHARGE FOLLOW-UP: Primary | ICD-10-CM

## 2018-11-13 DIAGNOSIS — I10 ESSENTIAL HYPERTENSION: ICD-10-CM

## 2018-11-13 DIAGNOSIS — I95.1 ORTHOSTATIC HYPOTENSION: ICD-10-CM

## 2018-11-13 DIAGNOSIS — Z79.01 WARFARIN ANTICOAGULATION: ICD-10-CM

## 2018-11-13 PROCEDURE — G0008 ADMIN INFLUENZA VIRUS VAC: HCPCS | Performed by: FAMILY MEDICINE

## 2018-11-13 PROCEDURE — 99495 TRANSJ CARE MGMT MOD F2F 14D: CPT | Performed by: FAMILY MEDICINE

## 2018-11-13 PROCEDURE — 90662 IIV NO PRSV INCREASED AG IM: CPT | Performed by: FAMILY MEDICINE

## 2018-11-13 PROCEDURE — 1111F DSCHRG MED/CURRENT MED MERGE: CPT | Performed by: FAMILY MEDICINE

## 2018-11-13 RX ORDER — ATORVASTATIN CALCIUM 80 MG/1
80 TABLET, FILM COATED ORAL DAILY
Qty: 90 TABLET | Refills: 1 | Status: SHIPPED | OUTPATIENT
Start: 2018-11-13 | End: 2019-05-08 | Stop reason: SDUPTHER

## 2018-11-13 ASSESSMENT — ENCOUNTER SYMPTOMS
EYE DISCHARGE: 0
VOMITING: 0
CONSTIPATION: 0
RHINORRHEA: 0
COLOR CHANGE: 0
DIARRHEA: 1
SHORTNESS OF BREATH: 0
EYE REDNESS: 0
CHEST TIGHTNESS: 0
NAUSEA: 0

## 2018-11-13 NOTE — PROGRESS NOTES
After obtaining consent, and per orders of Nava Brambila MD, Immunization(s) given during visit:    Immunizations     Name Date Dose Route    Influenza, High Dose (Fluzone 65 yrs and older) 11/13/2018 0.5 mL Intramuscular    Site: Deltoid- Right    Lot: [id number]    NDC: 09761-923-14
tablet by mouth daily             aspirin 81 MG EC tablet  Take 81 mg by mouth daily. atorvastatin (LIPITOR) 80 MG tablet  Take 1 tablet by mouth daily             hydrochlorothiazide (MICROZIDE) 12.5 MG capsule  TAKE TWO CAPSULES BY MOUTH IN THE MORNING             lisinopril (PRINIVIL;ZESTRIL) 10 MG tablet  Take 1 tablet by mouth 2 times daily             metoprolol tartrate (LOPRESSOR) 50 MG tablet  Take 1 tablet by mouth 2 times daily             triamcinolone (KENALOG) 0.1 % cream  Apply topically as needed Apply topically 2 times daily. warfarin (COUMADIN) 2 MG tablet  AS DIRECTED PER DR EATON             warfarin (COUMADIN) 5 MG tablet  AS DIRECTED PER DR EATON                 Medications marked \"taking\" at this time  Outpatient Prescriptions Marked as Taking for the 11/13/18 encounter (Office Visit) with Mohini Funes MD   Medication Sig Dispense Refill    atorvastatin (LIPITOR) 80 MG tablet Take 1 tablet by mouth daily 90 tablet 1    hydrochlorothiazide (MICROZIDE) 12.5 MG capsule TAKE TWO CAPSULES BY MOUTH IN THE MORNING (Patient taking differently: 12.5 mg 2 times daily TAKE TWO CAPSULES BY MOUTH IN THE MORNING) 90 capsule 1    warfarin (COUMADIN) 2 MG tablet AS DIRECTED PER DR EATON (Patient taking differently: 7 mg AS DIRECTED PER DR EATON) 270 tablet 1    warfarin (COUMADIN) 5 MG tablet AS DIRECTED PER DR EATON (Patient taking differently: 6 mg AS DIRECTED PER DR EATON) 90 tablet 1    allopurinol (ZYLOPRIM) 300 MG tablet Take 1 tablet by mouth daily 90 tablet 1    metoprolol tartrate (LOPRESSOR) 50 MG tablet Take 1 tablet by mouth 2 times daily 180 tablet 1    lisinopril (PRINIVIL;ZESTRIL) 10 MG tablet Take 1 tablet by mouth 2 times daily 180 tablet 1    triamcinolone (KENALOG) 0.1 % cream Apply topically as needed Apply topically 2 times daily.  aspirin 81 MG EC tablet Take 81 mg by mouth daily.           Medications patient taking as of now reconciled against

## 2018-11-14 ENCOUNTER — NURSE ONLY (OUTPATIENT)
Dept: CARDIOLOGY CLINIC | Age: 73
End: 2018-11-14
Payer: MEDICARE

## 2018-11-14 ENCOUNTER — CARE COORDINATION (OUTPATIENT)
Dept: CASE MANAGEMENT | Age: 73
End: 2018-11-14

## 2018-11-14 DIAGNOSIS — Z95.0 BIVENTRICULAR CARDIAC PACEMAKER IN SITU: ICD-10-CM

## 2018-11-14 PROCEDURE — 93281 PM DEVICE PROGR EVAL MULTI: CPT | Performed by: INTERNAL MEDICINE

## 2018-11-14 NOTE — CARE COORDINATION
[facility] Transitions Follow Up Call    2018    Patient: Nino Oh  Patient : 1945   MRN: [id number]  Reason for Admission: There are no discharge diagnoses documented for the most recent discharge. Discharge Date: 18 RARS: Readmission Risk Score: 15       Attempted to reach patient for sub CTC call, no answer, left message to return call, contact information provided. Care Transitions Subsequent and Final Call    Subsequent and Final Calls  Care Transitions Interventions  Other Interventions:             Follow Up  Future Appointments  Date Time Provider Kassie Lin   2019 4:15 PM Erasmo Moreno MD [facility] [place]   2019 9:00 AM SCHEDULE, SRPS PACER NURSE [facility] PACER [facility] - [place]   2019 9:30 AM Lis Dave  [place] [facility] - [address] [phone]

## 2018-11-15 ENCOUNTER — CARE COORDINATION (OUTPATIENT)
Dept: CASE MANAGEMENT | Age: 73
End: 2018-11-15

## 2018-11-15 NOTE — CARE COORDINATION
[facility] Transitions Follow Up Call    11/15/2018    Patient: Franki Owen  Patient : 1945   MRN: [id number]  Reason for Admission: There are no discharge diagnoses documented for the most recent discharge. Discharge Date: 18 RARS: Readmission Risk Score: 15       Spoke with: [address] who called back following third missed call. Identified self/rold. Patient states he is doing \"fine\". Patient reports no pain and states pacemaker incision is healing \"well\". Patient states [facility] and everything is going well. Patient states he is not as tired as before and has been moving around more. Patient states he no longer has loose bowel movements. Patient states the blister on the right abdomen from hospital tape is now scabbed over. Patient denies pain, SOB, and palpitations. Contact information for CTC provided. No additional needs or concerns voiced at this time. Care Transitions Subsequent and Final Call    Subsequent and Final Calls  Do you have any ongoing symptoms?:  No  Have your medications changed?:  No  Do you have any questions related to your medications?:  No  Do you currently have any active services?:  No  Do you have any needs or concerns that I can assist you with?:  No  Identified Barriers:  None  Care Transitions Interventions  No Identified Needs  Other Interventions:             Follow Up  Future Appointments  Date Time Provider Kassie Lin   2019 4:15 PM MD MISHEL Monteiro [facility] [place]   2019 9:00 AM SCHEDULE, HARLEEN PACER NURSE [facility] [place]   2019 9:30 AM Wayne Ramirez MD [address] [place] [facility] [place] [address]

## 2018-11-19 ENCOUNTER — HOSPITAL ENCOUNTER (OUTPATIENT)
Age: 73
Discharge: HOME | End: 2018-11-19
Payer: MEDICARE

## 2018-11-19 LAB — INR BLD: 3.83 (ref 0.85–1.13)

## 2018-11-19 PROCEDURE — 85610 PROTHROMBIN TIME: CPT

## 2018-11-19 PROCEDURE — 36415 COLL VENOUS BLD VENIPUNCTURE: CPT

## 2019-01-16 ENCOUNTER — TELEPHONE (OUTPATIENT)
Dept: CARDIOLOGY CLINIC | Age: 74
End: 2019-01-16

## 2019-01-16 ENCOUNTER — NURSE ONLY (OUTPATIENT)
Dept: CARDIOLOGY CLINIC | Age: 74
End: 2019-01-16
Payer: MEDICARE

## 2019-01-16 DIAGNOSIS — Z95.0 BIVENTRICULAR CARDIAC PACEMAKER IN SITU: Primary | ICD-10-CM

## 2019-01-16 PROCEDURE — 93288 INTERROG EVL PM/LDLS PM IP: CPT | Performed by: INTERNAL MEDICINE

## 2019-01-18 ENCOUNTER — NURSE ONLY (OUTPATIENT)
Dept: CARDIOLOGY CLINIC | Age: 74
End: 2019-01-18
Payer: MEDICARE

## 2019-01-18 DIAGNOSIS — Z95.0 BIVENTRICULAR CARDIAC PACEMAKER IN SITU: Primary | ICD-10-CM

## 2019-01-18 PROCEDURE — 93288 INTERROG EVL PM/LDLS PM IP: CPT | Performed by: INTERNAL MEDICINE

## 2019-02-04 ENCOUNTER — HOSPITAL ENCOUNTER (OUTPATIENT)
Age: 74
Discharge: HOME | End: 2019-02-04
Payer: MEDICARE

## 2019-02-04 ENCOUNTER — OFFICE VISIT (OUTPATIENT)
Dept: FAMILY MEDICINE CLINIC | Age: 74
End: 2019-02-04
Payer: MEDICARE

## 2019-02-04 ENCOUNTER — HOSPITAL ENCOUNTER (OUTPATIENT)
Dept: GENERAL RADIOLOGY | Age: 74
Discharge: HOME | End: 2019-02-04
Payer: MEDICARE

## 2019-02-04 VITALS
HEART RATE: 74 BPM | BODY MASS INDEX: 32.43 KG/M2 | WEIGHT: 214 LBS | SYSTOLIC BLOOD PRESSURE: 126 MMHG | DIASTOLIC BLOOD PRESSURE: 80 MMHG | HEIGHT: 68 IN

## 2019-02-04 DIAGNOSIS — I49.9 CARDIAC ARRHYTHMIA, UNSPECIFIED CARDIAC ARRHYTHMIA TYPE: ICD-10-CM

## 2019-02-04 DIAGNOSIS — I10 ESSENTIAL HYPERTENSION: Primary | ICD-10-CM

## 2019-02-04 DIAGNOSIS — M25.522 LEFT ELBOW PAIN: ICD-10-CM

## 2019-02-04 DIAGNOSIS — Z12.11 SCREENING FOR COLON CANCER: ICD-10-CM

## 2019-02-04 DIAGNOSIS — E78.00 PURE HYPERCHOLESTEROLEMIA: ICD-10-CM

## 2019-02-04 DIAGNOSIS — I25.10 ASCVD (ARTERIOSCLEROTIC CARDIOVASCULAR DISEASE): ICD-10-CM

## 2019-02-04 DIAGNOSIS — Z23 NEED FOR VACCINATION FOR STREP PNEUMONIAE: ICD-10-CM

## 2019-02-04 LAB
CONTROL: PRESENT
HEMOCCULT STL QL: NEGATIVE

## 2019-02-04 PROCEDURE — 1123F ACP DISCUSS/DSCN MKR DOCD: CPT | Performed by: FAMILY MEDICINE

## 2019-02-04 PROCEDURE — 3017F COLORECTAL CA SCREEN DOC REV: CPT | Performed by: FAMILY MEDICINE

## 2019-02-04 PROCEDURE — 1101F PT FALLS ASSESS-DOCD LE1/YR: CPT | Performed by: FAMILY MEDICINE

## 2019-02-04 PROCEDURE — G8598 ASA/ANTIPLAT THER USED: HCPCS | Performed by: FAMILY MEDICINE

## 2019-02-04 PROCEDURE — 99214 OFFICE O/P EST MOD 30 MIN: CPT | Performed by: FAMILY MEDICINE

## 2019-02-04 PROCEDURE — 82274 ASSAY TEST FOR BLOOD FECAL: CPT | Performed by: FAMILY MEDICINE

## 2019-02-04 PROCEDURE — G8427 DOCREV CUR MEDS BY ELIG CLIN: HCPCS | Performed by: FAMILY MEDICINE

## 2019-02-04 PROCEDURE — G8482 FLU IMMUNIZE ORDER/ADMIN: HCPCS | Performed by: FAMILY MEDICINE

## 2019-02-04 PROCEDURE — G8417 CALC BMI ABV UP PARAM F/U: HCPCS | Performed by: FAMILY MEDICINE

## 2019-02-04 PROCEDURE — G0009 ADMIN PNEUMOCOCCAL VACCINE: HCPCS | Performed by: FAMILY MEDICINE

## 2019-02-04 PROCEDURE — 73080 X-RAY EXAM OF ELBOW: CPT

## 2019-02-04 PROCEDURE — 1036F TOBACCO NON-USER: CPT | Performed by: FAMILY MEDICINE

## 2019-02-04 PROCEDURE — 90732 PPSV23 VACC 2 YRS+ SUBQ/IM: CPT | Performed by: FAMILY MEDICINE

## 2019-02-04 PROCEDURE — 4040F PNEUMOC VAC/ADMIN/RCVD: CPT | Performed by: FAMILY MEDICINE

## 2019-02-04 RX ORDER — WARFARIN SODIUM 5 MG/1
TABLET ORAL
Qty: 90 TABLET | Refills: 1 | Status: SHIPPED | OUTPATIENT
Start: 2019-02-04 | End: 2019-07-30 | Stop reason: SDUPTHER

## 2019-02-04 RX ORDER — HYDROCHLOROTHIAZIDE 12.5 MG/1
12.5 CAPSULE, GELATIN COATED ORAL 2 TIMES DAILY
Qty: 180 CAPSULE | Refills: 1 | Status: CANCELLED | OUTPATIENT
Start: 2019-02-04

## 2019-02-04 RX ORDER — WARFARIN SODIUM 2 MG/1
TABLET ORAL
Qty: 270 TABLET | Refills: 1 | Status: SHIPPED | OUTPATIENT
Start: 2019-02-04 | End: 2019-07-30 | Stop reason: SDUPTHER

## 2019-02-04 RX ORDER — LISINOPRIL 10 MG/1
10 TABLET ORAL 2 TIMES DAILY
Qty: 180 TABLET | Refills: 1 | Status: SHIPPED | OUTPATIENT
Start: 2019-02-04 | End: 2019-03-07 | Stop reason: SDUPTHER

## 2019-02-04 RX ORDER — METOPROLOL TARTRATE 50 MG/1
50 TABLET, FILM COATED ORAL 2 TIMES DAILY
Qty: 180 TABLET | Refills: 1 | Status: SHIPPED | OUTPATIENT
Start: 2019-02-04 | End: 2019-07-30 | Stop reason: SDUPTHER

## 2019-02-04 RX ORDER — ALLOPURINOL 300 MG/1
300 TABLET ORAL DAILY
Qty: 90 TABLET | Refills: 1 | Status: SHIPPED | OUTPATIENT
Start: 2019-02-04 | End: 2019-07-30 | Stop reason: SDUPTHER

## 2019-02-04 RX ORDER — HYDROCHLOROTHIAZIDE 12.5 MG/1
12.5 TABLET ORAL DAILY
Qty: 90 TABLET | Refills: 1 | Status: SHIPPED | OUTPATIENT
Start: 2019-02-04 | End: 2019-03-21

## 2019-02-04 ASSESSMENT — ENCOUNTER SYMPTOMS
SHORTNESS OF BREATH: 1
DIARRHEA: 0
CHEST TIGHTNESS: 0
EYE REDNESS: 0
BLOOD IN STOOL: 0
VOMITING: 0
COLOR CHANGE: 0
EYE DISCHARGE: 0
NAUSEA: 0

## 2019-02-05 ENCOUNTER — TELEPHONE (OUTPATIENT)
Dept: FAMILY MEDICINE CLINIC | Age: 74
End: 2019-02-05

## 2019-03-07 DIAGNOSIS — I10 ESSENTIAL HYPERTENSION: ICD-10-CM

## 2019-03-07 RX ORDER — LISINOPRIL 10 MG/1
10 TABLET ORAL 2 TIMES DAILY
Qty: 180 TABLET | Refills: 1 | OUTPATIENT
Start: 2019-03-07

## 2019-03-07 RX ORDER — LISINOPRIL 10 MG/1
10 TABLET ORAL 2 TIMES DAILY
Qty: 180 TABLET | Refills: 1 | Status: SHIPPED | OUTPATIENT
Start: 2019-03-07 | End: 2019-07-30

## 2019-03-20 ENCOUNTER — TELEPHONE (OUTPATIENT)
Dept: CARDIOLOGY CLINIC | Age: 74
End: 2019-03-20

## 2019-03-21 ENCOUNTER — OFFICE VISIT (OUTPATIENT)
Dept: CARDIOLOGY CLINIC | Age: 74
End: 2019-03-21
Payer: MEDICARE

## 2019-03-21 VITALS
WEIGHT: 222 LBS | BODY MASS INDEX: 33.65 KG/M2 | SYSTOLIC BLOOD PRESSURE: 131 MMHG | HEIGHT: 68 IN | DIASTOLIC BLOOD PRESSURE: 74 MMHG | HEART RATE: 60 BPM

## 2019-03-21 DIAGNOSIS — I25.10 ASHD (ARTERIOSCLEROTIC HEART DISEASE): ICD-10-CM

## 2019-03-21 DIAGNOSIS — R07.9 CHEST PAIN, UNSPECIFIED TYPE: Primary | ICD-10-CM

## 2019-03-21 DIAGNOSIS — Z95.0 BIVENTRICULAR CARDIAC PACEMAKER IN SITU: ICD-10-CM

## 2019-03-21 DIAGNOSIS — Z95.2 S/P AVR: ICD-10-CM

## 2019-03-21 DIAGNOSIS — I10 ESSENTIAL HYPERTENSION: ICD-10-CM

## 2019-03-21 DIAGNOSIS — R06.02 SOB (SHORTNESS OF BREATH): ICD-10-CM

## 2019-03-21 DIAGNOSIS — Z87.09 HISTORY OF ASTHMA: ICD-10-CM

## 2019-03-21 DIAGNOSIS — R00.2 PALPITATIONS: ICD-10-CM

## 2019-03-21 DIAGNOSIS — M79.89 LEG SWELLING: ICD-10-CM

## 2019-03-21 PROCEDURE — 99213 OFFICE O/P EST LOW 20 MIN: CPT | Performed by: NURSE PRACTITIONER

## 2019-03-21 PROCEDURE — 1101F PT FALLS ASSESS-DOCD LE1/YR: CPT | Performed by: NURSE PRACTITIONER

## 2019-03-21 PROCEDURE — 1123F ACP DISCUSS/DSCN MKR DOCD: CPT | Performed by: NURSE PRACTITIONER

## 2019-03-21 PROCEDURE — 3017F COLORECTAL CA SCREEN DOC REV: CPT | Performed by: NURSE PRACTITIONER

## 2019-03-21 PROCEDURE — 1036F TOBACCO NON-USER: CPT | Performed by: NURSE PRACTITIONER

## 2019-03-21 PROCEDURE — G8598 ASA/ANTIPLAT THER USED: HCPCS | Performed by: NURSE PRACTITIONER

## 2019-03-21 PROCEDURE — G8417 CALC BMI ABV UP PARAM F/U: HCPCS | Performed by: NURSE PRACTITIONER

## 2019-03-21 PROCEDURE — 93000 ELECTROCARDIOGRAM COMPLETE: CPT | Performed by: NURSE PRACTITIONER

## 2019-03-21 PROCEDURE — 4040F PNEUMOC VAC/ADMIN/RCVD: CPT | Performed by: NURSE PRACTITIONER

## 2019-03-21 PROCEDURE — G8482 FLU IMMUNIZE ORDER/ADMIN: HCPCS | Performed by: NURSE PRACTITIONER

## 2019-03-21 PROCEDURE — G8427 DOCREV CUR MEDS BY ELIG CLIN: HCPCS | Performed by: NURSE PRACTITIONER

## 2019-03-21 RX ORDER — HYDROCHLOROTHIAZIDE 25 MG/1
25 TABLET ORAL EVERY MORNING
Qty: 90 TABLET | Refills: 3 | Status: SHIPPED | OUTPATIENT
Start: 2019-03-21 | End: 2019-07-30 | Stop reason: SDUPTHER

## 2019-03-21 RX ORDER — NITROGLYCERIN 0.4 MG/1
0.4 TABLET SUBLINGUAL EVERY 5 MIN PRN
Qty: 25 TABLET | Refills: 3 | Status: SHIPPED | OUTPATIENT
Start: 2019-03-21 | End: 2020-01-01 | Stop reason: SDUPTHER

## 2019-03-21 RX ORDER — HYDROCHLOROTHIAZIDE 12.5 MG/1
25 TABLET ORAL DAILY
Qty: 90 TABLET | Refills: 3 | Status: SHIPPED | OUTPATIENT
Start: 2019-03-21 | End: 2019-03-21 | Stop reason: CLARIF

## 2019-04-23 ENCOUNTER — OFFICE VISIT (OUTPATIENT)
Dept: CARDIOLOGY CLINIC | Age: 74
End: 2019-04-23
Payer: MEDICARE

## 2019-04-23 VITALS
HEART RATE: 66 BPM | DIASTOLIC BLOOD PRESSURE: 78 MMHG | WEIGHT: 218 LBS | BODY MASS INDEX: 32.29 KG/M2 | HEIGHT: 69 IN | SYSTOLIC BLOOD PRESSURE: 148 MMHG

## 2019-04-23 DIAGNOSIS — R06.09 DYSPNEA ON EXERTION: ICD-10-CM

## 2019-04-23 DIAGNOSIS — Z95.2 S/P AVR: ICD-10-CM

## 2019-04-23 DIAGNOSIS — Z95.0 PACEMAKER: Primary | ICD-10-CM

## 2019-04-23 DIAGNOSIS — I10 ESSENTIAL HYPERTENSION: ICD-10-CM

## 2019-04-23 PROCEDURE — 3017F COLORECTAL CA SCREEN DOC REV: CPT | Performed by: NUCLEAR MEDICINE

## 2019-04-23 PROCEDURE — G8598 ASA/ANTIPLAT THER USED: HCPCS | Performed by: NUCLEAR MEDICINE

## 2019-04-23 PROCEDURE — G8427 DOCREV CUR MEDS BY ELIG CLIN: HCPCS | Performed by: NUCLEAR MEDICINE

## 2019-04-23 PROCEDURE — 1123F ACP DISCUSS/DSCN MKR DOCD: CPT | Performed by: NUCLEAR MEDICINE

## 2019-04-23 PROCEDURE — 4040F PNEUMOC VAC/ADMIN/RCVD: CPT | Performed by: NUCLEAR MEDICINE

## 2019-04-23 PROCEDURE — 99214 OFFICE O/P EST MOD 30 MIN: CPT | Performed by: NUCLEAR MEDICINE

## 2019-04-23 PROCEDURE — G8417 CALC BMI ABV UP PARAM F/U: HCPCS | Performed by: NUCLEAR MEDICINE

## 2019-04-23 PROCEDURE — 1036F TOBACCO NON-USER: CPT | Performed by: NUCLEAR MEDICINE

## 2019-04-23 NOTE — PROGRESS NOTES
Pt here for one month fu appt    Pt c/o of CP and SOB with exertion     Pt denies lightheadedness/dizziness, NIKIA, tachycardia/palpitations
sounds  Extremities:   No edema, no cyanosis, good peripheral pulses  Neurological:   Awake, alert, oriented. No obvious focal deficits  Musculoskelatal:  No obvious deformities    Assessment:      Diagnosis Orders   1. Pacemaker     2. Essential hypertension     3. Dyspnea on exertion     4. S/P AVR     concerning patient  Symptoms as above  Pulmonary vs cardiac       Plan:  No follow-ups on file. Discussed  PFT and stress test   Further to follow   Continue risk factor modification and medical management  Thank you for allowing me to participate in the care of your patient. Please don't hesitate to contact me regarding any further issues related to the patient care    Orders Placed:  No orders of the defined types were placed in this encounter. Medications Prescribed:  No orders of the defined types were placed in this encounter. Discussed use, benefit, and side effects of prescribed medications. All patient questions answered. Pt voicedunderstanding. Instructed to continue current medications, diet and exercise. Continue risk factor modification and medical management. Patient agreed with treatment plan. Follow up as directed.     Electronically signedby Wu Cormier MD on 4/23/2019 at 10:05 AM

## 2019-05-02 ENCOUNTER — PROCEDURE VISIT (OUTPATIENT)
Dept: CARDIOLOGY CLINIC | Age: 74
End: 2019-05-02
Payer: MEDICARE

## 2019-05-02 DIAGNOSIS — Z95.0 BIVENTRICULAR CARDIAC PACEMAKER IN SITU: Primary | ICD-10-CM

## 2019-05-02 PROCEDURE — 93294 REM INTERROG EVL PM/LDLS PM: CPT | Performed by: INTERNAL MEDICINE

## 2019-05-02 PROCEDURE — 93296 REM INTERROG EVL PM/IDS: CPT | Performed by: INTERNAL MEDICINE

## 2019-05-02 NOTE — PROGRESS NOTES
biv pacer   7.9-8.3 years on device  P waves 4.4 RV 12  At imped 440    51% atrial paced 94% vent paced   0 mode switches

## 2019-05-08 DIAGNOSIS — E78.00 PURE HYPERCHOLESTEROLEMIA: ICD-10-CM

## 2019-05-08 RX ORDER — ATORVASTATIN CALCIUM 80 MG/1
TABLET, FILM COATED ORAL
Qty: 90 TABLET | Refills: 0 | Status: SHIPPED | OUTPATIENT
Start: 2019-05-08 | End: 2019-07-30 | Stop reason: SDUPTHER

## 2019-05-13 ENCOUNTER — HOSPITAL ENCOUNTER (OUTPATIENT)
Dept: NON INVASIVE DIAGNOSTICS | Age: 74
Discharge: HOME | End: 2019-05-13
Payer: MEDICARE

## 2019-05-13 ENCOUNTER — HOSPITAL ENCOUNTER (OUTPATIENT)
Dept: PULMONOLOGY | Age: 74
Discharge: HOME | End: 2019-05-13
Payer: MEDICARE

## 2019-05-13 ENCOUNTER — TELEPHONE (OUTPATIENT)
Dept: CARDIOLOGY CLINIC | Age: 74
End: 2019-05-13

## 2019-05-13 VITALS — HEIGHT: 66 IN | WEIGHT: 213 LBS | BODY MASS INDEX: 34.23 KG/M2

## 2019-05-13 DIAGNOSIS — I10 ESSENTIAL HYPERTENSION: Primary | ICD-10-CM

## 2019-05-13 DIAGNOSIS — R06.09 DYSPNEA ON EXERTION: ICD-10-CM

## 2019-05-13 DIAGNOSIS — Z95.2 S/P AVR: ICD-10-CM

## 2019-05-13 DIAGNOSIS — R94.39 ABNORMAL STRESS TEST: ICD-10-CM

## 2019-05-13 PROCEDURE — 94729 DIFFUSING CAPACITY: CPT

## 2019-05-13 PROCEDURE — 78452 HT MUSCLE IMAGE SPECT MULT: CPT

## 2019-05-13 PROCEDURE — 94010 BREATHING CAPACITY TEST: CPT

## 2019-05-13 PROCEDURE — 3430000000 HC RX DIAGNOSTIC RADIOPHARMACEUTICAL: Performed by: NUCLEAR MEDICINE

## 2019-05-13 PROCEDURE — 93017 CV STRESS TEST TRACING ONLY: CPT | Performed by: NUCLEAR MEDICINE

## 2019-05-13 PROCEDURE — 94726 PLETHYSMOGRAPHY LUNG VOLUMES: CPT

## 2019-05-13 PROCEDURE — 6360000002 HC RX W HCPCS

## 2019-05-13 PROCEDURE — 2709999900 HC NON-CHARGEABLE SUPPLY

## 2019-05-13 PROCEDURE — A9500 TC99M SESTAMIBI: HCPCS | Performed by: NUCLEAR MEDICINE

## 2019-05-13 RX ADMIN — Medication 35 MILLICURIE: at 09:20

## 2019-05-13 RX ADMIN — Medication 11 MILLICURIE: at 08:30

## 2019-05-14 ENCOUNTER — TELEPHONE (OUTPATIENT)
Dept: CARDIOLOGY CLINIC | Age: 74
End: 2019-05-14

## 2019-05-20 NOTE — TELEPHONE ENCOUNTER
Pt called saying his SOB is getting worse   States he gets very SOB walking up steps   Recommendations?

## 2019-05-20 NOTE — TELEPHONE ENCOUNTER
Refer to andriea or Jesica Samayoa whoever can see him first   He does have COPD   He is also supposed to be scheduled for a cath somewhere on the list

## 2019-05-21 NOTE — TELEPHONE ENCOUNTER
Called Dr. Sapna Queen and Dr. Karen Damon office and Dr. Rain Damico can get him in sooner. Soonest appt is 7/3/2019 at 11:20am.   Appt scheduled for patient. Called to talk to patient but he is currently at [place] for appt and he will call our office back.

## 2019-05-22 NOTE — TELEPHONE ENCOUNTER
Spoke with patient in regards to his appt times. He has been notified of all appt times and dates. Voices understanding.

## 2019-06-02 ENCOUNTER — PREP FOR PROCEDURE (OUTPATIENT)
Dept: CARDIOLOGY | Age: 74
End: 2019-06-02

## 2019-06-02 RX ORDER — DIPHENHYDRAMINE HYDROCHLORIDE 50 MG/ML
50 INJECTION INTRAMUSCULAR; INTRAVENOUS ONCE
Status: CANCELLED | OUTPATIENT
Start: 2019-06-02 | End: 2019-06-02

## 2019-06-02 RX ORDER — SODIUM CHLORIDE 9 MG/ML
INJECTION, SOLUTION INTRAVENOUS CONTINUOUS
Status: CANCELLED | OUTPATIENT
Start: 2019-06-02

## 2019-06-02 RX ORDER — SODIUM CHLORIDE 0.9 % (FLUSH) 0.9 %
10 SYRINGE (ML) INJECTION PRN
Status: CANCELLED | OUTPATIENT
Start: 2019-06-02

## 2019-06-02 RX ORDER — SODIUM CHLORIDE 0.9 % (FLUSH) 0.9 %
10 SYRINGE (ML) INJECTION EVERY 12 HOURS SCHEDULED
Status: CANCELLED | OUTPATIENT
Start: 2019-06-02

## 2019-06-02 RX ORDER — ASPIRIN 325 MG
325 TABLET ORAL ONCE
Status: CANCELLED | OUTPATIENT
Start: 2019-06-02 | End: 2019-06-02

## 2019-06-02 RX ORDER — NITROGLYCERIN 0.4 MG/1
0.4 TABLET SUBLINGUAL EVERY 5 MIN PRN
Status: CANCELLED | OUTPATIENT
Start: 2019-06-02

## 2019-06-03 ENCOUNTER — HOSPITAL ENCOUNTER (OUTPATIENT)
Dept: INPATIENT UNIT | Age: 74
Discharge: HOME | End: 2019-06-03
Attending: NUCLEAR MEDICINE | Admitting: NUCLEAR MEDICINE
Payer: MEDICARE

## 2019-06-03 VITALS
BODY MASS INDEX: 33.75 KG/M2 | WEIGHT: 210 LBS | RESPIRATION RATE: 18 BRPM | SYSTOLIC BLOOD PRESSURE: 131 MMHG | HEART RATE: 61 BPM | HEIGHT: 66 IN | DIASTOLIC BLOOD PRESSURE: 60 MMHG | OXYGEN SATURATION: 95 % | TEMPERATURE: 97.6 F

## 2019-06-03 LAB
ABO: NORMAL
ANION GAP SERPL CALCULATED.3IONS-SCNC: 10 MEQ/L (ref 8–16)
ANTIBODY SCREEN: NORMAL
APTT: 39.8 SECONDS (ref 22–38)
BUN BLDV-MCNC: 12 MG/DL (ref 7–22)
CALCIUM SERPL-MCNC: 9.3 MG/DL (ref 8.5–10.5)
CHLORIDE BLD-SCNC: 107 MEQ/L (ref 98–111)
CO2: 25 MEQ/L (ref 23–33)
CREAT SERPL-MCNC: 0.8 MG/DL (ref 0.4–1.2)
EKG ATRIAL RATE: 60 BPM
EKG P AXIS: 73 DEGREES
EKG P-R INTERVAL: 162 MS
EKG Q-T INTERVAL: 504 MS
EKG QRS DURATION: 160 MS
EKG QTC CALCULATION (BAZETT): 504 MS
EKG R AXIS: -124 DEGREES
EKG T AXIS: 87 DEGREES
EKG VENTRICULAR RATE: 60 BPM
ERYTHROCYTE [DISTWIDTH] IN BLOOD BY AUTOMATED COUNT: 13.3 % (ref 11.5–14.5)
ERYTHROCYTE [DISTWIDTH] IN BLOOD BY AUTOMATED COUNT: 45.3 FL (ref 35–45)
GFR SERPL CREATININE-BSD FRML MDRD: > 90 ML/MIN/1.73M2
GLUCOSE BLD-MCNC: 137 MG/DL (ref 70–108)
HCT VFR BLD CALC: 44.4 % (ref 42–52)
HEMOGLOBIN: 15.2 GM/DL (ref 14–18)
INR BLD: 1.9 (ref 0.85–1.13)
MCH RBC QN AUTO: 31.7 PG (ref 26–33)
MCHC RBC AUTO-ENTMCNC: 34.2 GM/DL (ref 32.2–35.5)
MCV RBC AUTO: 92.5 FL (ref 80–94)
PLATELET # BLD: 177 THOU/MM3 (ref 130–400)
PMV BLD AUTO: 10.2 FL (ref 9.4–12.4)
POTASSIUM REFLEX MAGNESIUM: 4.6 MEQ/L (ref 3.5–5.2)
RBC # BLD: 4.8 MILL/MM3 (ref 4.7–6.1)
RH FACTOR: NORMAL
SODIUM BLD-SCNC: 142 MEQ/L (ref 135–145)
WBC # BLD: 7.5 THOU/MM3 (ref 4.8–10.8)

## 2019-06-03 PROCEDURE — 93005 ELECTROCARDIOGRAM TRACING: CPT | Performed by: NURSE PRACTITIONER

## 2019-06-03 PROCEDURE — 36415 COLL VENOUS BLD VENIPUNCTURE: CPT

## 2019-06-03 PROCEDURE — 93454 CORONARY ARTERY ANGIO S&I: CPT | Performed by: NUCLEAR MEDICINE

## 2019-06-03 PROCEDURE — 2709999900 HC NON-CHARGEABLE SUPPLY

## 2019-06-03 PROCEDURE — 86901 BLOOD TYPING SEROLOGIC RH(D): CPT

## 2019-06-03 PROCEDURE — C1894 INTRO/SHEATH, NON-LASER: HCPCS

## 2019-06-03 PROCEDURE — 2500000003 HC RX 250 WO HCPCS

## 2019-06-03 PROCEDURE — 86900 BLOOD TYPING SEROLOGIC ABO: CPT

## 2019-06-03 PROCEDURE — 6360000002 HC RX W HCPCS

## 2019-06-03 PROCEDURE — 80048 BASIC METABOLIC PNL TOTAL CA: CPT

## 2019-06-03 PROCEDURE — C1769 GUIDE WIRE: HCPCS

## 2019-06-03 PROCEDURE — 85730 THROMBOPLASTIN TIME PARTIAL: CPT

## 2019-06-03 PROCEDURE — 2580000003 HC RX 258: Performed by: NURSE PRACTITIONER

## 2019-06-03 PROCEDURE — 85027 COMPLETE CBC AUTOMATED: CPT

## 2019-06-03 PROCEDURE — 86850 RBC ANTIBODY SCREEN: CPT

## 2019-06-03 PROCEDURE — 93455 CORONARY ART/GRFT ANGIO S&I: CPT | Performed by: NUCLEAR MEDICINE

## 2019-06-03 PROCEDURE — 85610 PROTHROMBIN TIME: CPT

## 2019-06-03 PROCEDURE — 93010 ELECTROCARDIOGRAM REPORT: CPT | Performed by: NUCLEAR MEDICINE

## 2019-06-03 PROCEDURE — 6360000004 HC RX CONTRAST MEDICATION: Performed by: NUCLEAR MEDICINE

## 2019-06-03 RX ORDER — SODIUM CHLORIDE 9 MG/ML
INJECTION, SOLUTION INTRAVENOUS CONTINUOUS
Status: DISCONTINUED | OUTPATIENT
Start: 2019-06-03 | End: 2019-06-03 | Stop reason: HOSPADM

## 2019-06-03 RX ORDER — ATROPINE SULFATE 0.4 MG/ML
0.5 AMPUL (ML) INJECTION
Status: DISCONTINUED | OUTPATIENT
Start: 2019-06-03 | End: 2019-06-03 | Stop reason: HOSPADM

## 2019-06-03 RX ORDER — SODIUM CHLORIDE 0.9 % (FLUSH) 0.9 %
10 SYRINGE (ML) INJECTION EVERY 12 HOURS SCHEDULED
Status: DISCONTINUED | OUTPATIENT
Start: 2019-06-03 | End: 2019-06-03 | Stop reason: HOSPADM

## 2019-06-03 RX ORDER — DIPHENHYDRAMINE HYDROCHLORIDE 50 MG/ML
50 INJECTION INTRAMUSCULAR; INTRAVENOUS ONCE
Status: DISCONTINUED | OUTPATIENT
Start: 2019-06-03 | End: 2019-06-03 | Stop reason: HOSPADM

## 2019-06-03 RX ORDER — ASPIRIN 325 MG
325 TABLET ORAL ONCE
Status: DISCONTINUED | OUTPATIENT
Start: 2019-06-03 | End: 2019-06-03 | Stop reason: HOSPADM

## 2019-06-03 RX ORDER — SODIUM CHLORIDE 0.9 % (FLUSH) 0.9 %
10 SYRINGE (ML) INJECTION PRN
Status: DISCONTINUED | OUTPATIENT
Start: 2019-06-03 | End: 2019-06-03

## 2019-06-03 RX ORDER — ACETAMINOPHEN 325 MG/1
650 TABLET ORAL EVERY 4 HOURS PRN
Status: DISCONTINUED | OUTPATIENT
Start: 2019-06-03 | End: 2019-06-03 | Stop reason: HOSPADM

## 2019-06-03 RX ORDER — SODIUM CHLORIDE 9 MG/ML
INJECTION, SOLUTION INTRAVENOUS CONTINUOUS
Status: DISCONTINUED | OUTPATIENT
Start: 2019-06-03 | End: 2019-06-03

## 2019-06-03 RX ORDER — SODIUM CHLORIDE 0.9 % (FLUSH) 0.9 %
10 SYRINGE (ML) INJECTION EVERY 12 HOURS SCHEDULED
Status: CANCELLED | OUTPATIENT
Start: 2019-06-03

## 2019-06-03 RX ORDER — SODIUM CHLORIDE 0.9 % (FLUSH) 0.9 %
10 SYRINGE (ML) INJECTION PRN
Status: DISCONTINUED | OUTPATIENT
Start: 2019-06-03 | End: 2019-06-03 | Stop reason: HOSPADM

## 2019-06-03 RX ORDER — NITROGLYCERIN 0.4 MG/1
0.4 TABLET SUBLINGUAL EVERY 5 MIN PRN
Status: DISCONTINUED | OUTPATIENT
Start: 2019-06-03 | End: 2019-06-03 | Stop reason: HOSPADM

## 2019-06-03 RX ADMIN — SODIUM CHLORIDE: 9 INJECTION, SOLUTION INTRAVENOUS at 13:39

## 2019-06-03 RX ADMIN — IOPAMIDOL 70 ML: 755 INJECTION, SOLUTION INTRAVENOUS at 15:05

## 2019-06-03 NOTE — PLAN OF CARE
Problem: Discharge Planning:  Goal: Participates in care planning  Description  Participates in care planning  Outcome: Ongoing   Care plan reviewed with patient and wife. Patient and wife verbalize understanding of the plan of care and contribute to goal setting.

## 2019-06-03 NOTE — PROGRESS NOTES
Pt care assumed post cardiac cath  Right radial site with vasc band and armboard  Pt denies any pain, numbness or tingling  IV 0.9NS cont'd

## 2019-06-03 NOTE — PROGRESS NOTES
Pt admitted to  2E17 ambulatory for cardiac cath. Pt NPO. Patient accompanied by wife. Vital signs obtained. Assessment and data collection initiated. Oriented to room. Policies and procedures for 2E explained   All questions answered with no further questions at this time. Fall prevention and safety precautions discussed with patient.

## 2019-06-03 NOTE — PROGRESS NOTES
Pt assisted to [facility] to vd  Amb in [place] - pt GAN  Pt denies any dizziness or lightheadedness  Ret'd to room, resting on edge of bed

## 2019-06-03 NOTE — PLAN OF CARE
Problem: Discharge Planning:  Goal: Participates in care planning  Description  Participates in care planning  6/3/2019 1842 by Jeffrey Martel RN  Outcome: Met This Shift  6/3/2019 1258 by Jeffrey Martel RN  Outcome: Ongoing   Pt discharged home

## 2019-06-03 NOTE — PROCEDURES
[address] [place] [address]                            CARDIAC CATHETERIZATION    PATIENT NAME: Chen Wright                    :        1945  MED REC NO:   [id number]                           ROOM:       0017  ACCOUNT NO:   [de-identified]                           ADMIT DATE: 2019  PROVIDER:     Eveline Pratt M.D.    Rosalind Ktosse:  2019    CLINICAL HISTORY AND INDICATION:  This is a pleasant 71-year-old  gentleman who continues to have symptoms of shortness of breath that  underwent a stress test, showing anterior ischemia. Referred for  cardiac catheterization to evaluate coronary anatomy. The patient's INR  was 1.9. Given his Coumadin, radial approach was done. No LV-gram was  done. PROCEDURES:  1. Left heart cath without LV-gram.  2.  Coronary angiogram, right and left. 3.  SVG visualization x1.  4.  Sedation, 2 of Versed and 50 of fentanyl between 02:30 and 03:00  p.m. in my presence and under my supervision. PROCEDURE DETAILS:  Please refer to my catheterization detailed note. CORONARY ARTERIOGRAM RESULTS:  1. Left main has some ostial nonobstructive 20% to 30% stenosis, gives  rise to LAD and left circ. 2.  LAD has diffuse nonobstructive 30% to 40% stenosis with a diagonal  artery, but has 50% stenosis. Diagonal artery bypass seems to be  occluded with retrograde filling. 3.  Left circumflex artery has nonobstructive 50% stenosis. 4.  Right coronary artery is large dominant, has diffuse 40% to 50%  nonobstructive disease. BYPASS VISUALIZATION:  SVG graft to diag is likely totally occluded. It  was difficult to visualize the ostium; however, it does show retrograde  filling from the native artery. CONCLUSION:  1. Nonobstructive moderate coronary artery disease. 2.  Totally occluded diagonal artery graft. 3. No LV-gram was done. 4.  No complications.     RECOMMENDATIONS:  At this point, medical treatment is recommended. Continuing to evaluate the patient's shortness of breath, specifically  the valve would be recommended. The patient will be followed  accordingly.         Martin Marti M.D.    D: 06/03/2019 15:08:54       T: 06/03/2019 16:00:09     [email or web address]  Job#: [id number]     Doc#: [id number]    CC:

## 2019-06-03 NOTE — H&P
Facility-Administered Medications:     0.9 % sodium chloride infusion, , Intravenous, Continuous, DEAN Bird CNP, Last Rate: 75 mL/hr at 06/03/19 1339    aspirin tablet 325 mg, 325 mg, Oral, Once, DEAN Fitzpatrick CNP    diphenhydrAMINE (BENADRYL) injection 50 mg, 50 mg, Intravenous, Once, DEAN Bird CNP    hydrocortisone sodium succinate PF (SOLU-CORTEF) injection 200 mg, 200 mg, Intravenous, Once, DEAN Fitzpatrick CNP    nitroGLYCERIN (NITROSTAT) SL tablet 0.4 mg, 0.4 mg, Sublingual, Q5 Min PRN, DEAN Bird CNP    sodium chloride flush 0.9 % injection 10 mL, 10 mL, Intravenous, 2 times per day, DEAN Bird CNP    sodium chloride flush 0.9 % injection 10 mL, 10 mL, Intravenous, PRN, DEAN Bird CNP  Prior to Admission medications    Medication Sig Start Date End Date Taking? Authorizing Provider   atorvastatin (LIPITOR) 80 MG tablet TAKE 1 TABLET BY MOUTH ONE TIME A DAY  5/8/19  Yes Tito Davidson MD   hydrochlorothiazide (HYDRODIURIL) 25 MG tablet Take 1 tablet by mouth every morning 3/21/19  Yes DEAN Lucia CNP   lisinopril (PRINIVIL;ZESTRIL) 10 MG tablet Take 1 tablet by mouth 2 times daily 3/7/19  Yes Tito Davidson MD   allopurinol (ZYLOPRIM) 300 MG tablet Take 1 tablet by mouth daily 2/4/19  Yes Tito Davidson MD   metoprolol tartrate (LOPRESSOR) 50 MG tablet Take 1 tablet by mouth 2 times daily 2/4/19  Yes Tito Davidson MD   aspirin 81 MG EC tablet Take 81 mg by mouth daily.      Yes Historical Provider, MD   nitroGLYCERIN (NITROSTAT) 0.4 MG SL tablet Place 1 tablet under the tongue every 5 minutes as needed for Chest pain 3/21/19   DEAN Lucia CNP   warfarin (COUMADIN) 2 MG tablet AS DIRECTED PER DR EATON  Patient taking differently: Take 1 mg by mouth daily AS DIRECTED PER DR EATON - currently takes with 5 mg tab to equal 6 mg daily 2/4/19 Tito Davidson MD   warfarin (COUMADIN) 5 MG tablet AS DIRECTED PER DR EATON  Patient taking differently: Take 5 mg by mouth daily AS DIRECTED PER DR EATON - take with 1/2 of 2 mg tablet daily to equal 6 mg 2/4/19   Tito Davidson MD   triamcinolone (KENALOG) 0.1 % cream Apply topically as needed Apply topically 2 times daily. Historical Provider, MD     Additional information:       VITAL SIGNS   Vitals:    06/03/19 1321   BP: (!) 157/70   Pulse: 60   Resp: 16   Temp: 98 °F (36.7 °C)   SpO2: 96%       PHYSICAL:   General: No acute distress  HEENT:  Unremarkable for age  Neck: without increased JVD, carotid pulses 2+ bilaterally without bruits  Heart: RRR, S1 & S2 WNL, S4 gallop, without murmurs or rubs    Lungs: Clear to auscultation    Abdomen: BS present, without HSM, masses, or tenderness    Extremities: without C,C,E.  Pulses 2+ bilaterally  Mental Status: Alert & Oriented        PLANNED PROCEDURE   [x]Cath  []PCI                []Pacemaker/AICD  []JANICE             []Cardioversion []Peripheral angiography/PTA  []Other:      SEDATION  Planned agent:[x]Midazolam []Meperidine [x]Sublimaze []Morphine  []Diazepam  []Other:     ASA Classification:  []1 [x]2 []3 []4 []5  Class 1: A normal healthy patient  Class 2: Pt with mild to moderate systemic disease  Class 3: Severe systemic disease or disturbance  Class 4: Severe systemic disorders that are already life threatening. Class 5: Moribund pt with little chances of survival, for more than 24 hours. Mallampati I Airway Classification:   []1 [x]2 []3 []4    [x]Pre-procedure diagnostic studies complete and results available. Comment:    [x]Previous sedation/anesthesia experiences assessed. Comment:    [x]The patient is an appropriate candidate to undergo the planned procedure sedation and anesthesia.  (Refer to nursing sedation/analgesia documentation record)  [x]Formulation and discussion of sedation/procedure plan, risks, and expectations with patient and/or responsible adult completed. [x]Patient examined immediately prior to the procedure.  (Refer to nursing sedation/analgesia documentation record)    Jerman Coles  Electronically signed 6/3/2019 at 2:09 PM

## 2019-06-03 NOTE — PROGRESS NOTES
IV site/fluids discont'd  Telemetry discont'd  Discharge instructions reviewed with pt and wife - they voice understanding of f/u appointment, how to care for procedure site and activity restrictions

## 2019-06-03 NOTE — OP NOTE
[facility]  Sedation/Analgesia Post Sedation Record    Pt Name: Trudi Vega  Account number: [de-identified]  MRN: [id number]  YOB: 1945  Procedure Performed By: Bruce Charles MD [facility]  Primary Care Physician: Angelo Taylor MD  Date: 6/3/2019    POST-PROCEDURE    Physicians/Assistants: Bruce Charles MD [facility]  Procedure Performed: cath    Sedation/Anesthesia: Versed/ Fentanyl and 2% xylocaine local anesthesia. Estimated Blood Loss: < 10 ml.    Specimens Removed: None       Disposition of Specimen: N/A      Complications: None Immediate      Post-procedure Diagnosis/Findings: mod CAD             Recommendations: medical RX               Bruce Charles MD [facility]  Electronically signed 6/3/2019 at 3:06 PM

## 2019-06-04 ENCOUNTER — PATIENT MESSAGE (OUTPATIENT)
Dept: FAMILY MEDICINE CLINIC | Age: 74
End: 2019-06-04

## 2019-07-18 ENCOUNTER — OFFICE VISIT (OUTPATIENT)
Dept: PULMONOLOGY | Age: 74
End: 2019-07-18
Payer: MEDICARE

## 2019-07-18 VITALS
HEART RATE: 67 BPM | SYSTOLIC BLOOD PRESSURE: 138 MMHG | WEIGHT: 217.6 LBS | DIASTOLIC BLOOD PRESSURE: 82 MMHG | BODY MASS INDEX: 34.97 KG/M2 | OXYGEN SATURATION: 98 % | TEMPERATURE: 97.1 F | HEIGHT: 66 IN

## 2019-07-18 DIAGNOSIS — I50.22 CHRONIC SYSTOLIC CONGESTIVE HEART FAILURE (HCC): ICD-10-CM

## 2019-07-18 DIAGNOSIS — Z95.2 S/P AVR (AORTIC VALVE REPLACEMENT): ICD-10-CM

## 2019-07-18 DIAGNOSIS — R06.09 DOE (DYSPNEA ON EXERTION): ICD-10-CM

## 2019-07-18 DIAGNOSIS — J44.9 STAGE 2 MODERATE COPD BY GOLD CLASSIFICATION (HCC): Primary | ICD-10-CM

## 2019-07-18 DIAGNOSIS — E66.9 OBESITY (BMI 30-39.9): ICD-10-CM

## 2019-07-18 PROCEDURE — 3017F COLORECTAL CA SCREEN DOC REV: CPT | Performed by: INTERNAL MEDICINE

## 2019-07-18 PROCEDURE — 4040F PNEUMOC VAC/ADMIN/RCVD: CPT | Performed by: INTERNAL MEDICINE

## 2019-07-18 PROCEDURE — 1036F TOBACCO NON-USER: CPT | Performed by: INTERNAL MEDICINE

## 2019-07-18 PROCEDURE — 1123F ACP DISCUSS/DSCN MKR DOCD: CPT | Performed by: INTERNAL MEDICINE

## 2019-07-18 PROCEDURE — 99999 ALPHA-1-ANTITRYPSIN: CPT | Performed by: INTERNAL MEDICINE

## 2019-07-18 PROCEDURE — 94618 PULMONARY STRESS TESTING: CPT | Performed by: INTERNAL MEDICINE

## 2019-07-18 PROCEDURE — G8427 DOCREV CUR MEDS BY ELIG CLIN: HCPCS | Performed by: INTERNAL MEDICINE

## 2019-07-18 PROCEDURE — 3023F SPIROM DOC REV: CPT | Performed by: INTERNAL MEDICINE

## 2019-07-18 PROCEDURE — G8598 ASA/ANTIPLAT THER USED: HCPCS | Performed by: INTERNAL MEDICINE

## 2019-07-18 PROCEDURE — 99215 OFFICE O/P EST HI 40 MIN: CPT | Performed by: INTERNAL MEDICINE

## 2019-07-18 PROCEDURE — G8926 SPIRO NO PERF OR DOC: HCPCS | Performed by: INTERNAL MEDICINE

## 2019-07-18 PROCEDURE — G8417 CALC BMI ABV UP PARAM F/U: HCPCS | Performed by: INTERNAL MEDICINE

## 2019-07-18 RX ORDER — ALBUTEROL SULFATE 90 UG/1
2 AEROSOL, METERED RESPIRATORY (INHALATION) EVERY 6 HOURS PRN
Qty: 1 INHALER | Refills: 3 | Status: SHIPPED | OUTPATIENT
Start: 2019-07-18 | End: 2019-07-23 | Stop reason: SDUPTHER

## 2019-07-18 ASSESSMENT — ENCOUNTER SYMPTOMS
CHEST TIGHTNESS: 1
VOICE CHANGE: 0
GASTROINTESTINAL NEGATIVE: 1
COUGH: 1
STRIDOR: 0
TROUBLE SWALLOWING: 0
COLOR CHANGE: 0
APNEA: 0
EYES NEGATIVE: 1
SHORTNESS OF BREATH: 1
CHOKING: 1
BACK PAIN: 0
WHEEZING: 1

## 2019-07-18 NOTE — COMMUNICATION BODY
PER DR EATON - currently takes with 5 mg tab to equal 6 mg daily) 270 tablet 1    warfarin (COUMADIN) 5 MG tablet AS DIRECTED PER DR EATON (Patient taking differently: Take 5 mg by mouth daily AS DIRECTED PER DR EATON - take with 1/2 of 2 mg tablet daily to equal 6 mg) 90 tablet 1    allopurinol (ZYLOPRIM) 300 MG tablet Take 1 tablet by mouth daily 90 tablet 1    metoprolol tartrate (LOPRESSOR) 50 MG tablet Take 1 tablet by mouth 2 times daily 180 tablet 1    triamcinolone (KENALOG) 0.1 % cream Apply topically as needed Apply topically 2 times daily.  aspirin 81 MG EC tablet Take 81 mg by mouth daily. No current facility-administered medications for this visit. LABS - none   There were no vitals taken for this visit. Wt Readings from Last 3 Encounters:   06/03/19 210 lb (95.3 kg)   05/13/19 213 lb (96.6 kg)   04/23/19 218 lb (98.9 kg)     Neck Circumference - 17 in; Mallampati Score - 4      Objective:   Physical Exam   Constitutional: He is oriented to person, place, and time. He appears well-developed. No distress. Obese BMI 35   HENT:   Head: Atraumatic. Mallampati 3     Eyes: Pupils are equal, round, and reactive to light. EOM are normal. No scleral icterus. Neck: No JVD present. No tracheal deviation present. No thyromegaly present. Cardiovascular: Normal rate and intact distal pulses. Exam reveals no gallop and no friction rub. No murmur heard. Extrasystoles  Sternotomy   Pulmonary/Chest: Breath sounds normal. No stridor. No respiratory distress. He has no wheezes. He has no rales. He exhibits no tenderness. Abdominal: Soft. Bowel sounds are normal. He exhibits no distension and no mass. There is no tenderness. There is no rebound and no guarding. No hernia. Musculoskeletal: He exhibits tenderness. He exhibits no edema or deformity. Lymphadenopathy:     He has no cervical adenopathy. Neurological: He is alert and oriented to person, place, and time.  He displays normal Refill:  3      Will discuss with Dr Manley Courser if Lisinopril can be discontinued

## 2019-07-18 NOTE — PROGRESS NOTES
Subjective:      Patient ID: Rosie Burns is a 69 y.o. male. 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 AoV replacement CABG x1  20018  Block PPM    Review of Systems   Constitutional: Positive for activity change, appetite change and fatigue. Negative for chills, diaphoresis, fever and unexpected weight change. HENT: Positive for congestion. Negative for trouble swallowing and voice change. Eyes: Negative. Respiratory: Positive for cough, choking, chest tightness, shortness of breath and wheezing. Negative for apnea and stridor. Snoring   Cardiovascular: Positive for chest pain and leg swelling. Negative for palpitations. Gastrointestinal: Negative. Endocrine: Negative for cold intolerance, heat intolerance, polydipsia and polyphagia. Genitourinary: Negative. Musculoskeletal: Positive for arthralgias. Negative for back pain, gait problem, joint swelling, myalgias, neck pain and neck stiffness. Skin: Negative for color change, pallor and rash. Allergic/Immunologic: Negative for environmental allergies, food allergies and immunocompromised state. Neurological: Negative for dizziness, tremors, seizures, syncope, facial asymmetry, speech difficulty, weakness, light-headedness, numbness and headaches. Hematological: Negative for adenopathy. Does not bruise/bleed easily. Psychiatric/Behavioral: Negative for agitation, behavioral problems, confusion, decreased concentration, dysphoric mood, hallucinations, self-injury, sleep disturbance and suicidal ideas.  The

## 2019-07-18 NOTE — LETTER
Skin: Negative for color change, pallor and rash. Allergic/Immunologic: Negative for environmental allergies, food allergies and immunocompromised state. Neurological: Negative for dizziness, tremors, seizures, syncope, facial asymmetry, speech difficulty, weakness, light-headedness, numbness and headaches. Hematological: Negative for adenopathy. Does not bruise/bleed easily. Psychiatric/Behavioral: Negative for agitation, behavioral problems, confusion, decreased concentration, dysphoric mood, hallucinations, self-injury, sleep disturbance and suicidal ideas. The patient is not nervous/anxious and is not hyperactive.           All other systems reviewed and are negative    Lung Nodule Screening     [] Qualifies    [x] Does not qualify   [] Declined   [] Completed  Past Medical History:   Diagnosis Date    CAD (coronary artery disease)     Clotting disorder (HCC)     anemic    Compartment syndrome ([address]     Right arm    Heart murmur     Hyperlipidemia     Hypertension     Ventricular hypertrophy     left     Past Surgical History:   Procedure Laterality Date    AORTIC VALVE REPLACEMENT      sept 2008    ARM SURGERY Right     Compartment syndrome - may 2010    CARPAL TUNNEL RELEASE Bilateral     CORONARY ARTERY BYPASS GRAFT      sept 2008    DIAGNOSTIC CARDIAC CATH LAB PROCEDURE      ROTATOR CUFF REPAIR      with 3 pins placed      Social History     Tobacco Use    Smoking status: Never Smoker    Smokeless tobacco: Never Used   Substance Use Topics    Alcohol use: Yes     Comment: occasionally    Drug use: No      Allergies   Allergen Reactions    Erythromycin Hives    Sulfa Antibiotics Other (See Comments)     PT DOESN'T REMEMBER    Vasotec [Enalapril] Other (See Comments)     cough      Family History   Problem Relation Age of Onset    Alzheimer's Disease Mother     Cancer Father     Heart Disease Brother     Hypertension Brother     Stroke Brother Referral Priority:   Routine     Referral Type:   Eval and Treat     Referral Reason:   Specialty Services Required     Requested Specialty:   Rehabilitation     Number of Visits Requested:   1    6 Minute Walk Test     Standing Status:   Future     Standing Expiration Date:   7/18/2020      Orders Placed This Encounter   Medications    umeclidinium-vilanterol (ANORO ELLIPTA) 62.5-25 MCG/INH AEPB inhaler     Sig: Inhale 1 puff into the lungs daily     Dispense:  60 each     Refill:  5    albuterol sulfate HFA (PROVENTIL HFA) 108 (90 Base) MCG/ACT inhaler     Sig: Inhale 2 puffs into the lungs every 6 hours as needed for Wheezing     Dispense:  1 Inhaler     Refill:  3      Will discuss with Dr Ele Graham if Lisinopril can be discontinued            If you have questions, please do not hesitate to call me. I look forward to following Bruce Graves along with you.     Sincerely,        Isai Matthew MD

## 2019-07-22 ENCOUNTER — TELEPHONE (OUTPATIENT)
Dept: PULMONOLOGY | Age: 74
End: 2019-07-22

## 2019-07-23 RX ORDER — ALBUTEROL SULFATE 90 UG/1
2 AEROSOL, METERED RESPIRATORY (INHALATION) EVERY 6 HOURS PRN
Qty: 1 INHALER | Refills: 5 | Status: SHIPPED | OUTPATIENT
Start: 2019-07-23 | End: 2020-01-20 | Stop reason: SDUPTHER

## 2019-07-29 ENCOUNTER — OFFICE VISIT (OUTPATIENT)
Dept: CARDIOLOGY CLINIC | Age: 74
End: 2019-07-29
Payer: MEDICARE

## 2019-07-29 ENCOUNTER — TELEPHONE (OUTPATIENT)
Dept: PULMONOLOGY | Age: 74
End: 2019-07-29

## 2019-07-29 VITALS
SYSTOLIC BLOOD PRESSURE: 130 MMHG | HEIGHT: 68 IN | BODY MASS INDEX: 33.4 KG/M2 | HEART RATE: 96 BPM | WEIGHT: 220.4 LBS | DIASTOLIC BLOOD PRESSURE: 86 MMHG

## 2019-07-29 DIAGNOSIS — I25.10 CORONARY ARTERY DISEASE INVOLVING NATIVE CORONARY ARTERY OF NATIVE HEART WITHOUT ANGINA PECTORIS: Primary | ICD-10-CM

## 2019-07-29 DIAGNOSIS — I10 ESSENTIAL HYPERTENSION: ICD-10-CM

## 2019-07-29 PROCEDURE — 3017F COLORECTAL CA SCREEN DOC REV: CPT | Performed by: PHYSICIAN ASSISTANT

## 2019-07-29 PROCEDURE — 99213 OFFICE O/P EST LOW 20 MIN: CPT | Performed by: PHYSICIAN ASSISTANT

## 2019-07-29 PROCEDURE — G8427 DOCREV CUR MEDS BY ELIG CLIN: HCPCS | Performed by: PHYSICIAN ASSISTANT

## 2019-07-29 PROCEDURE — 1123F ACP DISCUSS/DSCN MKR DOCD: CPT | Performed by: PHYSICIAN ASSISTANT

## 2019-07-29 PROCEDURE — 4040F PNEUMOC VAC/ADMIN/RCVD: CPT | Performed by: PHYSICIAN ASSISTANT

## 2019-07-29 PROCEDURE — G8598 ASA/ANTIPLAT THER USED: HCPCS | Performed by: PHYSICIAN ASSISTANT

## 2019-07-29 PROCEDURE — 1036F TOBACCO NON-USER: CPT | Performed by: PHYSICIAN ASSISTANT

## 2019-07-29 PROCEDURE — G8417 CALC BMI ABV UP PARAM F/U: HCPCS | Performed by: PHYSICIAN ASSISTANT

## 2019-07-29 NOTE — TELEPHONE ENCOUNTER
Gayatri Singer was in office for visit 7/18/19 and was prescribed Anoro Ellipta. Gayatri Singer is now trying to get medication through [place] but Anoro is considered Non-Formulary. [facility] Suggest[facility] olodaterol/tiotropium first. Please advise.

## 2019-07-29 NOTE — PROGRESS NOTES
Social History Narrative    None       Family History   Problem Relation Age of Onset    Alzheimer's Disease Mother     Cancer Father     Heart Disease Brother     Hypertension Brother     Stroke Brother        Blood pressure 130/86, pulse 96, height 5' 7.5\" (1.715 m), weight 220 lb 6.4 oz (100 kg). General:   Well developed, well nourished  Lungs:   Clear to auscultation  Heart:    Normal S1 S2, No murmur, rubs, or gallops  Abdomen:   Soft, non tender, no organomegalies, positive bowel sounds  Extremities:   No edema, no cyanosis, good peripheral pulses  Neurological:   Awake, alert, oriented. No obvious focal deficits  Musculoskeletal:  No obvious deformities      Cardiac catheterization 6/3/2019  CONCLUSION:  1. Nonobstructive moderate coronary artery disease. 2.  Totally occluded diagonal artery graft. 3. No LV-gram was done. 4.  No complications. Diagnosis Orders   1. Coronary artery disease involving native coronary artery of native heart without angina pectoris     2. Essential hypertension           Continue Dr Ronal Walton current treatment plan    Continue same current medications and with constant vigilance to changes in symptoms and also any potential side effects. Return for care if become worse or seek medical attention immediately. The patient is educated on symptoms of heart disease that include chest pain and passing out, dizziness, etc and to report them if there is any change or go to emergency room.      Follow up with Dr Dayana Puente as scheduled or sooner if needed  (Please note that portions of this note were completed with a voice recognition program.  Efforts were made to edit the dictation but occasionally words are mis-transcribed.)      Willis Benitez PA-C

## 2019-07-30 ENCOUNTER — OFFICE VISIT (OUTPATIENT)
Dept: FAMILY MEDICINE CLINIC | Age: 74
End: 2019-07-30
Payer: MEDICARE

## 2019-07-30 ENCOUNTER — TELEPHONE (OUTPATIENT)
Dept: RESPIRATORY THERAPY | Age: 74
End: 2019-07-30

## 2019-07-30 VITALS
DIASTOLIC BLOOD PRESSURE: 60 MMHG | SYSTOLIC BLOOD PRESSURE: 112 MMHG | BODY MASS INDEX: 32.89 KG/M2 | HEART RATE: 66 BPM | WEIGHT: 217 LBS | HEIGHT: 68 IN

## 2019-07-30 DIAGNOSIS — E11.9 TYPE 2 DIABETES MELLITUS WITHOUT COMPLICATION, WITHOUT LONG-TERM CURRENT USE OF INSULIN (HCC): ICD-10-CM

## 2019-07-30 DIAGNOSIS — E78.00 PURE HYPERCHOLESTEROLEMIA: ICD-10-CM

## 2019-07-30 DIAGNOSIS — I25.10 ASCVD (ARTERIOSCLEROTIC CARDIOVASCULAR DISEASE): ICD-10-CM

## 2019-07-30 DIAGNOSIS — J42 CHRONIC BRONCHITIS, UNSPECIFIED CHRONIC BRONCHITIS TYPE (HCC): ICD-10-CM

## 2019-07-30 DIAGNOSIS — I20.8 EXERCISE-INDUCED ANGINA (HCC): ICD-10-CM

## 2019-07-30 DIAGNOSIS — I10 ESSENTIAL HYPERTENSION: Primary | ICD-10-CM

## 2019-07-30 DIAGNOSIS — R73.01 ELEVATED FASTING GLUCOSE: ICD-10-CM

## 2019-07-30 LAB — HBA1C MFR BLD: 7 %

## 2019-07-30 PROCEDURE — G8427 DOCREV CUR MEDS BY ELIG CLIN: HCPCS | Performed by: FAMILY MEDICINE

## 2019-07-30 PROCEDURE — 3023F SPIROM DOC REV: CPT | Performed by: FAMILY MEDICINE

## 2019-07-30 PROCEDURE — G8417 CALC BMI ABV UP PARAM F/U: HCPCS | Performed by: FAMILY MEDICINE

## 2019-07-30 PROCEDURE — 1036F TOBACCO NON-USER: CPT | Performed by: FAMILY MEDICINE

## 2019-07-30 PROCEDURE — G8598 ASA/ANTIPLAT THER USED: HCPCS | Performed by: FAMILY MEDICINE

## 2019-07-30 PROCEDURE — 3017F COLORECTAL CA SCREEN DOC REV: CPT | Performed by: FAMILY MEDICINE

## 2019-07-30 PROCEDURE — 1123F ACP DISCUSS/DSCN MKR DOCD: CPT | Performed by: FAMILY MEDICINE

## 2019-07-30 PROCEDURE — 83036 HEMOGLOBIN GLYCOSYLATED A1C: CPT | Performed by: FAMILY MEDICINE

## 2019-07-30 PROCEDURE — 4040F PNEUMOC VAC/ADMIN/RCVD: CPT | Performed by: FAMILY MEDICINE

## 2019-07-30 PROCEDURE — G8926 SPIRO NO PERF OR DOC: HCPCS | Performed by: FAMILY MEDICINE

## 2019-07-30 PROCEDURE — 99214 OFFICE O/P EST MOD 30 MIN: CPT | Performed by: FAMILY MEDICINE

## 2019-07-30 PROCEDURE — 2022F DILAT RTA XM EVC RTNOPTHY: CPT | Performed by: FAMILY MEDICINE

## 2019-07-30 PROCEDURE — 3045F PR MOST RECENT HEMOGLOBIN A1C LEVEL 7.0-9.0%: CPT | Performed by: FAMILY MEDICINE

## 2019-07-30 PROCEDURE — 82044 UR ALBUMIN SEMIQUANTITATIVE: CPT | Performed by: FAMILY MEDICINE

## 2019-07-30 RX ORDER — METOPROLOL TARTRATE 50 MG/1
50 TABLET, FILM COATED ORAL 2 TIMES DAILY
Qty: 180 TABLET | Refills: 1 | Status: SHIPPED | OUTPATIENT
Start: 2019-07-30 | End: 2019-07-30 | Stop reason: SDUPTHER

## 2019-07-30 RX ORDER — WARFARIN SODIUM 5 MG/1
TABLET ORAL
Qty: 90 TABLET | Refills: 1 | Status: SHIPPED | OUTPATIENT
Start: 2019-07-30 | End: 2020-01-20 | Stop reason: SDUPTHER

## 2019-07-30 RX ORDER — WARFARIN SODIUM 2 MG/1
TABLET ORAL
Qty: 270 TABLET | Refills: 1 | Status: SHIPPED | OUTPATIENT
Start: 2019-07-30 | End: 2020-01-20 | Stop reason: SDUPTHER

## 2019-07-30 RX ORDER — ALLOPURINOL 300 MG/1
300 TABLET ORAL DAILY
Qty: 90 TABLET | Refills: 1 | Status: SHIPPED | OUTPATIENT
Start: 2019-07-30 | End: 2020-01-20 | Stop reason: SDUPTHER

## 2019-07-30 RX ORDER — ATORVASTATIN CALCIUM 80 MG/1
TABLET, FILM COATED ORAL
Qty: 90 TABLET | Refills: 1 | Status: SHIPPED | OUTPATIENT
Start: 2019-07-30 | End: 2019-07-30 | Stop reason: SDUPTHER

## 2019-07-30 RX ORDER — WARFARIN SODIUM 5 MG/1
TABLET ORAL
Qty: 90 TABLET | Refills: 1 | Status: SHIPPED | OUTPATIENT
Start: 2019-07-30 | End: 2019-07-30 | Stop reason: SDUPTHER

## 2019-07-30 RX ORDER — WARFARIN SODIUM 2 MG/1
TABLET ORAL
Qty: 270 TABLET | Refills: 1 | Status: SHIPPED | OUTPATIENT
Start: 2019-07-30 | End: 2019-07-30 | Stop reason: SDUPTHER

## 2019-07-30 RX ORDER — HYDROCHLOROTHIAZIDE 25 MG/1
25 TABLET ORAL EVERY MORNING
Qty: 90 TABLET | Refills: 2 | Status: SHIPPED | OUTPATIENT
Start: 2019-07-30 | End: 2020-01-20 | Stop reason: SDUPTHER

## 2019-07-30 RX ORDER — METOPROLOL TARTRATE 50 MG/1
50 TABLET, FILM COATED ORAL 2 TIMES DAILY
Qty: 180 TABLET | Refills: 1 | Status: SHIPPED | OUTPATIENT
Start: 2019-07-30 | End: 2020-01-20 | Stop reason: SDUPTHER

## 2019-07-30 RX ORDER — ALLOPURINOL 300 MG/1
300 TABLET ORAL DAILY
Qty: 90 TABLET | Refills: 1 | Status: SHIPPED | OUTPATIENT
Start: 2019-07-30 | End: 2019-07-30 | Stop reason: SDUPTHER

## 2019-07-30 RX ORDER — LOSARTAN POTASSIUM 25 MG/1
25 TABLET ORAL DAILY
Qty: 90 TABLET | Refills: 1 | Status: SHIPPED | OUTPATIENT
Start: 2019-07-30 | End: 2019-10-31 | Stop reason: SDUPTHER

## 2019-07-30 RX ORDER — ATORVASTATIN CALCIUM 80 MG/1
TABLET, FILM COATED ORAL
Qty: 90 TABLET | Refills: 1 | Status: SHIPPED | OUTPATIENT
Start: 2019-07-30 | End: 2020-01-20 | Stop reason: SDUPTHER

## 2019-07-30 RX ORDER — LOSARTAN POTASSIUM 25 MG/1
25 TABLET ORAL DAILY
Qty: 90 TABLET | Refills: 1 | Status: SHIPPED | OUTPATIENT
Start: 2019-07-30 | End: 2019-07-30 | Stop reason: SDUPTHER

## 2019-07-30 ASSESSMENT — PATIENT HEALTH QUESTIONNAIRE - PHQ9
2. FEELING DOWN, DEPRESSED OR HOPELESS: 0
SUM OF ALL RESPONSES TO PHQ9 QUESTIONS 1 & 2: 0
SUM OF ALL RESPONSES TO PHQ QUESTIONS 1-9: 0
1. LITTLE INTEREST OR PLEASURE IN DOING THINGS: 0
SUM OF ALL RESPONSES TO PHQ QUESTIONS 1-9: 0

## 2019-07-30 ASSESSMENT — ENCOUNTER SYMPTOMS
RHINORRHEA: 0
COLOR CHANGE: 0
CHEST TIGHTNESS: 0
SHORTNESS OF BREATH: 1
EYE DISCHARGE: 0
BLOOD IN STOOL: 0
DIARRHEA: 0
EYE REDNESS: 0
VOMITING: 0
NAUSEA: 0
COUGH: 1

## 2019-07-31 NOTE — PROGRESS NOTES
expect this patient to improve in a reasonable and predictable time frame. Other Program Components:   (X)6 Minute Walk Test (6 MWT) at program initiation and discharge   (X)Evaluation for oxygen needs while exercising   (X)Titrate Oxygen to maintain >87% SpO2   (X)Stop Exercise or Apply Oxygen if patient desaturates <88%    Measurable Endurance Goal:    -Aerobic endurance goal to be measured in minutes. Start endurance training per patient tolerance at 1-15 minutes per exercise type, progressing to a total of 31-60 minutes using various modes of training (see Exercise Prescription on Individualized Treatment Plan 'ITP'). -Measurable Muscular Strength Goal: Starting at 1-3 lbs x 8 reps, progressing daily/weekly to 5-10 lbs x 15 reps    NOTE:  If patient is a current smoker, patient will participate in tobacco cessation program during Pulmonary Rehab.         Electronically signed by Timbo Benitez on 7/31/2019 at 12:22 PM        Physician Signature/Date/Time:

## 2019-08-02 ENCOUNTER — TELEPHONE (OUTPATIENT)
Dept: PULMONOLOGY | Age: 74
End: 2019-08-02

## 2019-08-13 ENCOUNTER — PROCEDURE VISIT (OUTPATIENT)
Dept: CARDIOLOGY CLINIC | Age: 74
End: 2019-08-13
Payer: MEDICARE

## 2019-08-13 DIAGNOSIS — Z95.0 BIVENTRICULAR CARDIAC PACEMAKER IN SITU: Primary | ICD-10-CM

## 2019-08-13 PROCEDURE — 93296 REM INTERROG EVL PM/IDS: CPT | Performed by: INTERNAL MEDICINE

## 2019-08-13 PROCEDURE — 93294 REM INTERROG EVL PM/LDLS PM: CPT | Performed by: INTERNAL MEDICINE

## 2019-08-20 ENCOUNTER — HOSPITAL ENCOUNTER (OUTPATIENT)
Dept: CARDIAC REHAB | Age: 74
Setting detail: THERAPIES SERIES
Discharge: HOME | End: 2019-08-20
Payer: MEDICARE

## 2019-08-20 VITALS — BODY MASS INDEX: 34.08 KG/M2 | WEIGHT: 217.13 LBS | HEIGHT: 67 IN

## 2019-08-20 PROCEDURE — 97150 GROUP THERAPEUTIC PROCEDURES: CPT

## 2019-08-20 RX ORDER — LISINOPRIL 10 MG/1
10 TABLET ORAL 2 TIMES DAILY
COMMUNITY
End: 2019-09-11

## 2019-08-20 NOTE — PROGRESS NOTES
Education  Plan      () Pt will have Nutrition class          () Encourage pt to continue to learn and incorporate self knowledge in to diet choices   Nutrition Intervention/ Education  Reassessment      () Pt has had class  () Pt has not had class      () Pt had questions  () Pt denies having questions Nutrition Intervention/ Education  Reassessment      () Pt has had class  () Pt has not had class      () Pt had questions  () Pt denies having questions Nutrition Intervention/ Education  Reassessment      () Pt has had class  () Pt has not had class      () Pt had questions  () Pt denies having questions Nutrition Intervention/ Education   Discharge      () Pt has had class  Date: See above  () Pt has not had class - Reason:    () Pt had questions that were answered   () Pt denies having questions             PSYCHO SOCIAL INITIAL ASSESSMENT      Depression:  () Self Reported  () In History  () S/Sx noted  (x) Denies  () On Medication  () Follows physician      (x) Give PHQ-9 Depression screening tool                  Dyspnea:  (x) Give pt [facility] SOB survey      (x) Pt gets SOB during activity and ADLs. (x) Pt has support from home for the program        () Pt does not have support for the program   PSYCHO SOCIAL  PLAN      () Attend Stress/ Depression/ Anxiety Class                Pre Rehab PHQ-9   Score: 1  1-4 Normal  5-9 Mild  10-14 Mod  15-19 Mod-Sev  >19 Severe        Pre Rehab [facility] SOB Score: 26      (x) Attend classes and attend rehab to increase strength and endurance      -Attend Psychosocial class          () Speak with the patient/ family about ability to commit to the program with undue stress/ anxiety   PSYCHO SOCIAL  RE ASSESSMENT    () Pt scored >10 on PHQ-9.   Spoke with pt privately about issues and *             () Pt recommended to contact physician for assistance                See below for ADLs        () Pt has had class  () Pt has not had class        Re assessment of support:  Good, exertion:  () Pt maintaining FiO2  () Pt tolerating lower O2 needs  () Pt needing more O2     Oxygen Goals   Discharge    () RA    With Exertion:  () Pt maintained FiO2  () Pt was found to need less O2 - notification sent to physician  () Pt was found to need more O2, notification sent to physician     Oxygen Intervention/ Education  Initial Assessment    (x) Learn / Review about O2 use, safety and travel      (x) Pt does not wear or have home oxygen    Oxygen Intervention/ Education  Plan      () Attend O2 Use, safety and travel class        () Assess for SpO2 with each exercise   Oxygen Intervention/ Education  Reassessment      () Pt has had class  () Pt has not had class      () Pt is >87%  () Pt has been found to desaturate - physician notified Oxygen Intervention/ Education  Reassessment      () Pt has had class  () Pt has not had class      () Pt is >87%  () Pt has been found to desaturate - physician notified Oxygen Intervention/ Education  Reassessment      () Pt has had class  () Pt has not had class      () Pt is >87%  () Pt has been found to desaturate - physician notified   Oxygen Intervention/ Education  Discharge      () Pt had class  Date:   () Pt did not have class      () Pt did well  () Pt needed to be put on oxygen           TOBACCO USE  INITIAL ASSESSMENT    (x) Pt currently not smoking    () Pt currently smoking        () Pt needs Tobacco Cessation counseling          Pt never smoked.    TOBACCO USE  PLAN      () N/A      Does pt want to quit:   Does pt have a quit date:    () Tobacco Cessation counseling Education if applicable        () Encouraged to contact physician for tools/ nicotine replacement etc.   TOBACCO USE  RE ASSESSMENT    () N/A      Any change in Tobacco use status () N  ()Y      () Pt attended counseling education session            () Pt has contacted physician TOBACCO USE  RE ASSESSMENT    () N/A      Any change in Tobacco use status () N  ()Y      () Pt attended counseling education session            () Pt has contacted physician TOBACCO USE  RE ASSESSMENT    () N/A      Any change in Tobacco use status () N  ()Y      () Pt attended counseling education session            () Pt has contacted physician TOBACCO USE  DISCHARGE            Current Tobacco Use Status:         () Pt attended TC counseling education session  Date:           () Pt contacted physician        NRT product/ medication  :     Tobacco Use Goal  Initial Assessment      (x) Complete Cessation or Maintain Cessation of tobacco products   Tobacco Use Goal Intervention and Education Plan    (x Encourage pt to fight the urge, call quit line, use techniques learned from friends/ class    () Attend Tobacco Cessation class if needed   Tobacco Use  Reassessment          () Pt remains tobacco free            () Pt has had TC counseling session        () Pt still smoking Tobacco Use  Reassessment          () Pt remains tobacco free            () Pt has had TC counseling session        () Pt still smoking Tobacco Use  Reassessment          () Pt remains tobacco free            () Pt has had TC counseling session        () Pt still smoking Tobacco Use  Discharge          () Pt remains tobacco free            () Pt had TC counseling  Date:  See above        () Pt still smoking - gave resources for quitting             MEDICATIONS  (Oral & Inhaled)  INITIAL ASSESSMENT    Pt just got the medications from the [place] so is just starting to take them. (x) Pt is on inhaled medications   MEDICATIONS  PLAN        -Review Rxs purpose, schedule, side effects and importance of compliance during Medication class. Also address Cpap, Vents.        MEDICATIONS  RE ASSESSMENT      Pt reports taking their meds properly  % of the time        () Pt has had class  () Pt has not had class MEDICATIONS  RE ASSESSMENT      Pt reports taking their meds properly  % of the time        () Pt has had class  () Pt has not had class MEDICATIONS  RE

## 2019-08-27 ENCOUNTER — HOSPITAL ENCOUNTER (OUTPATIENT)
Dept: CARDIAC REHAB | Age: 74
Setting detail: THERAPIES SERIES
Discharge: HOME | End: 2019-08-27
Payer: MEDICARE

## 2019-08-27 PROCEDURE — 97150 GROUP THERAPEUTIC PROCEDURES: CPT

## 2019-08-27 NOTE — PROGRESS NOTES
PULMONARY REHABILITATION / RTR PROGRAM  EDUCATION SESSION      Dionisio Orona  Session: _____    COPD ZONES - Discussed red, yellow, and green COPD zones with Dionisio. I gave pt a copy of COPD zones and told them to put it somewhere in their house where they will be reminded to evaluate \"What color am I today? \". I went over signs and symptoms for each zone and what they need to do depending on which zone they are in. CAT SCORE - I explained the purpose and procedure of the CAT score to Dionisio. A CAT score was done today and the total was 17. I told the patient that the CAT score needs to be monitored at home and explained what it means if the score increases or decreases. A CAT score copy was given to the patient. BREATHING RETRAINING - I went over pursed lip breathing with the pt. I had pt try pursed lip breathing and explained to him/her the importance and benefits of this breathing techniques.         Time spent:  34 minutes

## 2019-08-29 ENCOUNTER — HOSPITAL ENCOUNTER (OUTPATIENT)
Dept: CARDIAC REHAB | Age: 74
Setting detail: THERAPIES SERIES
Discharge: HOME | End: 2019-08-29
Payer: MEDICARE

## 2019-08-29 PROCEDURE — 97150 GROUP THERAPEUTIC PROCEDURES: CPT

## 2019-09-03 ENCOUNTER — HOSPITAL ENCOUNTER (OUTPATIENT)
Dept: CARDIAC REHAB | Age: 74
Setting detail: THERAPIES SERIES
End: 2019-09-03
Payer: MEDICARE

## 2019-09-05 ENCOUNTER — HOSPITAL ENCOUNTER (OUTPATIENT)
Dept: CARDIAC REHAB | Age: 74
Setting detail: THERAPIES SERIES
Discharge: HOME | End: 2019-09-05
Payer: MEDICARE

## 2019-09-05 PROCEDURE — 97150 GROUP THERAPEUTIC PROCEDURES: CPT

## 2019-09-10 ENCOUNTER — HOSPITAL ENCOUNTER (OUTPATIENT)
Dept: CARDIAC REHAB | Age: 74
Setting detail: THERAPIES SERIES
End: 2019-09-10
Payer: MEDICARE

## 2019-09-11 ENCOUNTER — OFFICE VISIT (OUTPATIENT)
Dept: PULMONOLOGY | Age: 74
End: 2019-09-11
Payer: MEDICARE

## 2019-09-11 VITALS
OXYGEN SATURATION: 96 % | HEIGHT: 67 IN | BODY MASS INDEX: 34.84 KG/M2 | DIASTOLIC BLOOD PRESSURE: 76 MMHG | HEART RATE: 63 BPM | SYSTOLIC BLOOD PRESSURE: 137 MMHG | TEMPERATURE: 97.1 F | WEIGHT: 222 LBS

## 2019-09-11 DIAGNOSIS — J44.9 STAGE 2 MODERATE COPD BY GOLD CLASSIFICATION (HCC): Primary | ICD-10-CM

## 2019-09-11 DIAGNOSIS — E66.9 OBESITY (BMI 30-39.9): ICD-10-CM

## 2019-09-11 PROCEDURE — G8427 DOCREV CUR MEDS BY ELIG CLIN: HCPCS | Performed by: INTERNAL MEDICINE

## 2019-09-11 PROCEDURE — 3023F SPIROM DOC REV: CPT | Performed by: INTERNAL MEDICINE

## 2019-09-11 PROCEDURE — 1123F ACP DISCUSS/DSCN MKR DOCD: CPT | Performed by: INTERNAL MEDICINE

## 2019-09-11 PROCEDURE — 4040F PNEUMOC VAC/ADMIN/RCVD: CPT | Performed by: INTERNAL MEDICINE

## 2019-09-11 PROCEDURE — G8598 ASA/ANTIPLAT THER USED: HCPCS | Performed by: INTERNAL MEDICINE

## 2019-09-11 PROCEDURE — G8417 CALC BMI ABV UP PARAM F/U: HCPCS | Performed by: INTERNAL MEDICINE

## 2019-09-11 PROCEDURE — G8926 SPIRO NO PERF OR DOC: HCPCS | Performed by: INTERNAL MEDICINE

## 2019-09-11 PROCEDURE — 99213 OFFICE O/P EST LOW 20 MIN: CPT | Performed by: INTERNAL MEDICINE

## 2019-09-11 PROCEDURE — 3017F COLORECTAL CA SCREEN DOC REV: CPT | Performed by: INTERNAL MEDICINE

## 2019-09-11 PROCEDURE — 1036F TOBACCO NON-USER: CPT | Performed by: INTERNAL MEDICINE

## 2019-09-11 ASSESSMENT — ENCOUNTER SYMPTOMS
STRIDOR: 0
BACK PAIN: 0
CHEST TIGHTNESS: 0
WHEEZING: 0
SHORTNESS OF BREATH: 1
EYES NEGATIVE: 1
VOICE CHANGE: 0
TROUBLE SWALLOWING: 0
COLOR CHANGE: 0
COUGH: 0
GASTROINTESTINAL NEGATIVE: 1
APNEA: 0
CHOKING: 1

## 2019-09-11 NOTE — PROGRESS NOTES
present. No tracheal deviation present. No thyromegaly present. Cardiovascular: Normal rate and intact distal pulses. Exam reveals no gallop and no friction rub. No murmur heard. Extrasystoles  Sternotomy   Pulmonary/Chest: Breath sounds normal. No stridor. No respiratory distress. He has no wheezes. He has no rales. He exhibits no tenderness. Abdominal: Soft. Bowel sounds are normal. He exhibits no distension and no mass. There is no tenderness. There is no rebound and no guarding. No hernia. Musculoskeletal: He exhibits no edema or deformity. Lymphadenopathy:     He has no cervical adenopathy. Neurological: He is alert and oriented to person, place, and time. He displays normal reflexes. No cranial nerve deficit or sensory deficit. He exhibits normal muscle tone. Coordination normal.   Skin: Skin is warm and dry. He is not diaphoretic. Nursing note and vitals reviewed. PFTs:                Six Minute Walk Test  yamila Levi 1945    Six minute walk test done in my office today by my medical assistant Gracie Said. Dionisio's oxygen saturation at rest on room air was 96%. His oxygen saturation dropped to 94% on room air with exertion. Supplemental oxygen was not needed  Dionisio walked more than 1000 ft during 6 minute walk quite comfortably. A1A:                  Assessment:       Diagnosis Orders   1. Stage 2 moderate COPD by GOLD classification ([address]     2. Obesity (BMI 30-39.9)     3. Ischemic CMP  4.       Obesity BMI 34        Plan:      Continue current plan  Offered again PSG and declined  Complete pulm rehab  Needs Flu shot--will get at [place]  No need for screening CT  RTC 6 mos, no test

## 2019-09-12 ENCOUNTER — HOSPITAL ENCOUNTER (OUTPATIENT)
Dept: CARDIAC REHAB | Age: 74
Setting detail: THERAPIES SERIES
End: 2019-09-12
Payer: MEDICARE

## 2019-09-17 ENCOUNTER — HOSPITAL ENCOUNTER (OUTPATIENT)
Dept: CARDIAC REHAB | Age: 74
Setting detail: THERAPIES SERIES
Discharge: HOME | End: 2019-09-17
Payer: MEDICARE

## 2019-09-17 NOTE — PROGRESS NOTES
Oxygen Intervention/ Education  Discharge      () Pt had class  Date:   () Pt did not have class      () Pt did well  () Pt needed to be put on oxygen       TOBACCO USE  INITIAL ASSESSMENT    (x) Pt currently not smoking    () Pt currently smoking        () Pt needs Tobacco Cessation counseling          Pt never smoked. TOBACCO USE  DISCHARGE            Current Tobacco Use Status:         () Pt attended TC counseling education session  Date:           () Pt contacted physician        NRT product/ medication  :     Tobacco Use Goal  Initial Assessment      (x) Complete Cessation or Maintain Cessation of tobacco products   Tobacco Use  Discharge          () Pt remains tobacco free            () Pt had TC counseling  Date:  See above        () Pt still smoking - gave resources for quitting         MEDICATIONS  (Oral & Inhaled)  INITIAL ASSESSMENT    Pt just got the medications from the [place] so is just starting to take them.     (x) Pt is on inhaled medications   MEDICATIONS  DISCHARGE        Pt reports taking their meds properly  % of the time        ()Pt had med class  Date:  () Pt has not had class       Pt has:  (x) Metered Dose Inhaler  () Dry Powder Inhaler  () Nebulizer   () Pt had class  Date: See above  () Pt has not had class   Medication Goals  Initial Assessment        (x) Take meds properly 100% of the time    (x) Learn about / Review Rxs, and devices Medication Goals  Discharge          % proper med usage see above      () Pt had class  Date: See above  () Pt has not had class         ADL  INITIAL ASSESSMENT      (x) Impaired ADL ability  () Need for Assist Devices  (x) Generalized weakness, low functional capacity  (x) Stairs in house               ADL  DISCHARGE        () Pt showed strength and endurance gains  () Pt did not progress      () Pt doing recommended home activity  () Pt not doing home activitiy         ADL Goals   Initial Assessment      (x) Decrease SOB with ADLs              (x)

## 2019-09-19 ENCOUNTER — APPOINTMENT (OUTPATIENT)
Dept: CARDIAC REHAB | Age: 74
End: 2019-09-19
Payer: MEDICARE

## 2019-09-24 ENCOUNTER — APPOINTMENT (OUTPATIENT)
Dept: CARDIAC REHAB | Age: 74
End: 2019-09-24
Payer: MEDICARE

## 2019-09-26 ENCOUNTER — APPOINTMENT (OUTPATIENT)
Dept: CARDIAC REHAB | Age: 74
End: 2019-09-26
Payer: MEDICARE

## 2019-10-31 ENCOUNTER — OFFICE VISIT (OUTPATIENT)
Dept: CARDIOLOGY CLINIC | Age: 74
End: 2019-10-31
Payer: MEDICARE

## 2019-10-31 ENCOUNTER — TELEPHONE (OUTPATIENT)
Dept: CARDIOLOGY CLINIC | Age: 74
End: 2019-10-31

## 2019-10-31 VITALS
HEIGHT: 68 IN | HEART RATE: 76 BPM | SYSTOLIC BLOOD PRESSURE: 160 MMHG | WEIGHT: 222.6 LBS | DIASTOLIC BLOOD PRESSURE: 84 MMHG | BODY MASS INDEX: 33.74 KG/M2

## 2019-10-31 DIAGNOSIS — I25.810 CORONARY ARTERY DISEASE INVOLVING CORONARY BYPASS GRAFT OF NATIVE HEART WITHOUT ANGINA PECTORIS: Primary | ICD-10-CM

## 2019-10-31 DIAGNOSIS — Z95.2 S/P AVR: ICD-10-CM

## 2019-10-31 DIAGNOSIS — I10 ESSENTIAL HYPERTENSION: ICD-10-CM

## 2019-10-31 PROCEDURE — G8427 DOCREV CUR MEDS BY ELIG CLIN: HCPCS | Performed by: NUCLEAR MEDICINE

## 2019-10-31 PROCEDURE — 4040F PNEUMOC VAC/ADMIN/RCVD: CPT | Performed by: NUCLEAR MEDICINE

## 2019-10-31 PROCEDURE — G8598 ASA/ANTIPLAT THER USED: HCPCS | Performed by: NUCLEAR MEDICINE

## 2019-10-31 PROCEDURE — G8417 CALC BMI ABV UP PARAM F/U: HCPCS | Performed by: NUCLEAR MEDICINE

## 2019-10-31 PROCEDURE — 3017F COLORECTAL CA SCREEN DOC REV: CPT | Performed by: NUCLEAR MEDICINE

## 2019-10-31 PROCEDURE — 1123F ACP DISCUSS/DSCN MKR DOCD: CPT | Performed by: NUCLEAR MEDICINE

## 2019-10-31 PROCEDURE — 99213 OFFICE O/P EST LOW 20 MIN: CPT | Performed by: NUCLEAR MEDICINE

## 2019-10-31 PROCEDURE — 1036F TOBACCO NON-USER: CPT | Performed by: NUCLEAR MEDICINE

## 2019-10-31 PROCEDURE — G8484 FLU IMMUNIZE NO ADMIN: HCPCS | Performed by: NUCLEAR MEDICINE

## 2019-10-31 RX ORDER — LOSARTAN POTASSIUM 50 MG/1
50 TABLET ORAL DAILY
Qty: 90 TABLET | Refills: 3 | Status: SHIPPED | OUTPATIENT
Start: 2019-10-31 | End: 2019-10-31 | Stop reason: DRUGHIGH

## 2019-10-31 RX ORDER — LOSARTAN POTASSIUM 25 MG/1
25 TABLET ORAL DAILY
COMMUNITY
End: 2020-01-20 | Stop reason: SDUPTHER

## 2019-11-06 ENCOUNTER — HOSPITAL ENCOUNTER (OUTPATIENT)
Dept: NON INVASIVE DIAGNOSTICS | Age: 74
Discharge: HOME | End: 2019-11-06
Payer: MEDICARE

## 2019-11-06 DIAGNOSIS — I25.810 CORONARY ARTERY DISEASE INVOLVING CORONARY BYPASS GRAFT OF NATIVE HEART WITHOUT ANGINA PECTORIS: ICD-10-CM

## 2019-11-06 DIAGNOSIS — I10 ESSENTIAL HYPERTENSION: ICD-10-CM

## 2019-11-06 DIAGNOSIS — Z95.2 S/P AVR: ICD-10-CM

## 2019-11-06 LAB
LV EF: 53 %
LVEF MODALITY: NORMAL

## 2019-11-06 PROCEDURE — 93306 TTE W/DOPPLER COMPLETE: CPT

## 2019-11-14 ENCOUNTER — PROCEDURE VISIT (OUTPATIENT)
Dept: CARDIOLOGY CLINIC | Age: 74
End: 2019-11-14
Payer: MEDICARE

## 2019-11-14 DIAGNOSIS — Z95.0 BIVENTRICULAR CARDIAC PACEMAKER IN SITU: Primary | ICD-10-CM

## 2019-11-14 PROCEDURE — 93294 REM INTERROG EVL PM/LDLS PM: CPT | Performed by: INTERNAL MEDICINE

## 2019-11-14 PROCEDURE — 93296 REM INTERROG EVL PM/IDS: CPT | Performed by: INTERNAL MEDICINE

## 2020-01-01 ENCOUNTER — TELEPHONE (OUTPATIENT)
Dept: FAMILY MEDICINE CLINIC | Age: 75
End: 2020-01-01

## 2020-01-01 ENCOUNTER — OFFICE VISIT (OUTPATIENT)
Dept: FAMILY MEDICINE CLINIC | Age: 75
End: 2020-01-01
Payer: MEDICARE

## 2020-01-01 ENCOUNTER — PROCEDURE VISIT (OUTPATIENT)
Dept: CARDIOLOGY CLINIC | Age: 75
End: 2020-01-01
Payer: MEDICARE

## 2020-01-01 ENCOUNTER — HOSPITAL ENCOUNTER (OUTPATIENT)
Dept: INTERVENTIONAL RADIOLOGY/VASCULAR | Age: 75
Discharge: HOME | End: 2020-10-05
Payer: MEDICARE

## 2020-01-01 ENCOUNTER — OFFICE VISIT (OUTPATIENT)
Dept: CARDIOLOGY CLINIC | Age: 75
End: 2020-01-01
Payer: MEDICARE

## 2020-01-01 VITALS
SYSTOLIC BLOOD PRESSURE: 138 MMHG | HEIGHT: 68 IN | DIASTOLIC BLOOD PRESSURE: 80 MMHG | HEART RATE: 70 BPM | BODY MASS INDEX: 32.74 KG/M2 | WEIGHT: 216 LBS

## 2020-01-01 VITALS
DIASTOLIC BLOOD PRESSURE: 82 MMHG | TEMPERATURE: 97.8 F | BODY MASS INDEX: 33.3 KG/M2 | RESPIRATION RATE: 14 BRPM | SYSTOLIC BLOOD PRESSURE: 144 MMHG | WEIGHT: 219 LBS | HEART RATE: 84 BPM

## 2020-01-01 LAB — HBA1C MFR BLD: 8 %

## 2020-01-01 PROCEDURE — 1036F TOBACCO NON-USER: CPT | Performed by: FAMILY MEDICINE

## 2020-01-01 PROCEDURE — 93294 REM INTERROG EVL PM/LDLS PM: CPT | Performed by: NUCLEAR MEDICINE

## 2020-01-01 PROCEDURE — 1123F ACP DISCUSS/DSCN MKR DOCD: CPT | Performed by: NUCLEAR MEDICINE

## 2020-01-01 PROCEDURE — G8427 DOCREV CUR MEDS BY ELIG CLIN: HCPCS | Performed by: FAMILY MEDICINE

## 2020-01-01 PROCEDURE — 1036F TOBACCO NON-USER: CPT | Performed by: NUCLEAR MEDICINE

## 2020-01-01 PROCEDURE — G8484 FLU IMMUNIZE NO ADMIN: HCPCS | Performed by: NUCLEAR MEDICINE

## 2020-01-01 PROCEDURE — 93000 ELECTROCARDIOGRAM COMPLETE: CPT | Performed by: NUCLEAR MEDICINE

## 2020-01-01 PROCEDURE — 93296 REM INTERROG EVL PM/IDS: CPT | Performed by: NUCLEAR MEDICINE

## 2020-01-01 PROCEDURE — 83036 HEMOGLOBIN GLYCOSYLATED A1C: CPT | Performed by: FAMILY MEDICINE

## 2020-01-01 PROCEDURE — 1123F ACP DISCUSS/DSCN MKR DOCD: CPT | Performed by: FAMILY MEDICINE

## 2020-01-01 PROCEDURE — 3017F COLORECTAL CA SCREEN DOC REV: CPT | Performed by: NUCLEAR MEDICINE

## 2020-01-01 PROCEDURE — 4040F PNEUMOC VAC/ADMIN/RCVD: CPT | Performed by: FAMILY MEDICINE

## 2020-01-01 PROCEDURE — 93880 EXTRACRANIAL BILAT STUDY: CPT

## 2020-01-01 PROCEDURE — 99213 OFFICE O/P EST LOW 20 MIN: CPT | Performed by: NUCLEAR MEDICINE

## 2020-01-01 PROCEDURE — G8427 DOCREV CUR MEDS BY ELIG CLIN: HCPCS | Performed by: NUCLEAR MEDICINE

## 2020-01-01 PROCEDURE — 3052F HG A1C>EQUAL 8.0%<EQUAL 9.0%: CPT | Performed by: FAMILY MEDICINE

## 2020-01-01 PROCEDURE — 4040F PNEUMOC VAC/ADMIN/RCVD: CPT | Performed by: NUCLEAR MEDICINE

## 2020-01-01 PROCEDURE — G8417 CALC BMI ABV UP PARAM F/U: HCPCS | Performed by: NUCLEAR MEDICINE

## 2020-01-01 PROCEDURE — 2022F DILAT RTA XM EVC RTNOPTHY: CPT | Performed by: FAMILY MEDICINE

## 2020-01-01 PROCEDURE — G8417 CALC BMI ABV UP PARAM F/U: HCPCS | Performed by: FAMILY MEDICINE

## 2020-01-01 PROCEDURE — 3017F COLORECTAL CA SCREEN DOC REV: CPT | Performed by: FAMILY MEDICINE

## 2020-01-01 PROCEDURE — 99214 OFFICE O/P EST MOD 30 MIN: CPT | Performed by: FAMILY MEDICINE

## 2020-01-01 RX ORDER — NITROGLYCERIN 0.4 MG/1
0.4 TABLET SUBLINGUAL EVERY 5 MIN PRN
Qty: 25 TABLET | Refills: 3 | Status: SHIPPED | OUTPATIENT
Start: 2020-01-01

## 2020-01-01 RX ORDER — WARFARIN SODIUM 2 MG/1
TABLET ORAL
Qty: 270 TABLET | Refills: 1 | Status: ON HOLD | OUTPATIENT
Start: 2020-01-01 | End: 2021-01-01

## 2020-01-01 RX ORDER — WARFARIN SODIUM 5 MG/1
TABLET ORAL
Qty: 90 TABLET | Refills: 0 | Status: SHIPPED | OUTPATIENT
Start: 2020-01-01 | End: 2021-01-01 | Stop reason: SDUPTHER

## 2020-01-01 RX ORDER — LOSARTAN POTASSIUM 25 MG/1
25 TABLET ORAL DAILY
Qty: 90 TABLET | Refills: 1 | Status: SHIPPED | OUTPATIENT
Start: 2020-01-01 | End: 2021-01-01 | Stop reason: SDUPTHER

## 2020-01-01 RX ORDER — ATORVASTATIN CALCIUM 80 MG/1
TABLET, FILM COATED ORAL
Qty: 90 TABLET | Refills: 0 | OUTPATIENT
Start: 2020-01-01

## 2020-01-01 RX ORDER — TIOTROPIUM BROMIDE AND OLODATEROL 3.124; 2.736 UG/1; UG/1
SPRAY, METERED RESPIRATORY (INHALATION)
Qty: 12 G | Refills: 3 | Status: SHIPPED | OUTPATIENT
Start: 2020-01-01 | End: 2021-01-01 | Stop reason: SDUPTHER

## 2020-01-01 RX ORDER — HYDROCHLOROTHIAZIDE 25 MG/1
25 TABLET ORAL EVERY MORNING
Qty: 90 TABLET | Refills: 2 | Status: SHIPPED | OUTPATIENT
Start: 2020-01-01 | End: 2021-01-01 | Stop reason: SDUPTHER

## 2020-01-01 RX ORDER — ALLOPURINOL 300 MG/1
300 TABLET ORAL DAILY
Qty: 90 TABLET | Refills: 1 | Status: SHIPPED | OUTPATIENT
Start: 2020-01-01 | End: 2021-01-01 | Stop reason: SDUPTHER

## 2020-01-01 RX ORDER — WARFARIN SODIUM 2 MG/1
TABLET ORAL
Qty: 270 TABLET | Refills: 1 | Status: CANCELLED | OUTPATIENT
Start: 2020-01-01

## 2020-01-01 RX ORDER — WARFARIN SODIUM 5 MG/1
TABLET ORAL
Qty: 90 TABLET | Refills: 1 | Status: CANCELLED | OUTPATIENT
Start: 2020-01-01

## 2020-01-01 RX ORDER — METOPROLOL TARTRATE 50 MG/1
50 TABLET, FILM COATED ORAL 2 TIMES DAILY
Qty: 180 TABLET | Refills: 1 | Status: SHIPPED | OUTPATIENT
Start: 2020-01-01 | End: 2021-01-01 | Stop reason: SDUPTHER

## 2020-01-01 RX ORDER — ATORVASTATIN CALCIUM 80 MG/1
TABLET, FILM COATED ORAL
Qty: 90 TABLET | Refills: 1 | Status: SHIPPED | OUTPATIENT
Start: 2020-01-01 | End: 2021-01-01 | Stop reason: SDUPTHER

## 2020-01-01 RX ORDER — ATORVASTATIN CALCIUM 80 MG/1
TABLET, FILM COATED ORAL
Qty: 90 TABLET | Refills: 1 | Status: SHIPPED | OUTPATIENT
Start: 2020-01-01 | End: 2020-01-01

## 2020-01-01 RX ORDER — ALBUTEROL SULFATE 90 UG/1
2 AEROSOL, METERED RESPIRATORY (INHALATION) EVERY 6 HOURS PRN
Qty: 1 INHALER | Refills: 5 | Status: SHIPPED | OUTPATIENT
Start: 2020-01-01 | End: 2021-01-01 | Stop reason: SDUPTHER

## 2020-01-01 ASSESSMENT — ENCOUNTER SYMPTOMS
EYE DISCHARGE: 0
NAUSEA: 0
BLOOD IN STOOL: 0
VOMITING: 0
DIARRHEA: 0
RHINORRHEA: 0
EYE REDNESS: 0
SHORTNESS OF BREATH: 0
CHEST TIGHTNESS: 0
COLOR CHANGE: 0

## 2020-01-19 ASSESSMENT — ENCOUNTER SYMPTOMS
COUGH: 1
CHEST TIGHTNESS: 0
RHINORRHEA: 0
DIARRHEA: 0
EYE DISCHARGE: 0
EYE REDNESS: 0
BLOOD IN STOOL: 0
NAUSEA: 0
COLOR CHANGE: 0
SHORTNESS OF BREATH: 1
VOMITING: 0

## 2020-01-19 NOTE — PROGRESS NOTES
Constitutional: Negative for chills and fever. HENT: Negative for congestion and rhinorrhea. Eyes: Negative for discharge and redness. Respiratory: Positive for cough and shortness of breath (GAN). Negative for chest tightness. Cardiovascular: Negative for chest pain, palpitations and leg swelling. Gastrointestinal: Negative for blood in stool, diarrhea, nausea and vomiting. Endocrine: Negative for polydipsia, polyphagia and polyuria. Genitourinary: Negative for difficulty urinating and hematuria. Musculoskeletal: Positive for arthralgias, joint swelling and myalgias. Skin: Negative for color change and pallor. Neurological: Negative for headaches. Hematological: Bruises/bleeds easily. Psychiatric/Behavioral: Negative for self-injury and suicidal ideas. Objective:     /82 (Site: Left Upper Arm, Position: Sitting, Cuff Size: Large Adult)   Pulse 76   Ht 5' 8\" (1.727 m)   Wt 219 lb (99.3 kg)   BMI 33.30 kg/m²     Physical Exam  Vitals signs reviewed. Constitutional:       General: He is not in acute distress. Appearance: He is well-developed. HENT:      Head: Normocephalic and atraumatic. Nose: Nose normal.   Eyes:      Conjunctiva/sclera: Conjunctivae normal.   Neck:      Musculoskeletal: Normal range of motion and neck supple. Cardiovascular:      Rate and Rhythm: Normal rate and regular rhythm. Pulmonary:      Effort: Pulmonary effort is normal. No respiratory distress. Breath sounds: Normal breath sounds. No wheezing. Abdominal:      General: Bowel sounds are normal.      Palpations: Abdomen is soft. Skin:     General: Skin is warm and dry. Findings: No rash. Comments: Scar on right forearm from compartment syndrome. Neurological:      Mental Status: He is alert and oriented to person, place, and time.    Psychiatric:         Behavior: Behavior normal.       Assessment/Plan:     Donovan White was seen today for medicare awv and 6 month follow-up. Diagnoses and all orders for this visit:    Essential hypertension        -     Blood pressure is controlled so will continue on current medications. Pure hypercholesterolemia        -     A healthy diet and increased physical activity encouraged. Type 2 diabetes mellitus without complication, without long-term current use of insulin (HCC)  -     HbA1c has increased to 7.8%. Patient is reluctant to start on diabetes medications, but if HbA1c continues to increase, will need to do so. A handout on a healthy low-carb diet was provided. -     POCT microalbumin  -     POCT glycosylated hemoglobin (Hb A1C)    Screening for colon cancer  -     POCT Fecal Immunochemical Test (FIT); Future      Return in 6 months (on 7/20/2020) for T2DM, hypertension, hyperlipidemia.     Electronically signed by Hari Mcwilliams MD on 1/20/2020 at 4:06 PM

## 2020-01-20 ENCOUNTER — OFFICE VISIT (OUTPATIENT)
Dept: FAMILY MEDICINE CLINIC | Age: 75
End: 2020-01-20
Payer: MEDICARE

## 2020-01-20 VITALS
SYSTOLIC BLOOD PRESSURE: 130 MMHG | HEIGHT: 68 IN | BODY MASS INDEX: 33.19 KG/M2 | DIASTOLIC BLOOD PRESSURE: 82 MMHG | WEIGHT: 219 LBS | HEART RATE: 76 BPM

## 2020-01-20 LAB
CREATININE URINE POCT: 300
HBA1C MFR BLD: 7.8 %
MICROALBUMIN/CREAT 24H UR: 30 MG/G{CREAT}
MICROALBUMIN/CREAT UR-RTO: <30

## 2020-01-20 PROCEDURE — 83036 HEMOGLOBIN GLYCOSYLATED A1C: CPT | Performed by: FAMILY MEDICINE

## 2020-01-20 PROCEDURE — 4040F PNEUMOC VAC/ADMIN/RCVD: CPT | Performed by: FAMILY MEDICINE

## 2020-01-20 PROCEDURE — 1123F ACP DISCUSS/DSCN MKR DOCD: CPT | Performed by: FAMILY MEDICINE

## 2020-01-20 PROCEDURE — 99213 OFFICE O/P EST LOW 20 MIN: CPT | Performed by: FAMILY MEDICINE

## 2020-01-20 PROCEDURE — 3017F COLORECTAL CA SCREEN DOC REV: CPT | Performed by: FAMILY MEDICINE

## 2020-01-20 PROCEDURE — G0439 PPPS, SUBSEQ VISIT: HCPCS | Performed by: FAMILY MEDICINE

## 2020-01-20 PROCEDURE — G8484 FLU IMMUNIZE NO ADMIN: HCPCS | Performed by: FAMILY MEDICINE

## 2020-01-20 PROCEDURE — 82044 UR ALBUMIN SEMIQUANTITATIVE: CPT | Performed by: FAMILY MEDICINE

## 2020-01-20 PROCEDURE — 3046F HEMOGLOBIN A1C LEVEL >9.0%: CPT | Performed by: FAMILY MEDICINE

## 2020-01-20 RX ORDER — METOPROLOL TARTRATE 50 MG/1
50 TABLET, FILM COATED ORAL 2 TIMES DAILY
Qty: 180 TABLET | Refills: 1 | Status: SHIPPED | OUTPATIENT
Start: 2020-01-20 | End: 2020-01-01 | Stop reason: SDUPTHER

## 2020-01-20 RX ORDER — HYDROCHLOROTHIAZIDE 25 MG/1
25 TABLET ORAL EVERY MORNING
Qty: 90 TABLET | Refills: 2 | Status: SHIPPED | OUTPATIENT
Start: 2020-01-20 | End: 2020-01-01 | Stop reason: SDUPTHER

## 2020-01-20 RX ORDER — LOSARTAN POTASSIUM 25 MG/1
25 TABLET ORAL DAILY
Qty: 90 TABLET | Refills: 1 | Status: SHIPPED | OUTPATIENT
Start: 2020-01-20 | End: 2020-01-01 | Stop reason: SDUPTHER

## 2020-01-20 RX ORDER — WARFARIN SODIUM 5 MG/1
TABLET ORAL
Qty: 90 TABLET | Refills: 1 | Status: SHIPPED | OUTPATIENT
Start: 2020-01-20 | End: 2020-01-01 | Stop reason: SDUPTHER

## 2020-01-20 RX ORDER — WARFARIN SODIUM 2 MG/1
TABLET ORAL
Qty: 270 TABLET | Refills: 1 | Status: SHIPPED | OUTPATIENT
Start: 2020-01-20 | End: 2020-01-01 | Stop reason: SDUPTHER

## 2020-01-20 RX ORDER — ATORVASTATIN CALCIUM 80 MG/1
TABLET, FILM COATED ORAL
Qty: 90 TABLET | Refills: 1 | Status: SHIPPED | OUTPATIENT
Start: 2020-01-20 | End: 2020-04-04 | Stop reason: SDUPTHER

## 2020-01-20 RX ORDER — ALLOPURINOL 300 MG/1
300 TABLET ORAL DAILY
Qty: 90 TABLET | Refills: 1 | Status: SHIPPED | OUTPATIENT
Start: 2020-01-20 | End: 2020-01-01 | Stop reason: SDUPTHER

## 2020-01-20 RX ORDER — ALBUTEROL SULFATE 90 UG/1
2 AEROSOL, METERED RESPIRATORY (INHALATION) EVERY 6 HOURS PRN
Qty: 1 INHALER | Refills: 5 | Status: SHIPPED | OUTPATIENT
Start: 2020-01-20 | End: 2020-01-01 | Stop reason: SDUPTHER

## 2020-01-20 ASSESSMENT — PATIENT HEALTH QUESTIONNAIRE - PHQ9
SUM OF ALL RESPONSES TO PHQ QUESTIONS 1-9: 0
SUM OF ALL RESPONSES TO PHQ QUESTIONS 1-9: 0

## 2020-01-20 ASSESSMENT — LIFESTYLE VARIABLES: HOW OFTEN DO YOU HAVE A DRINK CONTAINING ALCOHOL: 0

## 2020-01-20 NOTE — PROGRESS NOTES
[address] [place] [address], [facility]  Phone:  [phone]  Fax:  [phone]        Medicare Annual Wellness Visit    Name: Violette Nunez Date: 2020   MRN: [id number] Sex: Male   Age: 69 y.o. Ethnicity: Non-/Non    : 1945 Race: Shilpa Perez is here for Medicare AWV and 6 Month Follow-Up    Screenings for behavioral, psychosocial and functional/safety risks, and cognitive dysfunction are all negative except as indicated below. These results, as well as other patient data from the [address] form, are documented in Flowsheets linked to this Encounter. Allergies   Allergen Reactions    Erythromycin Hives    Sulfa Antibiotics Other (See Comments)     PT DOESN'T REMEMBER    Vasotec [Enalapril] Other (See Comments)     cough       Prior to Visit Medications    Medication Sig Taking?  Authorizing Provider   losartan (COZAAR) 25 MG tablet Take 25 mg by mouth daily Yes Historical Provider, MD   Tiotropium Bromide-Olodaterol (STIOLTO RESPIMAT) 2.5-2.5 MCG/ACT AERS 2 inhalations once daily Yes Anjana Monroy MD   metoprolol tartrate (LOPRESSOR) 50 MG tablet Take 1 tablet by mouth 2 times daily Yes Erik Negro MD   allopurinol (ZYLOPRIM) 300 MG tablet Take 1 tablet by mouth daily Yes Erik Negro MD   warfarin (COUMADIN) 5 MG tablet AS DIRECTED PER DR EATON Yes Erik Negro MD   warfarin (COUMADIN) 2 MG tablet AS DIRECTED PER DR EATON Yes Erik Negro MD   atorvastatin (LIPITOR) 80 MG tablet TAKE 1 TABLET BY MOUTH ONE TIME A DAY Yes Erik Negro MD   hydrochlorothiazide (HYDRODIURIL) 25 MG tablet Take 1 tablet by mouth every morning Yes Erik Negro MD   albuterol sulfate HFA (PROVENTIL HFA) 108 (90 Base) MCG/ACT inhaler Inhale 2 puffs into the lungs every 6 hours as needed for Wheezing Yes Anjana Monroy MD   nitroGLYCERIN (NITROSTAT) 0.4 MG SL tablet Place 1 tablet under the tongue every 5 minutes as needed for Chest pain Yes José Manuel Drake APRN - CNP   triamcinolone (KENALOG) 0.1 % cream Apply topically as needed Apply topically 2 times daily. Yes Historical Provider, MD   aspirin 81 MG EC tablet Take 81 mg by mouth daily. Yes Historical Provider, MD       Past Medical History:   Diagnosis Date    CAD (coronary artery disease)     Clotting disorder ([address]     anemic    Compartment syndrome ([address]     Right arm    Heart murmur     Hyperlipidemia     Hypertension     Ventricular hypertrophy     left       Past Surgical History:   Procedure Laterality Date    AORTIC VALVE REPLACEMENT      sept 2008    ARM SURGERY Right     Compartment syndrome - may 2010    CARPAL TUNNEL RELEASE Bilateral     CORONARY ARTERY BYPASS GRAFT      sept 2008    DIAGNOSTIC CARDIAC CATH LAB PROCEDURE      ROTATOR CUFF REPAIR      with 3 pins placed        Family History   Problem Relation Age of Onset    Alzheimer's Disease Mother     Cancer Father     Heart Disease Brother     Hypertension Brother     Stroke Brother        CareTeam (Including outside providers/suppliers regularly involved in providing care):   Patient Care Team:  Karina Yang MD as PCP - General (Family Medicine)  Karina Yang MD as PCP - [facility] Empaneled Provider  Carol Guzmán MD as Physician (Cardiology)  Valentine Brice RN as Care Transitions Nurse  Angie House RN as Care Transitions Nurse    Wt Readings from Last 3 Encounters:   01/20/20 219 lb (99.3 kg)   10/31/19 222 lb 9.6 oz (101 kg)   09/11/19 222 lb (100.7 kg)     Vitals:    01/20/20 1530   Weight: 219 lb (99.3 kg)   Height: 5' 8\" (1.727 m)     Body mass index is 33.3 kg/m². Based upon direct observation of the patient, evaluation of cognition reveals recent and remote memory intact. Patient's complete Health Risk Assessment and screening values have been reviewed and are found in Flowsheets.  The following problems were reviewed today and where indicated follow up appointments were made and/or referrals ordered. Positive Risk Factor Screenings with Interventions:     Health Habits/Nutrition:  Health Habits/Nutrition  Do you exercise for at least 20 minutes 2-3 times per week?: Yes  Have you lost any weight without trying in the past 3 months?: No  Do you eat fewer than 2 meals per day?: No  Have you seen a dentist within the past year?: (!) No  Body mass index is 33.3 kg/m². Health Habits/Nutrition Interventions:  · Dental exam overdue:  patient encouraged to make appointment with his/her dentist    Personalized Preventive Plan   Current Health Maintenance Status  Immunization History   Administered Date(s) Administered    Hepatitis A 02/26/1998, 01/25/1999    Influenza Virus Vaccine 11/26/2019    Influenza, High Dose (Fluzone 65 yrs and older) 11/13/2018    Pneumococcal Conjugate 13-valent ([id number]) 01/29/2018    Pneumococcal Polysaccharide ([id number]) 02/04/2019        Health Maintenance   Topic Date Due    Diabetic foot exam  06/27/1956    Diabetic retinal exam  06/27/1956    DTaP/Tdap/Td vaccine (1 - Tdap) 06/27/1957    Diabetic microalbuminuria test  06/27/1964    Shingles Vaccine (1 of 2) 06/27/1996    Annual Wellness Visit (AWV)  05/29/2019    Colon Cancer Screen FIT/FOBT  02/04/2020    Lipid screen  04/06/2020    Potassium monitoring  06/03/2020    Creatinine monitoring  06/03/2020    A1C test (Diabetic or Prediabetic)  07/30/2020    Flu vaccine  Completed    Pneumococcal 65+ years Vaccine  Completed    Hepatitis C screen  Completed     Recommendations for Preventive Services Due: see orders and patient instructions/AVS.  . Recommended screening schedule for the next 5-10 years is provided to the patient in written form: see Patient Instructions/AVS.    Teofilo Johnson was seen today for medicare awv and 6 month follow-up.     Diagnoses and all orders for this visit:    Encounter for Medicare annual wellness exam    Essential hypertension    Pure hypercholesterolemia    ASCVD (arteriosclerotic cardiovascular disease)    Type 2 diabetes mellitus without complication, without long-term current use of insulin (HCC)  -     POCT microalbumin  -     POCT glycosylated hemoglobin (Hb A1C)    Screening for colon cancer  -     POCT Fecal Immunochemical Test (FIT); Future        Electronically signed by Rasheeda Ralph MD on 1/20/2020.

## 2020-01-20 NOTE — PATIENT INSTRUCTIONS
Personalized Preventive Plan for Tomeka Lu - 1/20/2020  Medicare offers a range of preventive health benefits. Some of the tests and screenings are paid in full while other may be subject to a deductible, co-insurance, and/or copay. Some of these benefits include a comprehensive review of your medical history including lifestyle, illnesses that may run in your family, and various assessments and screenings as appropriate. After reviewing your medical record and screening and assessments performed today your provider may have ordered immunizations, labs, imaging, and/or referrals for you. A list of these orders (if applicable) as well as your Preventive Care list are included within your After Visit Summary for your review. Other Preventive Recommendations:    · A preventive eye exam performed by an eye specialist is recommended every 1-2 years to screen for glaucoma; cataracts, macular degeneration, and other eye disorders. · A preventive dental visit is recommended every 6 months. · Try to get at least 150 minutes of exercise per week or 10,000 steps per day on a pedometer . · Order or download the FREE \"Exercise & Physical Activity: Your Everyday Guide\" from [employer]. Call 8-208.843.8029 or search [employer] online. · You need 2277-8310 mg of calcium and 3722-7638 IU of vitamin D per day. It is possible to meet your calcium requirement with diet alone, but a vitamin D supplement is usually necessary to meet this goal.  · When exposed to the sun, use a sunscreen that protects against both UVA and UVB radiation with an SPF of 30 or greater. Reapply every 2 to 3 hours or after sweating, drying off with a towel, or swimming. · Always wear a seat belt when traveling in a car. Always wear a helmet when riding a bicycle or motorcycle.

## 2020-01-22 ENCOUNTER — NURSE ONLY (OUTPATIENT)
Dept: CARDIOLOGY CLINIC | Age: 75
End: 2020-01-22
Payer: MEDICARE

## 2020-01-22 PROCEDURE — 93281 PM DEVICE PROGR EVAL MULTI: CPT | Performed by: INTERNAL MEDICINE

## 2020-01-22 NOTE — PROGRESS NOTES
DR Poncho Vergara PT  [facility] PACEMAKER CHECK IN OFFICE PER [facility] REP  BATTERY 8 YRS REMAINING  A PACED 37%  BV PACED 92%  DDDR   PRESENTS IN AVBVP   UNDERLYING SB 40'S  ATRIAL IMPEDENCE 450  RV IMPEDENCE 700  LV IMPEDENCE 510  P WAVES >5  RV WAVES >12  ATRIAL THRESHOLD 0.5 @ 0.5  RV THRESHOLD 0.8 @ 0.5  LV THRESHOLD 0.7 @ 0.5  ATRIAL AND RV THRESHOLD PROGRAMMED ON AUTO  LV THRESHOLD 2 @ 0.5  AMS X 1  MARY JUDGE WNL

## 2020-03-11 ENCOUNTER — OFFICE VISIT (OUTPATIENT)
Dept: PULMONOLOGY | Age: 75
End: 2020-03-11
Payer: MEDICARE

## 2020-03-11 VITALS
WEIGHT: 227 LBS | BODY MASS INDEX: 34.4 KG/M2 | OXYGEN SATURATION: 96 % | DIASTOLIC BLOOD PRESSURE: 64 MMHG | HEIGHT: 68 IN | TEMPERATURE: 97.9 F | HEART RATE: 70 BPM | SYSTOLIC BLOOD PRESSURE: 126 MMHG

## 2020-03-11 PROCEDURE — 4040F PNEUMOC VAC/ADMIN/RCVD: CPT | Performed by: NURSE PRACTITIONER

## 2020-03-11 PROCEDURE — 3017F COLORECTAL CA SCREEN DOC REV: CPT | Performed by: NURSE PRACTITIONER

## 2020-03-11 PROCEDURE — G8484 FLU IMMUNIZE NO ADMIN: HCPCS | Performed by: NURSE PRACTITIONER

## 2020-03-11 PROCEDURE — G8427 DOCREV CUR MEDS BY ELIG CLIN: HCPCS | Performed by: NURSE PRACTITIONER

## 2020-03-11 PROCEDURE — 99213 OFFICE O/P EST LOW 20 MIN: CPT | Performed by: NURSE PRACTITIONER

## 2020-03-11 PROCEDURE — 3023F SPIROM DOC REV: CPT | Performed by: NURSE PRACTITIONER

## 2020-03-11 PROCEDURE — G8417 CALC BMI ABV UP PARAM F/U: HCPCS | Performed by: NURSE PRACTITIONER

## 2020-03-11 PROCEDURE — G8926 SPIRO NO PERF OR DOC: HCPCS | Performed by: NURSE PRACTITIONER

## 2020-03-11 PROCEDURE — 1036F TOBACCO NON-USER: CPT | Performed by: NURSE PRACTITIONER

## 2020-03-11 PROCEDURE — 1123F ACP DISCUSS/DSCN MKR DOCD: CPT | Performed by: NURSE PRACTITIONER

## 2020-03-11 ASSESSMENT — ENCOUNTER SYMPTOMS
ABDOMINAL PAIN: 0
NAUSEA: 0
VOMITING: 0
CHEST TIGHTNESS: 0
COUGH: 0
WHEEZING: 0
EYES NEGATIVE: 1
DIARRHEA: 0
SHORTNESS OF BREATH: 1

## 2020-03-11 NOTE — PROGRESS NOTES
[facility]     Patient: Corey Carney, 69 y.o.   : 1945  3/11/2020    Pt of Dr. Saira Alberts   Patient presents with    Follow-up     6mo COPD f/u no test.        HPI  Paul Thomas is here for 6 month follow up for COPD. Using: Amauri Lock as prescribed, rarely using Ventolin. SOB only occurs with stairs and hills, tries to avoid both  No recent exacerbations, no hospitalizations   MMRC Grade 1:  I get short of breath when hurrying on the level ground, or walking up a slight hill    Per Dr Patria Son note: high suspicion for PERCY, pt declines PSG  Last spirometry 2019: FEV 1 66%, + air trapping, normal diffusion   6 MWT 2019- no hypoxia , A1A- MM no deficiency   Pulm rehab? Non smoker, does not need lung cancer screening   Traveled to [place] last week for [employer], no ill contacts   Is UTD with PNA and flu vaccine.      Past Medical hx   PMH:  Past Medical History:   Diagnosis Date    CAD (coronary artery disease)     [address]     anemic    Compartment syndrome ([facility]     Right arm    Heart murmur     Hyperlipidemia     Hypertension     Ventricular hypertrophy     left     SURGICAL HISTORY:  Past Surgical History:   Procedure Laterality Date    AORTIC VALVE REPLACEMENT      2008    ARM SURGERY Right     Compartment syndrome - may 2010    CARPAL TUNNEL RELEASE Bilateral     CORONARY ARTERY BYPASS GRAFT      2008    DIAGNOSTIC CARDIAC CATH LAB PROCEDURE      ROTATOR CUFF REPAIR      with 3 pins placed      SOCIAL HISTORY:  Social History     Tobacco Use    Smoking status: Never Smoker    Smokeless tobacco: Never Used   Substance Use Topics    Alcohol use: Yes     Comment: occasionally    Drug use: No     ALLERGIES:  Allergies   Allergen Reactions    Erythromycin Hives    Sulfa Antibiotics Other (See Comments)     PT DOESN'T REMEMBER    Vasotec [Enalapril] Other (See Comments)     cough FAMILY HISTORY:  Family History   Problem Relation Age of Onset    Alzheimer's Disease Mother     Cancer Father     Heart Disease Brother     Hypertension Brother     Stroke Brother      CURRENT MEDICATIONS:  Current Outpatient Medications   Medication Sig Dispense Refill    losartan (COZAAR) 25 MG tablet Take 1 tablet by mouth daily 90 tablet 1    metoprolol tartrate (LOPRESSOR) 50 MG tablet Take 1 tablet by mouth 2 times daily 180 tablet 1    allopurinol (ZYLOPRIM) 300 MG tablet Take 1 tablet by mouth daily 90 tablet 1    warfarin (COUMADIN) 5 MG tablet AS DIRECTED PER DR EATON 90 tablet 1    warfarin (COUMADIN) 2 MG tablet AS DIRECTED PER DR EATON 270 tablet 1    atorvastatin (LIPITOR) 80 MG tablet TAKE 1 TABLET BY MOUTH ONE TIME A DAY 90 tablet 1    hydrochlorothiazide (HYDRODIURIL) 25 MG tablet Take 1 tablet by mouth every morning 90 tablet 2    albuterol sulfate HFA (PROVENTIL HFA) 108 (90 Base) MCG/ACT inhaler Inhale 2 puffs into the lungs every 6 hours as needed for Wheezing 1 Inhaler 5    Tiotropium Bromide-Olodaterol (STIOLTO RESPIMAT) 2.5-2.5 MCG/ACT AERS 2 inhalations once daily 12 g 3    nitroGLYCERIN (NITROSTAT) 0.4 MG SL tablet Place 1 tablet under the tongue every 5 minutes as needed for Chest pain 25 tablet 3    triamcinolone (KENALOG) 0.1 % cream Apply topically as needed Apply topically 2 times daily.  aspirin 81 MG EC tablet Take 81 mg by mouth daily. No current facility-administered medications for this visit. Tai SANCHEZ   Review of Systems   Constitutional: Negative for chills and fever. HENT: Negative. Eyes: Negative. Respiratory: Positive for shortness of breath. Negative for cough, chest tightness and wheezing. Cardiovascular: Negative for chest pain, palpitations and leg swelling. Gastrointestinal: Negative for abdominal pain, diarrhea, nausea and vomiting. Genitourinary: Negative. Musculoskeletal: Negative. Skin: Negative.

## 2020-04-03 NOTE — TELEPHONE ENCOUNTER
4/3/20   Dionisio Krueger called requesting a refill on the following medications:  Requested Prescriptions     Pending Prescriptions Disp Refills    atorvastatin (LIPITOR) 80 MG tablet 90 tablet 1     Sig: TAKE 1 TABLET BY MOUTH ONE TIME A DAY     Pharmacy verified: [facility]   . pv    REQUESTING MAINTENANCE DOSE      Date of last visit:  1/20/20  Date of next visit (if applicable): 7/88/9958  blm

## 2020-04-04 RX ORDER — ATORVASTATIN CALCIUM 80 MG/1
TABLET, FILM COATED ORAL
Qty: 90 TABLET | Refills: 1 | Status: SHIPPED | OUTPATIENT
Start: 2020-04-04 | End: 2020-01-01 | Stop reason: SDUPTHER

## 2020-04-23 ENCOUNTER — TELEPHONE (OUTPATIENT)
Dept: OTHER | Facility: CLINIC | Age: 75
End: 2020-04-23

## 2020-04-30 ENCOUNTER — PROCEDURE VISIT (OUTPATIENT)
Dept: CARDIOLOGY CLINIC | Age: 75
End: 2020-04-30
Payer: MEDICARE

## 2020-04-30 PROCEDURE — 93294 REM INTERROG EVL PM/LDLS PM: CPT | Performed by: INTERNAL MEDICINE

## 2020-04-30 PROCEDURE — 93296 REM INTERROG EVL PM/IDS: CPT | Performed by: INTERNAL MEDICINE

## 2020-04-30 NOTE — PROGRESS NOTES
[facility] bi v pacer  Battery 8.1-8.4yrs  A paced 44%  BP 89%  p wave 4.2mV  r wave >12.0mV  Atrial impedance 440 ohms  Ventricle impedance 590ohms  lv 510 ohms  8 beats of vt ( 2 different episodes )

## 2020-07-19 NOTE — PROGRESS NOTES
Antibiotics Other (See Comments)     PT DOESN'T REMEMBER    Vasotec [Enalapril] Other (See Comments)     cough       Subjective:      Review of Systems   Constitutional: Negative for chills and fever. HENT: Negative for congestion and rhinorrhea. Eyes: Negative for discharge and redness. Respiratory: Negative for chest tightness and shortness of breath. Cardiovascular: Positive for leg swelling. Negative for chest pain and palpitations. Gastrointestinal: Negative for blood in stool, diarrhea, nausea and vomiting. Endocrine: Negative for polydipsia, polyphagia and polyuria. Genitourinary: Negative for difficulty urinating and hematuria. Musculoskeletal: Positive for arthralgias, joint swelling and myalgias. Skin: Negative for color change and pallor. Neurological: Negative for headaches. Hematological: Bruises/bleeds easily. Psychiatric/Behavioral: Negative for self-injury and suicidal ideas. Objective:     BP (!) 144/82 (Site: Right Upper Arm, Position: Sitting, Cuff Size: Large Adult)   Pulse 84   Temp 97.8 °F (36.6 °C) (Temporal)   Resp 14   Wt 219 lb (99.3 kg)   BMI 33.30 kg/m²     Physical Exam  Vitals signs reviewed. Constitutional:       General: He is not in acute distress. Appearance: He is well-developed. HENT:      Head: Normocephalic and atraumatic. Nose: Nose normal.   Eyes:      Conjunctiva/sclera: Conjunctivae normal.   Neck:      Musculoskeletal: Normal range of motion and neck supple. Cardiovascular:      Rate and Rhythm: Normal rate and regular rhythm. Pulmonary:      Effort: Pulmonary effort is normal. No respiratory distress. Breath sounds: Normal breath sounds. No wheezing. Abdominal:      General: Bowel sounds are normal.      Palpations: Abdomen is soft. Musculoskeletal:      Right lower leg: Edema present. Left lower leg: Edema present. Skin:     General: Skin is warm and dry. Findings: No rash.       Comments: Scar on right forearm from compartment syndrome. Neurological:      Mental Status: He is alert and oriented to person, place, and time. Psychiatric:         Behavior: Behavior normal.       Assessment/Plan:     Winston Hensley was seen today for 6 month follow-up, hypertension and letter for school/work. Diagnoses and all orders for this visit:    Essential hypertension  -     Comprehensive Metabolic Panel; Future  -     losartan (COZAAR) 25 MG tablet; Take 1 tablet by mouth daily  -     metoprolol tartrate (LOPRESSOR) 50 MG tablet; Take 1 tablet by mouth 2 times daily  -     hydroCHLOROthiazide (HYDRODIURIL) 25 MG tablet; Take 1 tablet by mouth every morning    Pure hypercholesterolemia  -     Lipid Panel; Future  -     atorvastatin (LIPITOR) 80 MG tablet; TAKE 1 TABLET BY MOUTH ONE TIME A DAY    Type 2 diabetes mellitus without complication, without long-term current use of insulin ([facility])  -     HbA1c is elevated at 8.0% so will start on Metformin. A healthy diet and routine physical activity encouraged. -     Microalbumin / Creatinine Urine Ratio; Future  -     POCT glycosylated hemoglobin (Hb A1C)  -     metFORMIN (GLUCOPHAGE) 500 MG tablet; Take 1 tablet by mouth 2 times daily (with meals)    ASCVD (arteriosclerotic cardiovascular disease)    Other orders  -     allopurinol (ZYLOPRIM) 300 MG tablet; Take 1 tablet by mouth daily  -     albuterol sulfate HFA (PROVENTIL HFA) 108 (90 Base) MCG/ACT inhaler; Inhale 2 puffs into the lungs every 6 hours as needed for Wheezing  -     Tiotropium Bromide-Olodaterol (STIOLTO RESPIMAT) 2.5-2.5 MCG/ACT AERS; 2 inhalations once daily        Return in about 6 months (around 1/20/2021) for Medicare AWV.     Electronically signed by Alysia Arias MD on 7/20/2020 at 4:26 PM

## 2020-08-06 NOTE — PROGRESS NOTES
DR Ivy Villegas PT  [facility] PACEMAKER REMOTE     BATTERY 8-8.4 YRS REMAINING    ATRIAL IMPEDENCE 440  VENT IMPEDENCE 660  LV IMPEDENCE 490    P WAVES 4.1  RV WAVES >12    ATRIAL THRESHOLD PER THE DEVICE 0.625 @ 0.5  RV THRESHOLD PER THE DEVICE 0.75 @ 0.5  LV THRESHOLD NOT PERFORMED     DDDR     A PACED 40%  BV PACED 91%      PT DID MODE SWITCH X 2 OR <1%  AFIB BURDEN <1%  BOTH MODE SWITCHES WERE UNDER 8 SECONDS   1 NS VT EPISODE FOR 2 SECONDS   NU BENDERL

## 2020-08-19 NOTE — TELEPHONE ENCOUNTER
Oly Leader called requesting a refill on the following medications:  Requested Prescriptions     Pending Prescriptions Disp Refills    nitroGLYCERIN (NITROSTAT) 0.4 MG SL tablet 25 tablet 3     Sig: Place 1 tablet under the tongue every 5 minutes as needed for Chest pain     Pharmacy verified:[facility]  . pv      Date of last visit: 10/31/19  Date of next visit (if applicable): 91/80/2101

## 2020-09-15 NOTE — TELEPHONE ENCOUNTER
Patient's wife Guevara Score phoned- states that Seema Alvarez was given a prescription for medical massage at his last appointment. Patient misplaced the Rx, requesting new prescription. Call when ready and they will .     Guevara Score: [phone]

## 2020-10-01 NOTE — TELEPHONE ENCOUNTER
Kassi Zazueta called requesting a refill on the following medications:  Requested Prescriptions     Pending Prescriptions Disp Refills    warfarin (COUMADIN) 2 MG tablet 270 tablet 1     Sig: AS DIRECTED PER DR EATON       Date of last visit: 7/20/2020  Date of next visit (if applicable):1/25/2021  Date of last refill:   Pharmacy Name:       [facility], [address] [place] [address]

## 2020-10-14 NOTE — TELEPHONE ENCOUNTER
Mariah Bustamante called requesting a refill on the following medications:  Requested Prescriptions     Pending Prescriptions Disp Refills    atorvastatin (LIPITOR) 80 MG tablet [Pharmacy Med Name: Atorvastatin Calcium Oral Tablet 80 MG] 90 tablet 1     Sig: TAKE 1 TABLET BY MOUTH ONE TIME A DAY       Date of last visit: 7/20/2020  Date of next visit (if applicable):1/25/2021  Date of last refill:   Pharmacy Name:       Melani Shaver, [address] [place] [address]

## 2020-11-05 PROBLEM — I49.2 JUNCTIONAL PREMATURE DEPOLARIZATION (HCC): Status: ACTIVE | Noted: 2020-01-01

## 2020-11-05 NOTE — PROGRESS NOTES
[address] [address]  Dept: [phone]  Dept Fax: [phone]  Loc: [phone]    Visit Date: 11/5/2020    Danna Vasquez is a 77 y.o. male who presents todayfor:  Chief Complaint   Patient presents with    Check-Up    Coronary Artery Disease    Cardiac Valve Problem    Hypertension     Known AVR   Cath last year   Occluded SVG to diagonal which is the only graft  Baseline dyspnea  Known COPD  Some palpitation   BP is stable  No dizziness  No syncope      HPI:  HPI  Past Medical History:   Diagnosis Date    CAD (coronary artery disease)     Clotting disorder (HCC)     anemic    Compartment syndrome (HCC)     Right arm    Heart murmur     Hyperlipidemia     Hypertension     Ventricular hypertrophy     left      Past Surgical History:   Procedure Laterality Date    AORTIC VALVE REPLACEMENT      sept 2008    ARM SURGERY Right     Compartment syndrome - may 2010    CARPAL TUNNEL RELEASE Bilateral     CORONARY ARTERY BYPASS GRAFT      sept 2008    DIAGNOSTIC CARDIAC CATH LAB PROCEDURE      ROTATOR CUFF REPAIR      with 3 pins placed      Family History   Problem Relation Age of Onset    Alzheimer's Disease Mother     Cancer Father     Heart Disease Brother     Hypertension Brother     Stroke Brother      Social History     Tobacco Use    Smoking status: Never Smoker    Smokeless tobacco: Never Used   Substance Use Topics    Alcohol use: Yes     Comment: occasionally      Current Outpatient Medications   Medication Sig Dispense Refill    atorvastatin (LIPITOR) 80 MG tablet TAKE 1 TABLET BY MOUTH ONE TIME A DAY  90 tablet 1    warfarin (COUMADIN) 2 MG tablet AS DIRECTED PER DR EATON 270 tablet 1    warfarin (COUMADIN) 5 MG tablet TAKE AS DIRECTED PER  DR EATON 90 tablet 0    nitroGLYCERIN (NITROSTAT) 0.4 MG SL tablet Place 1 tablet under the tongue every 5 minutes as needed for Chest pain 25 tablet 3    losartan (COZAAR) 25 MG tablet Take 1 tablet by mouth daily 90 tablet 1    metoprolol tartrate (LOPRESSOR) 50 MG tablet Take 1 tablet by mouth 2 times daily 180 tablet 1    allopurinol (ZYLOPRIM) 300 MG tablet Take 1 tablet by mouth daily 90 tablet 1    hydroCHLOROthiazide (HYDRODIURIL) 25 MG tablet Take 1 tablet by mouth every morning 90 tablet 2    albuterol sulfate HFA (PROVENTIL HFA) 108 (90 Base) MCG/ACT inhaler Inhale 2 puffs into the lungs every 6 hours as needed for Wheezing 1 Inhaler 5    Tiotropium Bromide-Olodaterol (STIOLTO RESPIMAT) 2.5-2.5 MCG/ACT AERS 2 inhalations once daily 12 g 3    metFORMIN (GLUCOPHAGE) 500 MG tablet Take 1 tablet by mouth 2 times daily (with meals) 180 tablet 1    triamcinolone (KENALOG) 0.1 % cream Apply topically as needed Apply topically 2 times daily.  aspirin 81 MG EC tablet Take 81 mg by mouth daily. No current facility-administered medications for this visit.       Allergies   Allergen Reactions    Erythromycin Hives    Sulfa Antibiotics Other (See Comments)     PT DOESN'T REMEMBER    Vasotec [Enalapril] Other (See Comments)     cough     Health Maintenance   Topic Date Due    Diabetic foot exam  06/27/1956    Diabetic retinal exam  06/27/1956    DTaP/Tdap/Td vaccine (1 - Tdap) 06/27/1965    Shingles Vaccine (1 of 2) 06/27/1996    Colon Cancer Screen FIT/FOBT  02/04/2020    Lipid screen  04/06/2020    Potassium monitoring  06/03/2020    Creatinine monitoring  06/03/2020    Flu vaccine (1) 09/01/2020    Diabetic microalbuminuria test  01/20/2021    Annual Wellness Visit (AWV)  01/20/2021    A1C test (Diabetic or Prediabetic)  07/20/2021    Pneumococcal 65+ years Vaccine  Completed    Hepatitis C screen  Completed    Hepatitis A vaccine  Aged Out    Hib vaccine  Aged Out    Meningococcal (ACWY) vaccine  Aged Out       Subjective:  Review of Systems  General:   No fever, no chills, some fatigue or weight loss  Pulmonary:    some dyspnea, no wheezing  Cardiac:    Denies recent chest pain,   GI:     No nausea or vomiting, no abdominal pain  Neuro:    No dizziness or light headedness,   Musculoskeletal:  No recent active issues  Extremities:   No edema, no obvious claudication       Objective:  Physical Exam  /80   Pulse 70   Ht 5' 8\" (1.727 m)   Wt 216 lb (98 kg)   BMI 32.84 kg/m²   General:   Well developed, well nourished  Lungs:   Clear to auscultation  Heart:    Normal S1 S2, Slight murmur. no rubs, no gallops  Abdomen:   Soft, non tender, no organomegalies, positive bowel sounds  Extremities:   No edema, no cyanosis, good peripheral pulses  Neurological:   Awake, alert, oriented. No obvious focal deficits  Musculoskelatal:  No obvious deformities    Assessment:      Diagnosis Orders   1. Coronary artery disease involving coronary bypass graft of native heart without angina pectoris  EKG 12 lead   2. Essential hypertension     3. Pacemaker     as above  Seems stable   ECG in office was done today. I reviewed the ECG. No acute findings      Plan:  No follow-ups on file. As above  Continue risk factor modification and medical management  Thank you for allowing me to participate in the care of your patient. Please don't hesitate to contact me regarding any further issues related to the patient care    Orders Placed:  Orders Placed This Encounter   Procedures    EKG 12 lead     Order Specific Question:   Reason for Exam?     Answer: Other       Medications Prescribed:  No orders of the defined types were placed in this encounter. Discussed use, benefit, and side effects of prescribed medications. All patient questions answered. Pt voicedunderstanding. Instructed to continue current medications, diet and exercise. Continue risk factor modification and medical management. Patient agreed with treatment plan. Follow up as directed.     Electronically signedby Zelda Tan MD on 11/5/2020 at 1:43 PM

## 2020-11-11 NOTE — PROGRESS NOTES
DR Bria Bhatia PT  [facility] PACEMAKER REMOTE   BATTERY 8.3-8.7 YRS REMAINING  ATRIAL IMPEDENCE 430  VENT IMPEDENCE 730  LV IMPEDENCE 540  P WAVES >5  RV WAVES >12  ATRIAL THRESHOLD PER THE DEVICE 0.625 @ 0.5  RV THRESHOLD PER THE DEVICE 1.125 @ 0.5  LV THRESHOLD NOT OBTAINED PER THE DEVICE   ATRIAL AND VENT AMPLTUDES PROGRAMMED AUTO  DDDR   A PACED 37%  BV PACED 91%  CORVUE WNL

## 2021-01-01 ENCOUNTER — APPOINTMENT (OUTPATIENT)
Dept: GENERAL RADIOLOGY | Age: 76
DRG: 177 | End: 2021-01-01
Payer: MEDICARE

## 2021-01-01 ENCOUNTER — NURSE ONLY (OUTPATIENT)
Dept: CARDIOLOGY CLINIC | Age: 76
End: 2021-01-01
Payer: MEDICARE

## 2021-01-01 ENCOUNTER — TELEPHONE (OUTPATIENT)
Dept: CARDIOLOGY CLINIC | Age: 76
End: 2021-01-01

## 2021-01-01 ENCOUNTER — HOSPITAL ENCOUNTER (OUTPATIENT)
Age: 76
Setting detail: SPECIMEN
Discharge: HOME | End: 2021-04-13
Payer: MEDICARE

## 2021-01-01 ENCOUNTER — APPOINTMENT (OUTPATIENT)
Dept: GENERAL RADIOLOGY | Age: 76
End: 2021-01-01
Payer: MEDICARE

## 2021-01-01 ENCOUNTER — HOSPITAL ENCOUNTER (INPATIENT)
Age: 76
LOS: 13 days | DRG: 177 | End: 2021-05-05
Attending: EMERGENCY MEDICINE | Admitting: FAMILY MEDICINE
Payer: MEDICARE

## 2021-01-01 ENCOUNTER — HOSPITAL ENCOUNTER (EMERGENCY)
Age: 76
Discharge: HOME | End: 2021-04-17
Attending: EMERGENCY MEDICINE
Payer: MEDICARE

## 2021-01-01 ENCOUNTER — PROCEDURE VISIT (OUTPATIENT)
Dept: CARDIOLOGY CLINIC | Age: 76
End: 2021-01-01
Payer: MEDICARE

## 2021-01-01 ENCOUNTER — OFFICE VISIT (OUTPATIENT)
Dept: PULMONOLOGY | Age: 76
End: 2021-01-01
Payer: MEDICARE

## 2021-01-01 ENCOUNTER — CARE COORDINATION (OUTPATIENT)
Dept: CARE COORDINATION | Age: 76
End: 2021-01-01

## 2021-01-01 ENCOUNTER — TELEPHONE (OUTPATIENT)
Dept: FAMILY MEDICINE CLINIC | Age: 76
End: 2021-01-01

## 2021-01-01 ENCOUNTER — OFFICE VISIT (OUTPATIENT)
Dept: FAMILY MEDICINE CLINIC | Age: 76
End: 2021-01-01
Payer: MEDICARE

## 2021-01-01 VITALS
SYSTOLIC BLOOD PRESSURE: 133 MMHG | WEIGHT: 209.4 LBS | TEMPERATURE: 96.6 F | BODY MASS INDEX: 31.74 KG/M2 | DIASTOLIC BLOOD PRESSURE: 59 MMHG | HEIGHT: 68 IN

## 2021-01-01 VITALS
HEIGHT: 68 IN | WEIGHT: 210 LBS | OXYGEN SATURATION: 95 % | RESPIRATION RATE: 15 BRPM | HEART RATE: 72 BPM | SYSTOLIC BLOOD PRESSURE: 133 MMHG | DIASTOLIC BLOOD PRESSURE: 63 MMHG | BODY MASS INDEX: 31.83 KG/M2 | TEMPERATURE: 98.7 F

## 2021-01-01 VITALS
TEMPERATURE: 97.5 F | BODY MASS INDEX: 33.83 KG/M2 | SYSTOLIC BLOOD PRESSURE: 156 MMHG | HEIGHT: 68 IN | WEIGHT: 223.2 LBS | DIASTOLIC BLOOD PRESSURE: 74 MMHG | OXYGEN SATURATION: 97 % | HEART RATE: 68 BPM

## 2021-01-01 VITALS
OXYGEN SATURATION: 98 % | SYSTOLIC BLOOD PRESSURE: 122 MMHG | HEART RATE: 60 BPM | HEIGHT: 68 IN | DIASTOLIC BLOOD PRESSURE: 78 MMHG | WEIGHT: 219 LBS | BODY MASS INDEX: 33.19 KG/M2

## 2021-01-01 DIAGNOSIS — I50.9 CONGESTIVE HEART FAILURE, UNSPECIFIED HF CHRONICITY, UNSPECIFIED HEART FAILURE TYPE (HCC): Primary | ICD-10-CM

## 2021-01-01 DIAGNOSIS — J96.01 ACUTE RESPIRATORY FAILURE WITH HYPOXIA (HCC): ICD-10-CM

## 2021-01-01 DIAGNOSIS — Z00.00 ENCOUNTER FOR MEDICARE ANNUAL WELLNESS EXAM: Primary | ICD-10-CM

## 2021-01-01 DIAGNOSIS — U07.1 COVID-19 VIRUS INFECTION: Primary | ICD-10-CM

## 2021-01-01 DIAGNOSIS — I25.10 ASCVD (ARTERIOSCLEROTIC CARDIOVASCULAR DISEASE): ICD-10-CM

## 2021-01-01 DIAGNOSIS — J44.9 COPD, GROUP A, BY GOLD 2017 CLASSIFICATION (HCC): ICD-10-CM

## 2021-01-01 DIAGNOSIS — I50.22 CHRONIC SYSTOLIC CONGESTIVE HEART FAILURE (HCC): ICD-10-CM

## 2021-01-01 DIAGNOSIS — R09.02 HYPOXIA: Primary | ICD-10-CM

## 2021-01-01 DIAGNOSIS — E78.00 PURE HYPERCHOLESTEROLEMIA: ICD-10-CM

## 2021-01-01 DIAGNOSIS — R53.83 FATIGUE, UNSPECIFIED TYPE: ICD-10-CM

## 2021-01-01 DIAGNOSIS — U07.1 COVID-19: ICD-10-CM

## 2021-01-01 DIAGNOSIS — R50.9 FEVER, UNSPECIFIED FEVER CAUSE: Primary | ICD-10-CM

## 2021-01-01 DIAGNOSIS — Z12.11 SCREENING FOR COLON CANCER: ICD-10-CM

## 2021-01-01 DIAGNOSIS — J44.9 COPD, GROUP A, BY GOLD 2017 CLASSIFICATION (HCC): Primary | ICD-10-CM

## 2021-01-01 DIAGNOSIS — J44.9 MODERATE COPD (CHRONIC OBSTRUCTIVE PULMONARY DISEASE) (HCC): ICD-10-CM

## 2021-01-01 DIAGNOSIS — E11.9 TYPE 2 DIABETES MELLITUS WITHOUT COMPLICATION, WITHOUT LONG-TERM CURRENT USE OF INSULIN (HCC): ICD-10-CM

## 2021-01-01 DIAGNOSIS — R19.7 DIARRHEA, UNSPECIFIED TYPE: ICD-10-CM

## 2021-01-01 DIAGNOSIS — I10 ESSENTIAL HYPERTENSION: ICD-10-CM

## 2021-01-01 DIAGNOSIS — R50.9 FEVER, UNSPECIFIED FEVER CAUSE: ICD-10-CM

## 2021-01-01 DIAGNOSIS — Z95.0 PACEMAKER: Primary | ICD-10-CM

## 2021-01-01 LAB
ALBUMIN SERPL-MCNC: 2.9 G/DL (ref 3.5–5.1)
ALBUMIN SERPL-MCNC: 3 G/DL (ref 3.5–5.1)
ALBUMIN SERPL-MCNC: 3 G/DL (ref 3.5–5.1)
ALBUMIN SERPL-MCNC: 3.1 G/DL (ref 3.5–5.1)
ALBUMIN SERPL-MCNC: 3.2 G/DL (ref 3.5–5.1)
ALBUMIN SERPL-MCNC: 3.3 G/DL (ref 3.5–5.1)
ALBUMIN SERPL-MCNC: 3.4 G/DL (ref 3.5–5.1)
ALBUMIN SERPL-MCNC: 3.6 G/DL (ref 3.5–5.1)
ALBUMIN SERPL-MCNC: 3.9 G/DL (ref 3.5–5.1)
ALLEN TEST: POSITIVE
ALLEN TEST: POSITIVE
ALP BLD-CCNC: 123 U/L (ref 38–126)
ALP BLD-CCNC: 161 U/L (ref 38–126)
ALP BLD-CCNC: 50 U/L (ref 38–126)
ALP BLD-CCNC: 57 U/L (ref 38–126)
ALP BLD-CCNC: 59 U/L (ref 38–126)
ALP BLD-CCNC: 61 U/L (ref 38–126)
ALP BLD-CCNC: 62 U/L (ref 38–126)
ALP BLD-CCNC: 63 U/L (ref 38–126)
ALP BLD-CCNC: 63 U/L (ref 38–126)
ALP BLD-CCNC: 65 U/L (ref 38–126)
ALP BLD-CCNC: 72 U/L (ref 38–126)
ALP BLD-CCNC: 90 U/L (ref 38–126)
ALT SERPL-CCNC: 102 U/L (ref 11–66)
ALT SERPL-CCNC: 105 U/L (ref 11–66)
ALT SERPL-CCNC: 115 U/L (ref 11–66)
ALT SERPL-CCNC: 134 U/L (ref 11–66)
ALT SERPL-CCNC: 140 U/L (ref 11–66)
ALT SERPL-CCNC: 47 U/L (ref 11–66)
ALT SERPL-CCNC: 55 U/L (ref 11–66)
ALT SERPL-CCNC: 61 U/L (ref 11–66)
ALT SERPL-CCNC: 74 U/L (ref 11–66)
ALT SERPL-CCNC: 82 U/L (ref 11–66)
ALT SERPL-CCNC: 88 U/L (ref 11–66)
ALT SERPL-CCNC: 89 U/L (ref 11–66)
ALT SERPL-CCNC: 93 U/L (ref 11–66)
ALT SERPL-CCNC: 93 U/L (ref 11–66)
ANION GAP SERPL CALCULATED.3IONS-SCNC: 10 MEQ/L (ref 8–16)
ANION GAP SERPL CALCULATED.3IONS-SCNC: 10 MEQ/L (ref 8–16)
ANION GAP SERPL CALCULATED.3IONS-SCNC: 11 MEQ/L (ref 8–16)
ANION GAP SERPL CALCULATED.3IONS-SCNC: 12 MEQ/L (ref 8–16)
ANION GAP SERPL CALCULATED.3IONS-SCNC: 13 MEQ/L (ref 8–16)
ANION GAP SERPL CALCULATED.3IONS-SCNC: 9 MEQ/L (ref 8–16)
ANION GAP SERPL CALCULATED.3IONS-SCNC: 9 MEQ/L (ref 8–16)
APTT: 100.2 SECONDS (ref 22–38)
APTT: 100.3 SECONDS (ref 22–38)
APTT: 122.7 SECONDS (ref 22–38)
APTT: 130.9 SECONDS (ref 22–38)
APTT: 36 SECONDS (ref 22–38)
APTT: 42.6 SECONDS (ref 22–38)
APTT: 54 SECONDS (ref 22–38)
APTT: 62.3 SECONDS (ref 22–38)
APTT: 77.6 SECONDS (ref 22–38)
APTT: 86.4 SECONDS (ref 22–38)
APTT: 91.6 SECONDS (ref 22–38)
APTT: 95.2 SECONDS (ref 22–38)
AST SERPL-CCNC: 34 U/L (ref 5–40)
AST SERPL-CCNC: 44 U/L (ref 5–40)
AST SERPL-CCNC: 46 U/L (ref 5–40)
AST SERPL-CCNC: 47 U/L (ref 5–40)
AST SERPL-CCNC: 52 U/L (ref 5–40)
AST SERPL-CCNC: 53 U/L (ref 5–40)
AST SERPL-CCNC: 54 U/L (ref 5–40)
AST SERPL-CCNC: 56 U/L (ref 5–40)
AST SERPL-CCNC: 57 U/L (ref 5–40)
AST SERPL-CCNC: 58 U/L (ref 5–40)
AST SERPL-CCNC: 61 U/L (ref 5–40)
AST SERPL-CCNC: 62 U/L (ref 5–40)
AST SERPL-CCNC: 64 U/L (ref 5–40)
AST SERPL-CCNC: 82 U/L (ref 5–40)
BASE EXCESS (CALCULATED): -1.9 MMOL/L (ref -2.5–2.5)
BASE EXCESS (CALCULATED): 2.6 MMOL/L (ref -2.5–2.5)
BASOPHILS # BLD: 0 %
BASOPHILS # BLD: 0.1 %
BASOPHILS # BLD: 0.2 %
BASOPHILS # BLD: 0.3 %
BASOPHILS # BLD: 0.3 %
BASOPHILS ABSOLUTE: 0 THOU/MM3 (ref 0–0.1)
BILIRUB SERPL-MCNC: 0.5 MG/DL (ref 0.3–1.2)
BILIRUB SERPL-MCNC: 0.7 MG/DL (ref 0.3–1.2)
BILIRUB SERPL-MCNC: 0.8 MG/DL (ref 0.3–1.2)
BILIRUB SERPL-MCNC: 0.9 MG/DL (ref 0.3–1.2)
BILIRUB SERPL-MCNC: 1 MG/DL (ref 0.3–1.2)
BILIRUB SERPL-MCNC: 1.1 MG/DL (ref 0.3–1.2)
BILIRUB SERPL-MCNC: 1.4 MG/DL (ref 0.3–1.2)
BILIRUBIN DIRECT: < 0.2 MG/DL (ref 0–0.3)
BUN BLDV-MCNC: 16 MG/DL (ref 7–22)
BUN BLDV-MCNC: 24 MG/DL (ref 7–22)
BUN BLDV-MCNC: 28 MG/DL (ref 7–22)
BUN BLDV-MCNC: 30 MG/DL (ref 7–22)
BUN BLDV-MCNC: 37 MG/DL (ref 7–22)
BUN BLDV-MCNC: 38 MG/DL (ref 7–22)
BUN BLDV-MCNC: 39 MG/DL (ref 7–22)
BUN BLDV-MCNC: 39 MG/DL (ref 7–22)
BUN BLDV-MCNC: 41 MG/DL (ref 7–22)
BUN BLDV-MCNC: 42 MG/DL (ref 7–22)
BUN BLDV-MCNC: 42 MG/DL (ref 7–22)
BUN BLDV-MCNC: 43 MG/DL (ref 7–22)
BUN BLDV-MCNC: 45 MG/DL (ref 7–22)
BUN BLDV-MCNC: 46 MG/DL (ref 7–22)
BUN BLDV-MCNC: 48 MG/DL (ref 7–22)
C-REACTIVE PROTEIN: 1.63 MG/DL (ref 0–1)
C-REACTIVE PROTEIN: 6.12 MG/DL (ref 0–1)
C-REACTIVE PROTEIN: 8.17 MG/DL (ref 0–1)
CALCIUM SERPL-MCNC: 8.5 MG/DL (ref 8.5–10.5)
CALCIUM SERPL-MCNC: 8.7 MG/DL (ref 8.5–10.5)
CALCIUM SERPL-MCNC: 8.8 MG/DL (ref 8.5–10.5)
CALCIUM SERPL-MCNC: 8.9 MG/DL (ref 8.5–10.5)
CALCIUM SERPL-MCNC: 8.9 MG/DL (ref 8.5–10.5)
CALCIUM SERPL-MCNC: 9 MG/DL (ref 8.5–10.5)
CALCIUM SERPL-MCNC: 9 MG/DL (ref 8.5–10.5)
CALCIUM SERPL-MCNC: 9.1 MG/DL (ref 8.5–10.5)
CALCIUM SERPL-MCNC: 9.1 MG/DL (ref 8.5–10.5)
CALCIUM SERPL-MCNC: 9.2 MG/DL (ref 8.5–10.5)
CALCIUM SERPL-MCNC: 9.3 MG/DL (ref 8.5–10.5)
CALCIUM SERPL-MCNC: 9.3 MG/DL (ref 8.5–10.5)
CALCIUM SERPL-MCNC: 9.6 MG/DL (ref 8.5–10.5)
CHLORIDE BLD-SCNC: 100 MEQ/L (ref 98–111)
CHLORIDE BLD-SCNC: 101 MEQ/L (ref 98–111)
CHLORIDE BLD-SCNC: 101 MEQ/L (ref 98–111)
CHLORIDE BLD-SCNC: 102 MEQ/L (ref 98–111)
CHLORIDE BLD-SCNC: 105 MEQ/L (ref 98–111)
CHLORIDE BLD-SCNC: 106 MEQ/L (ref 98–111)
CHLORIDE BLD-SCNC: 96 MEQ/L (ref 98–111)
CHLORIDE BLD-SCNC: 98 MEQ/L (ref 98–111)
CHLORIDE BLD-SCNC: 99 MEQ/L (ref 98–111)
CO2: 20 MEQ/L (ref 23–33)
CO2: 21 MEQ/L (ref 23–33)
CO2: 22 MEQ/L (ref 23–33)
CO2: 22 MEQ/L (ref 23–33)
CO2: 23 MEQ/L (ref 23–33)
CO2: 23 MEQ/L (ref 23–33)
CO2: 24 MEQ/L (ref 23–33)
CO2: 26 MEQ/L (ref 23–33)
CO2: 26 MEQ/L (ref 23–33)
CO2: 27 MEQ/L (ref 23–33)
CO2: 28 MEQ/L (ref 23–33)
CO2: 29 MEQ/L (ref 23–33)
CO2: 30 MEQ/L (ref 23–33)
COLLECTED BY:: ABNORMAL
COLLECTED BY:: ABNORMAL
CREAT SERPL-MCNC: 0.7 MG/DL (ref 0.4–1.2)
CREAT SERPL-MCNC: 0.8 MG/DL (ref 0.4–1.2)
CREAT SERPL-MCNC: 0.9 MG/DL (ref 0.4–1.2)
CREAT SERPL-MCNC: 1 MG/DL (ref 0.4–1.2)
D-DIMER QUANTITATIVE: 1747 NG/ML FEU (ref 0–500)
D-DIMER QUANTITATIVE: 220 NG/ML FEU (ref 0–500)
D-DIMER QUANTITATIVE: 236 NG/ML FEU (ref 0–500)
D-DIMER QUANTITATIVE: 283 NG/ML FEU (ref 0–500)
D-DIMER QUANTITATIVE: 343 NG/ML FEU (ref 0–500)
D-DIMER QUANTITATIVE: 569 NG/ML FEU (ref 0–500)
D-DIMER QUANTITATIVE: 7494 NG/ML FEU (ref 0–500)
D-DIMER QUANTITATIVE: < 215 NG/ML FEU (ref 0–500)
DEVICE: ABNORMAL
DEVICE: ABNORMAL
EKG ATRIAL RATE: 64 BPM
EKG ATRIAL RATE: 64 BPM
EKG P AXIS: 34 DEGREES
EKG P AXIS: 59 DEGREES
EKG P-R INTERVAL: 162 MS
EKG Q-T INTERVAL: 456 MS
EKG Q-T INTERVAL: 472 MS
EKG QRS DURATION: 116 MS
EKG QRS DURATION: 94 MS
EKG QTC CALCULATION (BAZETT): 470 MS
EKG QTC CALCULATION (BAZETT): 486 MS
EKG R AXIS: -43 DEGREES
EKG R AXIS: 29 DEGREES
EKG T AXIS: 37 DEGREES
EKG T AXIS: 84 DEGREES
EKG VENTRICULAR RATE: 64 BPM
EKG VENTRICULAR RATE: 64 BPM
EOSINOPHIL # BLD: 0 %
EOSINOPHIL # BLD: 0.1 %
EOSINOPHIL # BLD: 0.1 %
EOSINOPHIL # BLD: 0.2 %
EOSINOPHIL # BLD: 0.2 %
EOSINOPHIL # BLD: 0.6 %
EOSINOPHIL # BLD: 0.7 %
EOSINOPHIL # BLD: 0.7 %
EOSINOPHIL # BLD: 0.9 %
EOSINOPHIL # BLD: 3.7 %
EOSINOPHILS ABSOLUTE: 0 THOU/MM3 (ref 0–0.4)
EOSINOPHILS ABSOLUTE: 0.1 THOU/MM3 (ref 0–0.4)
EOSINOPHILS ABSOLUTE: 0.4 THOU/MM3 (ref 0–0.4)
ERYTHROCYTE [DISTWIDTH] IN BLOOD BY AUTOMATED COUNT: 12.8 % (ref 11.5–14.5)
ERYTHROCYTE [DISTWIDTH] IN BLOOD BY AUTOMATED COUNT: 12.9 % (ref 11.5–14.5)
ERYTHROCYTE [DISTWIDTH] IN BLOOD BY AUTOMATED COUNT: 13 % (ref 11.5–14.5)
ERYTHROCYTE [DISTWIDTH] IN BLOOD BY AUTOMATED COUNT: 13 % (ref 11.5–14.5)
ERYTHROCYTE [DISTWIDTH] IN BLOOD BY AUTOMATED COUNT: 13.1 % (ref 11.5–14.5)
ERYTHROCYTE [DISTWIDTH] IN BLOOD BY AUTOMATED COUNT: 13.2 % (ref 11.5–14.5)
ERYTHROCYTE [DISTWIDTH] IN BLOOD BY AUTOMATED COUNT: 13.3 % (ref 11.5–14.5)
ERYTHROCYTE [DISTWIDTH] IN BLOOD BY AUTOMATED COUNT: 13.3 % (ref 11.5–14.5)
ERYTHROCYTE [DISTWIDTH] IN BLOOD BY AUTOMATED COUNT: 40.9 FL (ref 35–45)
ERYTHROCYTE [DISTWIDTH] IN BLOOD BY AUTOMATED COUNT: 41.4 FL (ref 35–45)
ERYTHROCYTE [DISTWIDTH] IN BLOOD BY AUTOMATED COUNT: 41.6 FL (ref 35–45)
ERYTHROCYTE [DISTWIDTH] IN BLOOD BY AUTOMATED COUNT: 41.8 FL (ref 35–45)
ERYTHROCYTE [DISTWIDTH] IN BLOOD BY AUTOMATED COUNT: 41.9 FL (ref 35–45)
ERYTHROCYTE [DISTWIDTH] IN BLOOD BY AUTOMATED COUNT: 42.2 FL (ref 35–45)
ERYTHROCYTE [DISTWIDTH] IN BLOOD BY AUTOMATED COUNT: 42.4 FL (ref 35–45)
ERYTHROCYTE [DISTWIDTH] IN BLOOD BY AUTOMATED COUNT: 42.5 FL (ref 35–45)
ERYTHROCYTE [DISTWIDTH] IN BLOOD BY AUTOMATED COUNT: 42.8 FL (ref 35–45)
ERYTHROCYTE [DISTWIDTH] IN BLOOD BY AUTOMATED COUNT: 43 FL (ref 35–45)
ERYTHROCYTE [DISTWIDTH] IN BLOOD BY AUTOMATED COUNT: 43.2 FL (ref 35–45)
ERYTHROCYTE [DISTWIDTH] IN BLOOD BY AUTOMATED COUNT: 43.3 FL (ref 35–45)
ERYTHROCYTE [DISTWIDTH] IN BLOOD BY AUTOMATED COUNT: 43.6 FL (ref 35–45)
EXPIRATORY TIME: NORMAL
FEF 25-75% %PRED-PRE: NORMAL
FEF 25-75% PRED: NORMAL
FEF 25-75%-PRE: NORMAL
FERRITIN: 2364 NG/ML (ref 22–322)
FERRITIN: 2459 NG/ML (ref 22–322)
FERRITIN: 2619 NG/ML (ref 22–322)
FERRITIN: 2753 NG/ML (ref 22–322)
FERRITIN: 2842 NG/ML (ref 22–322)
FERRITIN: 3884 NG/ML (ref 22–322)
FERRITIN: 4657 NG/ML (ref 22–322)
FEV1 %PRED-PRE: 52 %
FEV1 PRED: NORMAL
FEV1/FVC %PRED-PRE: NORMAL
FEV1/FVC PRED: NORMAL
FEV1/FVC: 82 %
FEV1: NORMAL
FVC %PRED-PRE: NORMAL
FVC PRED: NORMAL
FVC: NORMAL
GFR SERPL CREATININE-BSD FRML MDRD: 73 ML/MIN/1.73M2
GFR SERPL CREATININE-BSD FRML MDRD: 82 ML/MIN/1.73M2
GFR SERPL CREATININE-BSD FRML MDRD: > 90 ML/MIN/1.73M2
GLUCOSE BLD-MCNC: 100 MG/DL (ref 70–108)
GLUCOSE BLD-MCNC: 109 MG/DL (ref 70–108)
GLUCOSE BLD-MCNC: 114 MG/DL (ref 70–108)
GLUCOSE BLD-MCNC: 118 MG/DL (ref 70–108)
GLUCOSE BLD-MCNC: 118 MG/DL (ref 70–108)
GLUCOSE BLD-MCNC: 120 MG/DL (ref 70–108)
GLUCOSE BLD-MCNC: 122 MG/DL (ref 70–108)
GLUCOSE BLD-MCNC: 123 MG/DL (ref 70–108)
GLUCOSE BLD-MCNC: 134 MG/DL (ref 70–108)
GLUCOSE BLD-MCNC: 138 MG/DL (ref 70–108)
GLUCOSE BLD-MCNC: 139 MG/DL (ref 70–108)
GLUCOSE BLD-MCNC: 140 MG/DL (ref 70–108)
GLUCOSE BLD-MCNC: 140 MG/DL (ref 70–108)
GLUCOSE BLD-MCNC: 143 MG/DL (ref 70–108)
GLUCOSE BLD-MCNC: 144 MG/DL (ref 70–108)
GLUCOSE BLD-MCNC: 145 MG/DL (ref 70–108)
GLUCOSE BLD-MCNC: 146 MG/DL (ref 70–108)
GLUCOSE BLD-MCNC: 153 MG/DL (ref 70–108)
GLUCOSE BLD-MCNC: 156 MG/DL (ref 70–108)
GLUCOSE BLD-MCNC: 158 MG/DL (ref 70–108)
GLUCOSE BLD-MCNC: 161 MG/DL (ref 70–108)
GLUCOSE BLD-MCNC: 162 MG/DL (ref 70–108)
GLUCOSE BLD-MCNC: 163 MG/DL (ref 70–108)
GLUCOSE BLD-MCNC: 166 MG/DL (ref 70–108)
GLUCOSE BLD-MCNC: 169 MG/DL (ref 70–108)
GLUCOSE BLD-MCNC: 170 MG/DL (ref 70–108)
GLUCOSE BLD-MCNC: 172 MG/DL (ref 70–108)
GLUCOSE BLD-MCNC: 173 MG/DL (ref 70–108)
GLUCOSE BLD-MCNC: 182 MG/DL (ref 70–108)
GLUCOSE BLD-MCNC: 184 MG/DL (ref 70–108)
GLUCOSE BLD-MCNC: 197 MG/DL (ref 70–108)
GLUCOSE BLD-MCNC: 199 MG/DL (ref 70–108)
GLUCOSE BLD-MCNC: 210 MG/DL (ref 70–108)
GLUCOSE BLD-MCNC: 218 MG/DL (ref 70–108)
GLUCOSE BLD-MCNC: 225 MG/DL (ref 70–108)
GLUCOSE BLD-MCNC: 228 MG/DL (ref 70–108)
GLUCOSE BLD-MCNC: 231 MG/DL (ref 70–108)
GLUCOSE BLD-MCNC: 232 MG/DL (ref 70–108)
GLUCOSE BLD-MCNC: 234 MG/DL (ref 70–108)
GLUCOSE BLD-MCNC: 240 MG/DL (ref 70–108)
GLUCOSE BLD-MCNC: 240 MG/DL (ref 70–108)
GLUCOSE BLD-MCNC: 245 MG/DL (ref 70–108)
GLUCOSE BLD-MCNC: 245 MG/DL (ref 70–108)
GLUCOSE BLD-MCNC: 246 MG/DL (ref 70–108)
GLUCOSE BLD-MCNC: 258 MG/DL (ref 70–108)
GLUCOSE BLD-MCNC: 258 MG/DL (ref 70–108)
GLUCOSE BLD-MCNC: 261 MG/DL (ref 70–108)
GLUCOSE BLD-MCNC: 271 MG/DL (ref 70–108)
GLUCOSE BLD-MCNC: 277 MG/DL (ref 70–108)
GLUCOSE BLD-MCNC: 280 MG/DL (ref 70–108)
GLUCOSE BLD-MCNC: 282 MG/DL (ref 70–108)
GLUCOSE BLD-MCNC: 286 MG/DL (ref 70–108)
GLUCOSE BLD-MCNC: 286 MG/DL (ref 70–108)
GLUCOSE BLD-MCNC: 287 MG/DL (ref 70–108)
GLUCOSE BLD-MCNC: 292 MG/DL (ref 70–108)
GLUCOSE BLD-MCNC: 300 MG/DL (ref 70–108)
GLUCOSE BLD-MCNC: 313 MG/DL (ref 70–108)
GLUCOSE BLD-MCNC: 317 MG/DL (ref 70–108)
GLUCOSE BLD-MCNC: 327 MG/DL (ref 70–108)
GLUCOSE BLD-MCNC: 350 MG/DL (ref 70–108)
GLUCOSE BLD-MCNC: 358 MG/DL (ref 70–108)
GLUCOSE BLD-MCNC: 93 MG/DL (ref 70–108)
GLUCOSE BLD-MCNC: 98 MG/DL (ref 70–108)
HBA1C MFR BLD: 7 %
HCO3: 21 MMOL/L (ref 23–28)
HCO3: 26 MMOL/L (ref 23–28)
HCT VFR BLD CALC: 40.6 % (ref 42–52)
HCT VFR BLD CALC: 43.7 % (ref 42–52)
HCT VFR BLD CALC: 44.8 % (ref 42–52)
HCT VFR BLD CALC: 45.1 % (ref 42–52)
HCT VFR BLD CALC: 45.5 % (ref 42–52)
HCT VFR BLD CALC: 46.2 % (ref 42–52)
HCT VFR BLD CALC: 46.3 % (ref 42–52)
HCT VFR BLD CALC: 46.5 % (ref 42–52)
HCT VFR BLD CALC: 47.6 % (ref 42–52)
HCT VFR BLD CALC: 48.2 % (ref 42–52)
HCT VFR BLD CALC: 48.7 % (ref 42–52)
HCT VFR BLD CALC: 49.3 % (ref 42–52)
HCT VFR BLD CALC: 51.7 % (ref 42–52)
HCT VFR BLD CALC: 52.9 % (ref 42–52)
HCT VFR BLD CALC: 52.9 % (ref 42–52)
HEMOGLOBIN: 13.9 GM/DL (ref 14–18)
HEMOGLOBIN: 14.8 GM/DL (ref 14–18)
HEMOGLOBIN: 15.4 GM/DL (ref 14–18)
HEMOGLOBIN: 15.5 GM/DL (ref 14–18)
HEMOGLOBIN: 15.6 GM/DL (ref 14–18)
HEMOGLOBIN: 15.7 GM/DL (ref 14–18)
HEMOGLOBIN: 15.7 GM/DL (ref 14–18)
HEMOGLOBIN: 15.9 GM/DL (ref 14–18)
HEMOGLOBIN: 16.2 GM/DL (ref 14–18)
HEMOGLOBIN: 16.5 GM/DL (ref 14–18)
HEMOGLOBIN: 16.5 GM/DL (ref 14–18)
HEMOGLOBIN: 17.1 GM/DL (ref 14–18)
HEMOGLOBIN: 17.5 GM/DL (ref 14–18)
HEMOGLOBIN: 17.9 GM/DL (ref 14–18)
HEMOGLOBIN: 18 GM/DL (ref 14–18)
IFIO2: 100
IFIO2: 6
IMMATURE GRANS (ABS): 0.01 THOU/MM3 (ref 0–0.07)
IMMATURE GRANS (ABS): 0.03 THOU/MM3 (ref 0–0.07)
IMMATURE GRANS (ABS): 0.04 THOU/MM3 (ref 0–0.07)
IMMATURE GRANS (ABS): 0.05 THOU/MM3 (ref 0–0.07)
IMMATURE GRANS (ABS): 0.05 THOU/MM3 (ref 0–0.07)
IMMATURE GRANS (ABS): 0.07 THOU/MM3 (ref 0–0.07)
IMMATURE GRANS (ABS): 0.07 THOU/MM3 (ref 0–0.07)
IMMATURE GRANS (ABS): 0.08 THOU/MM3 (ref 0–0.07)
IMMATURE GRANS (ABS): 0.1 THOU/MM3 (ref 0–0.07)
IMMATURE GRANS (ABS): 0.12 THOU/MM3 (ref 0–0.07)
IMMATURE GRANS (ABS): 0.13 THOU/MM3 (ref 0–0.07)
IMMATURE GRANS (ABS): 0.17 THOU/MM3 (ref 0–0.07)
IMMATURE GRANULOCYTES: 0.3 %
IMMATURE GRANULOCYTES: 0.4 %
IMMATURE GRANULOCYTES: 0.5 %
IMMATURE GRANULOCYTES: 0.5 %
IMMATURE GRANULOCYTES: 0.6 %
IMMATURE GRANULOCYTES: 0.6 %
IMMATURE GRANULOCYTES: 0.7 %
IMMATURE GRANULOCYTES: 0.7 %
IMMATURE GRANULOCYTES: 0.8 %
IMMATURE GRANULOCYTES: 0.8 %
IMMATURE GRANULOCYTES: 0.9 %
IMMATURE GRANULOCYTES: 1.2 %
INR BLD: 11.13 (ref 0.85–1.13)
INR BLD: 12.13 (ref 0.85–1.13)
INR BLD: 2.01 (ref 0.85–1.13)
INR BLD: 2.06 (ref 0.85–1.13)
INR BLD: 2.11 (ref 0.85–1.13)
INR BLD: 2.79 (ref 0.85–1.13)
INR BLD: 2.85 (ref 0.85–1.13)
INR BLD: 2.99 (ref 0.85–1.13)
INR BLD: 3.03 (ref 0.85–1.13)
INR BLD: 3.23 (ref 0.85–1.13)
INR BLD: 3.24 (ref 0.85–1.13)
INR BLD: 3.32 (ref 0.85–1.13)
INR BLD: 3.37 (ref 0.85–1.13)
INR BLD: 3.42 (ref 0.85–1.13)
INR BLD: 3.55 (ref 0.85–1.13)
LACTIC ACID, SEPSIS: 3.7 MMOL/L (ref 0.5–1.9)
LACTIC ACID: 1.4 MMOL/L (ref 0.5–2)
LACTIC ACID: 3.4 MMOL/L (ref 0.5–2)
LD: 330 U/L (ref 100–190)
LD: 433 U/L (ref 100–190)
LIPASE: 50 U/L (ref 5.6–51.3)
LYMPHOCYTES # BLD: 11.2 %
LYMPHOCYTES # BLD: 11.7 %
LYMPHOCYTES # BLD: 12.1 %
LYMPHOCYTES # BLD: 14.4 %
LYMPHOCYTES # BLD: 35.2 %
LYMPHOCYTES # BLD: 5.4 %
LYMPHOCYTES # BLD: 6.3 %
LYMPHOCYTES # BLD: 6.7 %
LYMPHOCYTES # BLD: 7.3 %
LYMPHOCYTES # BLD: 7.4 %
LYMPHOCYTES # BLD: 8 %
LYMPHOCYTES # BLD: 8.3 %
LYMPHOCYTES # BLD: 8.8 %
LYMPHOCYTES # BLD: 8.8 %
LYMPHOCYTES ABSOLUTE: 0.7 THOU/MM3 (ref 1–4.8)
LYMPHOCYTES ABSOLUTE: 0.7 THOU/MM3 (ref 1–4.8)
LYMPHOCYTES ABSOLUTE: 0.9 THOU/MM3 (ref 1–4.8)
LYMPHOCYTES ABSOLUTE: 1 THOU/MM3 (ref 1–4.8)
LYMPHOCYTES ABSOLUTE: 1.1 THOU/MM3 (ref 1–4.8)
LYMPHOCYTES ABSOLUTE: 1.1 THOU/MM3 (ref 1–4.8)
LYMPHOCYTES ABSOLUTE: 1.2 THOU/MM3 (ref 1–4.8)
LYMPHOCYTES ABSOLUTE: 1.2 THOU/MM3 (ref 1–4.8)
LYMPHOCYTES ABSOLUTE: 1.3 THOU/MM3 (ref 1–4.8)
LYMPHOCYTES ABSOLUTE: 1.5 THOU/MM3 (ref 1–4.8)
LYMPHOCYTES ABSOLUTE: 1.6 THOU/MM3 (ref 1–4.8)
MAGNESIUM: 1.9 MG/DL (ref 1.6–2.4)
MAGNESIUM: 2.2 MG/DL (ref 1.6–2.4)
MAGNESIUM: 2.3 MG/DL (ref 1.6–2.4)
MAGNESIUM: 2.6 MG/DL (ref 1.6–2.4)
MCH RBC QN AUTO: 29.9 PG (ref 26–33)
MCH RBC QN AUTO: 30 PG (ref 26–33)
MCH RBC QN AUTO: 30 PG (ref 26–33)
MCH RBC QN AUTO: 30.2 PG (ref 26–33)
MCH RBC QN AUTO: 30.3 PG (ref 26–33)
MCH RBC QN AUTO: 30.4 PG (ref 26–33)
MCH RBC QN AUTO: 30.4 PG (ref 26–33)
MCH RBC QN AUTO: 30.5 PG (ref 26–33)
MCH RBC QN AUTO: 30.6 PG (ref 26–33)
MCH RBC QN AUTO: 30.7 PG (ref 26–33)
MCH RBC QN AUTO: 30.7 PG (ref 26–33)
MCHC RBC AUTO-ENTMCNC: 33.5 GM/DL (ref 32.2–35.5)
MCHC RBC AUTO-ENTMCNC: 33.6 GM/DL (ref 32.2–35.5)
MCHC RBC AUTO-ENTMCNC: 33.8 GM/DL (ref 32.2–35.5)
MCHC RBC AUTO-ENTMCNC: 33.9 GM/DL (ref 32.2–35.5)
MCHC RBC AUTO-ENTMCNC: 33.9 GM/DL (ref 32.2–35.5)
MCHC RBC AUTO-ENTMCNC: 34 GM/DL (ref 32.2–35.5)
MCHC RBC AUTO-ENTMCNC: 34 GM/DL (ref 32.2–35.5)
MCHC RBC AUTO-ENTMCNC: 34.2 GM/DL (ref 32.2–35.5)
MCHC RBC AUTO-ENTMCNC: 34.3 GM/DL (ref 32.2–35.5)
MCHC RBC AUTO-ENTMCNC: 34.6 GM/DL (ref 32.2–35.5)
MCHC RBC AUTO-ENTMCNC: 34.7 GM/DL (ref 32.2–35.5)
MCHC RBC AUTO-ENTMCNC: 34.7 GM/DL (ref 32.2–35.5)
MCHC RBC AUTO-ENTMCNC: 34.8 GM/DL (ref 32.2–35.5)
MCV RBC AUTO: 87.7 FL (ref 80–94)
MCV RBC AUTO: 88.2 FL (ref 80–94)
MCV RBC AUTO: 88.3 FL (ref 80–94)
MCV RBC AUTO: 88.3 FL (ref 80–94)
MCV RBC AUTO: 88.5 FL (ref 80–94)
MCV RBC AUTO: 88.7 FL (ref 80–94)
MCV RBC AUTO: 88.8 FL (ref 80–94)
MCV RBC AUTO: 89 FL (ref 80–94)
MCV RBC AUTO: 89.1 FL (ref 80–94)
MCV RBC AUTO: 89.3 FL (ref 80–94)
MCV RBC AUTO: 89.4 FL (ref 80–94)
MCV RBC AUTO: 89.5 FL (ref 80–94)
MCV RBC AUTO: 89.9 FL (ref 80–94)
MCV RBC AUTO: 90.1 FL (ref 80–94)
MCV RBC AUTO: 90.6 FL (ref 80–94)
MONOCYTES # BLD: 13.4 %
MONOCYTES # BLD: 4.1 %
MONOCYTES # BLD: 4.2 %
MONOCYTES # BLD: 4.3 %
MONOCYTES # BLD: 4.4 %
MONOCYTES # BLD: 4.7 %
MONOCYTES # BLD: 5.1 %
MONOCYTES # BLD: 5.1 %
MONOCYTES # BLD: 6.7 %
MONOCYTES # BLD: 6.8 %
MONOCYTES # BLD: 7 %
MONOCYTES # BLD: 7.4 %
MONOCYTES # BLD: 8.5 %
MONOCYTES # BLD: 9.1 %
MONOCYTES ABSOLUTE: 0.5 THOU/MM3 (ref 0.4–1.3)
MONOCYTES ABSOLUTE: 0.6 THOU/MM3 (ref 0.4–1.3)
MONOCYTES ABSOLUTE: 0.7 THOU/MM3 (ref 0.4–1.3)
MONOCYTES ABSOLUTE: 0.9 THOU/MM3 (ref 0.4–1.3)
MONOCYTES ABSOLUTE: 0.9 THOU/MM3 (ref 0.4–1.3)
MONOCYTES ABSOLUTE: 1 THOU/MM3 (ref 0.4–1.3)
MONOCYTES ABSOLUTE: 1.1 THOU/MM3 (ref 0.4–1.3)
NUCLEATED RED BLOOD CELLS: 0 /100 WBC
O2 SATURATION: 93 %
O2 SATURATION: 94 %
OSMOLALITY CALCULATION: 276.3 MOSMOL/KG (ref 275–300)
OSMOLALITY CALCULATION: 286.8 MOSMOL/KG (ref 275–300)
PCO2: 30 MMHG (ref 35–45)
PCO2: 36 MMHG (ref 35–45)
PEF %PRED-PRE: NORMAL
PEF PRED: NORMAL
PEF-PRE: NORMAL
PH BLOOD GAS: 7.45 (ref 7.35–7.45)
PH BLOOD GAS: 7.47 (ref 7.35–7.45)
PHOSPHORUS: 2.8 MG/DL (ref 2.4–4.7)
PHOSPHORUS: 2.9 MG/DL (ref 2.4–4.7)
PHOSPHORUS: 3.8 MG/DL (ref 2.4–4.7)
PHOSPHORUS: 4 MG/DL (ref 2.4–4.7)
PLATELET # BLD: 120 THOU/MM3 (ref 130–400)
PLATELET # BLD: 157 THOU/MM3 (ref 130–400)
PLATELET # BLD: 172 THOU/MM3 (ref 130–400)
PLATELET # BLD: 203 THOU/MM3 (ref 130–400)
PLATELET # BLD: 208 THOU/MM3 (ref 130–400)
PLATELET # BLD: 208 THOU/MM3 (ref 130–400)
PLATELET # BLD: 214 THOU/MM3 (ref 130–400)
PLATELET # BLD: 215 THOU/MM3 (ref 130–400)
PLATELET # BLD: 220 THOU/MM3 (ref 130–400)
PLATELET # BLD: 223 THOU/MM3 (ref 130–400)
PLATELET # BLD: 246 THOU/MM3 (ref 130–400)
PLATELET # BLD: 254 THOU/MM3 (ref 130–400)
PLATELET # BLD: 290 THOU/MM3 (ref 130–400)
PLATELET # BLD: 300 THOU/MM3 (ref 130–400)
PLATELET # BLD: 301 THOU/MM3 (ref 130–400)
PLATELET ESTIMATE: ADEQUATE
PMV BLD AUTO: 10 FL (ref 9.4–12.4)
PMV BLD AUTO: 10.1 FL (ref 9.4–12.4)
PMV BLD AUTO: 10.3 FL (ref 9.4–12.4)
PMV BLD AUTO: 10.4 FL (ref 9.4–12.4)
PMV BLD AUTO: 10.5 FL (ref 9.4–12.4)
PMV BLD AUTO: 10.7 FL (ref 9.4–12.4)
PMV BLD AUTO: 10.8 FL (ref 9.4–12.4)
PMV BLD AUTO: 9.8 FL (ref 9.4–12.4)
PMV BLD AUTO: 9.9 FL (ref 9.4–12.4)
PO2: 62 MMHG (ref 71–104)
PO2: 66 MMHG (ref 71–104)
POTASSIUM REFLEX MAGNESIUM: 3.9 MEQ/L (ref 3.5–5.2)
POTASSIUM REFLEX MAGNESIUM: 4.6 MEQ/L (ref 3.5–5.2)
POTASSIUM SERPL-SCNC: 3.3 MEQ/L (ref 3.5–5.2)
POTASSIUM SERPL-SCNC: 3.4 MEQ/L (ref 3.5–5.2)
POTASSIUM SERPL-SCNC: 3.6 MEQ/L (ref 3.5–5.2)
POTASSIUM SERPL-SCNC: 3.8 MEQ/L (ref 3.5–5.2)
POTASSIUM SERPL-SCNC: 3.8 MEQ/L (ref 3.5–5.2)
POTASSIUM SERPL-SCNC: 3.9 MEQ/L (ref 3.5–5.2)
POTASSIUM SERPL-SCNC: 4 MEQ/L (ref 3.5–5.2)
POTASSIUM SERPL-SCNC: 4 MEQ/L (ref 3.5–5.2)
POTASSIUM SERPL-SCNC: 4.1 MEQ/L (ref 3.5–5.2)
POTASSIUM SERPL-SCNC: 4.5 MEQ/L (ref 3.5–5.2)
PRO-BNP: 186.6 PG/ML (ref 0–900)
PRO-BNP: 584.7 PG/ML (ref 0–900)
PROCALCITONIN: 0.08 NG/ML (ref 0.01–0.09)
PROCALCITONIN: 0.1 NG/ML (ref 0.01–0.09)
RBC # BLD: 4.57 MILL/MM3 (ref 4.7–6.1)
RBC # BLD: 4.86 MILL/MM3 (ref 4.7–6.1)
RBC # BLD: 5.05 MILL/MM3 (ref 4.7–6.1)
RBC # BLD: 5.08 MILL/MM3 (ref 4.7–6.1)
RBC # BLD: 5.11 MILL/MM3 (ref 4.7–6.1)
RBC # BLD: 5.19 MILL/MM3 (ref 4.7–6.1)
RBC # BLD: 5.2 MILL/MM3 (ref 4.7–6.1)
RBC # BLD: 5.22 MILL/MM3 (ref 4.7–6.1)
RBC # BLD: 5.39 MILL/MM3 (ref 4.7–6.1)
RBC # BLD: 5.41 MILL/MM3 (ref 4.7–6.1)
RBC # BLD: 5.5 MILL/MM3 (ref 4.7–6.1)
RBC # BLD: 5.62 MILL/MM3 (ref 4.7–6.1)
RBC # BLD: 5.79 MILL/MM3 (ref 4.7–6.1)
RBC # BLD: 5.84 MILL/MM3 (ref 4.7–6.1)
RBC # BLD: 5.91 MILL/MM3 (ref 4.7–6.1)
REASON FOR REJECTION: NORMAL
REASON FOR REJECTION: NORMAL
REJECTED TEST: NORMAL
REJECTED TEST: NORMAL
SARS-COV-2: DETECTED
SCAN OF BLOOD SMEAR: NORMAL
SEG NEUTROPHILS: 50.2 %
SEG NEUTROPHILS: 74 %
SEG NEUTROPHILS: 78.3 %
SEG NEUTROPHILS: 78.9 %
SEG NEUTROPHILS: 80 %
SEG NEUTROPHILS: 83.6 %
SEG NEUTROPHILS: 83.7 %
SEG NEUTROPHILS: 84.7 %
SEG NEUTROPHILS: 86.2 %
SEG NEUTROPHILS: 86.9 %
SEG NEUTROPHILS: 87.2 %
SEG NEUTROPHILS: 87.6 %
SEG NEUTROPHILS: 88.7 %
SEG NEUTROPHILS: 89.2 %
SEGMENTED NEUTROPHILS ABSOLUTE COUNT: 1.8 THOU/MM3 (ref 1.8–7.7)
SEGMENTED NEUTROPHILS ABSOLUTE COUNT: 10.6 THOU/MM3 (ref 1.8–7.7)
SEGMENTED NEUTROPHILS ABSOLUTE COUNT: 10.8 THOU/MM3 (ref 1.8–7.7)
SEGMENTED NEUTROPHILS ABSOLUTE COUNT: 13.1 THOU/MM3 (ref 1.8–7.7)
SEGMENTED NEUTROPHILS ABSOLUTE COUNT: 14.8 THOU/MM3 (ref 1.8–7.7)
SEGMENTED NEUTROPHILS ABSOLUTE COUNT: 17.9 THOU/MM3 (ref 1.8–7.7)
SEGMENTED NEUTROPHILS ABSOLUTE COUNT: 22 THOU/MM3 (ref 1.8–7.7)
SEGMENTED NEUTROPHILS ABSOLUTE COUNT: 6.7 THOU/MM3 (ref 1.8–7.7)
SEGMENTED NEUTROPHILS ABSOLUTE COUNT: 6.9 THOU/MM3 (ref 1.8–7.7)
SEGMENTED NEUTROPHILS ABSOLUTE COUNT: 8 THOU/MM3 (ref 1.8–7.7)
SEGMENTED NEUTROPHILS ABSOLUTE COUNT: 8 THOU/MM3 (ref 1.8–7.7)
SEGMENTED NEUTROPHILS ABSOLUTE COUNT: 8.1 THOU/MM3 (ref 1.8–7.7)
SEGMENTED NEUTROPHILS ABSOLUTE COUNT: 8.3 THOU/MM3 (ref 1.8–7.7)
SEGMENTED NEUTROPHILS ABSOLUTE COUNT: 8.7 THOU/MM3 (ref 1.8–7.7)
SET PEEP: 8 MMHG
SET PRESS SUPP: 22 CMH2O
SODIUM BLD-SCNC: 134 MEQ/L (ref 135–145)
SODIUM BLD-SCNC: 135 MEQ/L (ref 135–145)
SODIUM BLD-SCNC: 135 MEQ/L (ref 135–145)
SODIUM BLD-SCNC: 136 MEQ/L (ref 135–145)
SODIUM BLD-SCNC: 137 MEQ/L (ref 135–145)
SODIUM BLD-SCNC: 137 MEQ/L (ref 135–145)
SODIUM BLD-SCNC: 138 MEQ/L (ref 135–145)
SODIUM BLD-SCNC: 139 MEQ/L (ref 135–145)
SODIUM BLD-SCNC: 140 MEQ/L (ref 135–145)
SOURCE, BLOOD GAS: ABNORMAL
SOURCE, BLOOD GAS: ABNORMAL
TOTAL CK: 107 U/L (ref 55–170)
TOTAL PROTEIN: 5.6 G/DL (ref 6.1–8)
TOTAL PROTEIN: 5.9 G/DL (ref 6.1–8)
TOTAL PROTEIN: 6 G/DL (ref 6.1–8)
TOTAL PROTEIN: 6.1 G/DL (ref 6.1–8)
TOTAL PROTEIN: 6.2 G/DL (ref 6.1–8)
TOTAL PROTEIN: 6.2 G/DL (ref 6.1–8)
TOTAL PROTEIN: 6.3 G/DL (ref 6.1–8)
TOTAL PROTEIN: 6.6 G/DL (ref 6.1–8)
TOTAL PROTEIN: 6.6 G/DL (ref 6.1–8)
TOTAL PROTEIN: 6.7 G/DL (ref 6.1–8)
TOTAL PROTEIN: 6.8 G/DL (ref 6.1–8)
TOTAL PROTEIN: 6.9 G/DL (ref 6.1–8)
TOTAL PROTEIN: 6.9 G/DL (ref 6.1–8)
TOTAL PROTEIN: 7 G/DL (ref 6.1–8)
TROPONIN T: < 0.01 NG/ML
WBC # BLD: 10.1 THOU/MM3 (ref 4.8–10.8)
WBC # BLD: 10.4 THOU/MM3 (ref 4.8–10.8)
WBC # BLD: 10.6 THOU/MM3 (ref 4.8–10.8)
WBC # BLD: 11 THOU/MM3 (ref 4.8–10.8)
WBC # BLD: 12.5 THOU/MM3 (ref 4.8–10.8)
WBC # BLD: 12.5 THOU/MM3 (ref 4.8–10.8)
WBC # BLD: 15 THOU/MM3 (ref 4.8–10.8)
WBC # BLD: 16.7 THOU/MM3 (ref 4.8–10.8)
WBC # BLD: 20.6 THOU/MM3 (ref 4.8–10.8)
WBC # BLD: 24.7 THOU/MM3 (ref 4.8–10.8)
WBC # BLD: 3.5 THOU/MM3 (ref 4.8–10.8)
WBC # BLD: 8.2 THOU/MM3 (ref 4.8–10.8)
WBC # BLD: 8.4 THOU/MM3 (ref 4.8–10.8)
WBC # BLD: 9.2 THOU/MM3 (ref 4.8–10.8)
WBC # BLD: 9.3 THOU/MM3 (ref 4.8–10.8)

## 2021-01-01 PROCEDURE — 84484 ASSAY OF TROPONIN QUANT: CPT

## 2021-01-01 PROCEDURE — 85610 PROTHROMBIN TIME: CPT

## 2021-01-01 PROCEDURE — 6360000002 HC RX W HCPCS: Performed by: INTERNAL MEDICINE

## 2021-01-01 PROCEDURE — 94640 AIRWAY INHALATION TREATMENT: CPT

## 2021-01-01 PROCEDURE — 2700000000 HC OXYGEN THERAPY PER DAY

## 2021-01-01 PROCEDURE — 80053 COMPREHEN METABOLIC PANEL: CPT

## 2021-01-01 PROCEDURE — 97530 THERAPEUTIC ACTIVITIES: CPT

## 2021-01-01 PROCEDURE — 6370000000 HC RX 637 (ALT 250 FOR IP): Performed by: INTERNAL MEDICINE

## 2021-01-01 PROCEDURE — 4040F PNEUMOC VAC/ADMIN/RCVD: CPT | Performed by: NURSE PRACTITIONER

## 2021-01-01 PROCEDURE — 1123F ACP DISCUSS/DSCN MKR DOCD: CPT | Performed by: NURSE PRACTITIONER

## 2021-01-01 PROCEDURE — 99214 OFFICE O/P EST MOD 30 MIN: CPT | Performed by: NURSE PRACTITIONER

## 2021-01-01 PROCEDURE — 6360000002 HC RX W HCPCS: Performed by: FAMILY MEDICINE

## 2021-01-01 PROCEDURE — 6370000000 HC RX 637 (ALT 250 FOR IP): Performed by: PHARMACIST

## 2021-01-01 PROCEDURE — 6370000000 HC RX 637 (ALT 250 FOR IP)

## 2021-01-01 PROCEDURE — 94761 N-INVAS EAR/PLS OXIMETRY MLT: CPT

## 2021-01-01 PROCEDURE — 82948 REAGENT STRIP/BLOOD GLUCOSE: CPT

## 2021-01-01 PROCEDURE — 99285 EMERGENCY DEPT VISIT HI MDM: CPT

## 2021-01-01 PROCEDURE — 6370000000 HC RX 637 (ALT 250 FOR IP): Performed by: FAMILY MEDICINE

## 2021-01-01 PROCEDURE — 97165 OT EVAL LOW COMPLEX 30 MIN: CPT

## 2021-01-01 PROCEDURE — 1036F TOBACCO NON-USER: CPT | Performed by: NURSE PRACTITIONER

## 2021-01-01 PROCEDURE — 96360 HYDRATION IV INFUSION INIT: CPT

## 2021-01-01 PROCEDURE — 85379 FIBRIN DEGRADATION QUANT: CPT

## 2021-01-01 PROCEDURE — 2580000003 HC RX 258: Performed by: FAMILY MEDICINE

## 2021-01-01 PROCEDURE — 83690 ASSAY OF LIPASE: CPT

## 2021-01-01 PROCEDURE — 85730 THROMBOPLASTIN TIME PARTIAL: CPT

## 2021-01-01 PROCEDURE — 36415 COLL VENOUS BLD VENIPUNCTURE: CPT

## 2021-01-01 PROCEDURE — 36600 WITHDRAWAL OF ARTERIAL BLOOD: CPT

## 2021-01-01 PROCEDURE — 6370000000 HC RX 637 (ALT 250 FOR IP): Performed by: EMERGENCY MEDICINE

## 2021-01-01 PROCEDURE — 94669 MECHANICAL CHEST WALL OSCILL: CPT

## 2021-01-01 PROCEDURE — 3051F HG A1C>EQUAL 7.0%<8.0%: CPT | Performed by: FAMILY MEDICINE

## 2021-01-01 PROCEDURE — 87040 BLOOD CULTURE FOR BACTERIA: CPT

## 2021-01-01 PROCEDURE — 85025 COMPLETE CBC W/AUTO DIFF WBC: CPT

## 2021-01-01 PROCEDURE — 83735 ASSAY OF MAGNESIUM: CPT

## 2021-01-01 PROCEDURE — 99213 OFFICE O/P EST LOW 20 MIN: CPT | Performed by: FAMILY MEDICINE

## 2021-01-01 PROCEDURE — 2140000000 HC CCU INTERMEDIATE R&B

## 2021-01-01 PROCEDURE — 2500000003 HC RX 250 WO HCPCS: Performed by: INTERNAL MEDICINE

## 2021-01-01 PROCEDURE — 84132 ASSAY OF SERUM POTASSIUM: CPT

## 2021-01-01 PROCEDURE — 97110 THERAPEUTIC EXERCISES: CPT

## 2021-01-01 PROCEDURE — 83036 HEMOGLOBIN GLYCOSYLATED A1C: CPT | Performed by: FAMILY MEDICINE

## 2021-01-01 PROCEDURE — 82728 ASSAY OF FERRITIN: CPT

## 2021-01-01 PROCEDURE — 93005 ELECTROCARDIOGRAM TRACING: CPT | Performed by: PHYSICIAN ASSISTANT

## 2021-01-01 PROCEDURE — 94660 CPAP INITIATION&MGMT: CPT

## 2021-01-01 PROCEDURE — U0003 INFECTIOUS AGENT DETECTION BY NUCLEIC ACID (DNA OR RNA); SEVERE ACUTE RESPIRATORY SYNDROME CORONAVIRUS 2 (SARS-COV-2) (CORONAVIRUS DISEASE [COVID-19]), AMPLIFIED PROBE TECHNIQUE, MAKING USE OF HIGH THROUGHPUT TECHNOLOGIES AS DESCRIBED BY CMS-2020-01-R: HCPCS

## 2021-01-01 PROCEDURE — 99233 SBSQ HOSP IP/OBS HIGH 50: CPT | Performed by: INTERNAL MEDICINE

## 2021-01-01 PROCEDURE — 84100 ASSAY OF PHOSPHORUS: CPT

## 2021-01-01 PROCEDURE — 83615 LACTATE (LD) (LDH) ENZYME: CPT

## 2021-01-01 PROCEDURE — 3023F SPIROM DOC REV: CPT | Performed by: NURSE PRACTITIONER

## 2021-01-01 PROCEDURE — 71045 X-RAY EXAM CHEST 1 VIEW: CPT

## 2021-01-01 PROCEDURE — G8417 CALC BMI ABV UP PARAM F/U: HCPCS | Performed by: NURSE PRACTITIONER

## 2021-01-01 PROCEDURE — 6360000002 HC RX W HCPCS

## 2021-01-01 PROCEDURE — 99284 EMERGENCY DEPT VISIT MOD MDM: CPT

## 2021-01-01 PROCEDURE — U0005 INFEC AGEN DETEC AMPLI PROBE: HCPCS

## 2021-01-01 PROCEDURE — 97535 SELF CARE MNGMENT TRAINING: CPT

## 2021-01-01 PROCEDURE — 85027 COMPLETE CBC AUTOMATED: CPT

## 2021-01-01 PROCEDURE — 82248 BILIRUBIN DIRECT: CPT

## 2021-01-01 PROCEDURE — 93010 ELECTROCARDIOGRAM REPORT: CPT | Performed by: INTERNAL MEDICINE

## 2021-01-01 PROCEDURE — 97116 GAIT TRAINING THERAPY: CPT

## 2021-01-01 PROCEDURE — 83605 ASSAY OF LACTIC ACID: CPT

## 2021-01-01 PROCEDURE — 84145 PROCALCITONIN (PCT): CPT

## 2021-01-01 PROCEDURE — 99223 1ST HOSP IP/OBS HIGH 75: CPT | Performed by: FAMILY MEDICINE

## 2021-01-01 PROCEDURE — 93297 REM INTERROG DEV EVAL ICPMS: CPT | Performed by: NUCLEAR MEDICINE

## 2021-01-01 PROCEDURE — 94010 BREATHING CAPACITY TEST: CPT | Performed by: NURSE PRACTITIONER

## 2021-01-01 PROCEDURE — 1200000003 HC TELEMETRY R&B

## 2021-01-01 PROCEDURE — G8427 DOCREV CUR MEDS BY ELIG CLIN: HCPCS | Performed by: NURSE PRACTITIONER

## 2021-01-01 PROCEDURE — 83880 ASSAY OF NATRIURETIC PEPTIDE: CPT

## 2021-01-01 PROCEDURE — G2066 INTER DEVC REMOTE 30D: HCPCS | Performed by: NUCLEAR MEDICINE

## 2021-01-01 PROCEDURE — 3017F COLORECTAL CA SCREEN DOC REV: CPT | Performed by: FAMILY MEDICINE

## 2021-01-01 PROCEDURE — 6370000000 HC RX 637 (ALT 250 FOR IP): Performed by: STUDENT IN AN ORGANIZED HEALTH CARE EDUCATION/TRAINING PROGRAM

## 2021-01-01 PROCEDURE — 86140 C-REACTIVE PROTEIN: CPT

## 2021-01-01 PROCEDURE — 3017F COLORECTAL CA SCREEN DOC REV: CPT | Performed by: NURSE PRACTITIONER

## 2021-01-01 PROCEDURE — 82550 ASSAY OF CK (CPK): CPT

## 2021-01-01 PROCEDURE — G8484 FLU IMMUNIZE NO ADMIN: HCPCS | Performed by: FAMILY MEDICINE

## 2021-01-01 PROCEDURE — G8925 SPIR FEV1/FVC>=60% & NO COPD: HCPCS | Performed by: NURSE PRACTITIONER

## 2021-01-01 PROCEDURE — 4040F PNEUMOC VAC/ADMIN/RCVD: CPT | Performed by: FAMILY MEDICINE

## 2021-01-01 PROCEDURE — G8484 FLU IMMUNIZE NO ADMIN: HCPCS | Performed by: NURSE PRACTITIONER

## 2021-01-01 PROCEDURE — 2580000003 HC RX 258: Performed by: INTERNAL MEDICINE

## 2021-01-01 PROCEDURE — 82803 BLOOD GASES ANY COMBINATION: CPT

## 2021-01-01 PROCEDURE — 1123F ACP DISCUSS/DSCN MKR DOCD: CPT | Performed by: FAMILY MEDICINE

## 2021-01-01 PROCEDURE — G0439 PPPS, SUBSEQ VISIT: HCPCS | Performed by: FAMILY MEDICINE

## 2021-01-01 PROCEDURE — 97163 PT EVAL HIGH COMPLEX 45 MIN: CPT

## 2021-01-01 PROCEDURE — 93281 PM DEVICE PROGR EVAL MULTI: CPT | Performed by: NUCLEAR MEDICINE

## 2021-01-01 PROCEDURE — 2580000003 HC RX 258: Performed by: EMERGENCY MEDICINE

## 2021-01-01 PROCEDURE — 2580000003 HC RX 258: Performed by: STUDENT IN AN ORGANIZED HEALTH CARE EDUCATION/TRAINING PROGRAM

## 2021-01-01 PROCEDURE — 93005 ELECTROCARDIOGRAM TRACING: CPT | Performed by: STUDENT IN AN ORGANIZED HEALTH CARE EDUCATION/TRAINING PROGRAM

## 2021-01-01 PROCEDURE — 99239 HOSP IP/OBS DSCHRG MGMT >30: CPT | Performed by: INTERNAL MEDICINE

## 2021-01-01 RX ORDER — ATORVASTATIN CALCIUM 80 MG/1
TABLET, FILM COATED ORAL
Qty: 90 TABLET | Refills: 0 | OUTPATIENT
Start: 2021-01-01

## 2021-01-01 RX ORDER — LANOLIN ALCOHOL/MO/W.PET/CERES
3 CREAM (GRAM) TOPICAL NIGHTLY PRN
Status: DISCONTINUED | OUTPATIENT
Start: 2021-01-01 | End: 2021-01-01 | Stop reason: HOSPADM

## 2021-01-01 RX ORDER — DEXTROSE MONOHYDRATE 50 MG/ML
100 INJECTION, SOLUTION INTRAVENOUS PRN
Status: DISCONTINUED | OUTPATIENT
Start: 2021-01-01 | End: 2021-01-01 | Stop reason: HOSPADM

## 2021-01-01 RX ORDER — POTASSIUM CHLORIDE 7.45 MG/ML
10 INJECTION INTRAVENOUS PRN
Status: DISCONTINUED | OUTPATIENT
Start: 2021-01-01 | End: 2021-01-01 | Stop reason: HOSPADM

## 2021-01-01 RX ORDER — FAMOTIDINE 20 MG/1
20 TABLET, FILM COATED ORAL 2 TIMES DAILY
Status: DISCONTINUED | OUTPATIENT
Start: 2021-01-01 | End: 2021-01-01 | Stop reason: HOSPADM

## 2021-01-01 RX ORDER — PROMETHAZINE HYDROCHLORIDE 25 MG/1
12.5 TABLET ORAL EVERY 6 HOURS PRN
Status: DISCONTINUED | OUTPATIENT
Start: 2021-01-01 | End: 2021-01-01 | Stop reason: HOSPADM

## 2021-01-01 RX ORDER — HYDROCHLOROTHIAZIDE 25 MG/1
25 TABLET ORAL EVERY MORNING
Qty: 90 TABLET | Refills: 2 | Status: SHIPPED | OUTPATIENT
Start: 2021-01-01

## 2021-01-01 RX ORDER — ACETAMINOPHEN 650 MG/1
650 SUPPOSITORY RECTAL EVERY 6 HOURS PRN
Status: DISCONTINUED | OUTPATIENT
Start: 2021-01-01 | End: 2021-01-01 | Stop reason: HOSPADM

## 2021-01-01 RX ORDER — SODIUM CHLORIDE 9 MG/ML
25 INJECTION, SOLUTION INTRAVENOUS PRN
Status: DISCONTINUED | OUTPATIENT
Start: 2021-01-01 | End: 2021-01-01 | Stop reason: HOSPADM

## 2021-01-01 RX ORDER — ASPIRIN 81 MG/1
81 TABLET ORAL DAILY
Status: DISCONTINUED | OUTPATIENT
Start: 2021-01-01 | End: 2021-01-01 | Stop reason: HOSPADM

## 2021-01-01 RX ORDER — SODIUM CHLORIDE 9 MG/ML
INJECTION, SOLUTION INTRAVENOUS CONTINUOUS
Status: DISCONTINUED | OUTPATIENT
Start: 2021-01-01 | End: 2021-01-01 | Stop reason: HOSPADM

## 2021-01-01 RX ORDER — ATORVASTATIN CALCIUM 80 MG/1
80 TABLET, FILM COATED ORAL NIGHTLY
Status: DISCONTINUED | OUTPATIENT
Start: 2021-01-01 | End: 2021-01-01 | Stop reason: HOSPADM

## 2021-01-01 RX ORDER — ATORVASTATIN CALCIUM 80 MG/1
80 TABLET, FILM COATED ORAL DAILY
Qty: 90 TABLET | Refills: 1 | Status: SHIPPED | OUTPATIENT
Start: 2021-01-01 | End: 2021-01-01

## 2021-01-01 RX ORDER — HEPARIN SODIUM 1000 [USP'U]/ML
80 INJECTION, SOLUTION INTRAVENOUS; SUBCUTANEOUS PRN
Status: DISCONTINUED | OUTPATIENT
Start: 2021-01-01 | End: 2021-01-01

## 2021-01-01 RX ORDER — MORPHINE SULFATE 2 MG/ML
2 INJECTION, SOLUTION INTRAMUSCULAR; INTRAVENOUS ONCE
Status: COMPLETED | OUTPATIENT
Start: 2021-01-01 | End: 2021-01-01

## 2021-01-01 RX ORDER — ATORVASTATIN CALCIUM 80 MG/1
TABLET, FILM COATED ORAL
Qty: 90 TABLET | Refills: 1 | Status: SHIPPED | OUTPATIENT
Start: 2021-01-01

## 2021-01-01 RX ORDER — WARFARIN SODIUM 5 MG/1
TABLET ORAL
Qty: 90 TABLET | Refills: 0 | Status: SHIPPED | OUTPATIENT
Start: 2021-01-01 | End: 2021-01-01 | Stop reason: SDUPTHER

## 2021-01-01 RX ORDER — INSULIN GLARGINE 100 [IU]/ML
35 INJECTION, SOLUTION SUBCUTANEOUS
Status: DISCONTINUED | OUTPATIENT
Start: 2021-01-01 | End: 2021-01-01

## 2021-01-01 RX ORDER — MORPHINE SULFATE 2 MG/ML
2 INJECTION, SOLUTION INTRAMUSCULAR; INTRAVENOUS
Status: DISCONTINUED | OUTPATIENT
Start: 2021-01-01 | End: 2021-01-01 | Stop reason: HOSPADM

## 2021-01-01 RX ORDER — METOPROLOL TARTRATE 50 MG/1
50 TABLET, FILM COATED ORAL 2 TIMES DAILY
Status: DISCONTINUED | OUTPATIENT
Start: 2021-01-01 | End: 2021-01-01 | Stop reason: HOSPADM

## 2021-01-01 RX ORDER — LOSARTAN POTASSIUM 25 MG/1
25 TABLET ORAL DAILY
Qty: 90 TABLET | Refills: 1 | Status: SHIPPED | OUTPATIENT
Start: 2021-01-01

## 2021-01-01 RX ORDER — INSULIN GLARGINE 100 [IU]/ML
10 INJECTION, SOLUTION SUBCUTANEOUS
Status: DISCONTINUED | OUTPATIENT
Start: 2021-01-01 | End: 2021-01-01

## 2021-01-01 RX ORDER — NICOTINE POLACRILEX 4 MG
15 LOZENGE BUCCAL PRN
Status: DISCONTINUED | OUTPATIENT
Start: 2021-01-01 | End: 2021-01-01 | Stop reason: HOSPADM

## 2021-01-01 RX ORDER — IPRATROPIUM BROMIDE AND ALBUTEROL SULFATE 2.5; .5 MG/3ML; MG/3ML
1 SOLUTION RESPIRATORY (INHALATION) EVERY 4 HOURS
Status: DISCONTINUED | OUTPATIENT
Start: 2021-01-01 | End: 2021-01-01 | Stop reason: HOSPADM

## 2021-01-01 RX ORDER — METOPROLOL TARTRATE 50 MG/1
50 TABLET, FILM COATED ORAL 2 TIMES DAILY
Qty: 180 TABLET | Refills: 1 | Status: SHIPPED | OUTPATIENT
Start: 2021-01-01

## 2021-01-01 RX ORDER — ALBUTEROL SULFATE 90 UG/1
2 AEROSOL, METERED RESPIRATORY (INHALATION) EVERY 6 HOURS PRN
Qty: 1 INHALER | Refills: 5 | Status: SHIPPED | OUTPATIENT
Start: 2021-01-01

## 2021-01-01 RX ORDER — DEXAMETHASONE 4 MG/1
6 TABLET ORAL DAILY
Status: DISCONTINUED | OUTPATIENT
Start: 2021-01-01 | End: 2021-01-01 | Stop reason: HOSPADM

## 2021-01-01 RX ORDER — LORAZEPAM 2 MG/ML
2 INJECTION INTRAMUSCULAR
Status: DISCONTINUED | OUTPATIENT
Start: 2021-01-01 | End: 2021-01-01 | Stop reason: HOSPADM

## 2021-01-01 RX ORDER — 0.9 % SODIUM CHLORIDE 0.9 %
250 INTRAVENOUS SOLUTION INTRAVENOUS ONCE
Status: COMPLETED | OUTPATIENT
Start: 2021-01-01 | End: 2021-01-01

## 2021-01-01 RX ORDER — WARFARIN SODIUM 2 MG/1
2 TABLET ORAL ONCE
Status: DISCONTINUED | OUTPATIENT
Start: 2021-01-01 | End: 2021-01-01 | Stop reason: HOSPADM

## 2021-01-01 RX ORDER — HYDROCHLOROTHIAZIDE 25 MG/1
25 TABLET ORAL EVERY MORNING
Status: DISCONTINUED | OUTPATIENT
Start: 2021-01-01 | End: 2021-01-01 | Stop reason: HOSPADM

## 2021-01-01 RX ORDER — SODIUM CHLORIDE 0.9 % (FLUSH) 0.9 %
10 SYRINGE (ML) INJECTION EVERY 12 HOURS SCHEDULED
Status: DISCONTINUED | OUTPATIENT
Start: 2021-01-01 | End: 2021-01-01 | Stop reason: HOSPADM

## 2021-01-01 RX ORDER — IPRATROPIUM BROMIDE AND ALBUTEROL SULFATE 2.5; .5 MG/3ML; MG/3ML
1 SOLUTION RESPIRATORY (INHALATION) EVERY 4 HOURS PRN
Status: DISCONTINUED | OUTPATIENT
Start: 2021-01-01 | End: 2021-01-01

## 2021-01-01 RX ORDER — WARFARIN SODIUM 5 MG/1
TABLET ORAL
Qty: 90 TABLET | Refills: 3 | Status: ON HOLD | OUTPATIENT
Start: 2021-01-01 | End: 2021-01-01

## 2021-01-01 RX ORDER — ZINC SULFATE 50(220)MG
50 CAPSULE ORAL DAILY
Status: DISCONTINUED | OUTPATIENT
Start: 2021-01-01 | End: 2021-01-01 | Stop reason: HOSPADM

## 2021-01-01 RX ORDER — FLUTICASONE PROPIONATE 50 MCG
1 SPRAY, SUSPENSION (ML) NASAL DAILY
Status: DISCONTINUED | OUTPATIENT
Start: 2021-01-01 | End: 2021-01-01 | Stop reason: HOSPADM

## 2021-01-01 RX ORDER — HEPARIN SODIUM 1000 [USP'U]/ML
40 INJECTION, SOLUTION INTRAVENOUS; SUBCUTANEOUS PRN
Status: DISCONTINUED | OUTPATIENT
Start: 2021-01-01 | End: 2021-01-01

## 2021-01-01 RX ORDER — WARFARIN SODIUM 2 MG/1
2 TABLET ORAL ONCE
Status: COMPLETED | OUTPATIENT
Start: 2021-01-01 | End: 2021-01-01

## 2021-01-01 RX ORDER — MAGNESIUM SULFATE IN WATER 40 MG/ML
2000 INJECTION, SOLUTION INTRAVENOUS PRN
Status: DISCONTINUED | OUTPATIENT
Start: 2021-01-01 | End: 2021-01-01 | Stop reason: HOSPADM

## 2021-01-01 RX ORDER — NOREPINEPHRINE BIT/0.9 % NACL 16MG/250ML
INFUSION BOTTLE (ML) INTRAVENOUS
Status: DISPENSED
Start: 2021-01-01 | End: 2021-01-01

## 2021-01-01 RX ORDER — LOSARTAN POTASSIUM 25 MG/1
25 TABLET ORAL DAILY
Status: DISCONTINUED | OUTPATIENT
Start: 2021-01-01 | End: 2021-01-01 | Stop reason: HOSPADM

## 2021-01-01 RX ORDER — VITAMIN B COMPLEX
1000 TABLET ORAL DAILY
Status: DISCONTINUED | OUTPATIENT
Start: 2021-01-01 | End: 2021-01-01 | Stop reason: HOSPADM

## 2021-01-01 RX ORDER — DEXAMETHASONE SODIUM PHOSPHATE 4 MG/ML
6 INJECTION, SOLUTION INTRA-ARTICULAR; INTRALESIONAL; INTRAMUSCULAR; INTRAVENOUS; SOFT TISSUE DAILY
Status: DISCONTINUED | OUTPATIENT
Start: 2021-01-01 | End: 2021-01-01

## 2021-01-01 RX ORDER — HEPARIN SODIUM 1000 [USP'U]/ML
80 INJECTION, SOLUTION INTRAVENOUS; SUBCUTANEOUS ONCE
Status: DISCONTINUED | OUTPATIENT
Start: 2021-01-01 | End: 2021-01-01

## 2021-01-01 RX ORDER — WARFARIN SODIUM 5 MG/1
5 TABLET ORAL ONCE
Status: COMPLETED | OUTPATIENT
Start: 2021-01-01 | End: 2021-01-01

## 2021-01-01 RX ORDER — DEXAMETHASONE 4 MG/1
6 TABLET ORAL DAILY
Status: COMPLETED | OUTPATIENT
Start: 2021-01-01 | End: 2021-01-01

## 2021-01-01 RX ORDER — WARFARIN SODIUM 3 MG/1
3 TABLET ORAL
Status: COMPLETED | OUTPATIENT
Start: 2021-01-01 | End: 2021-01-01

## 2021-01-01 RX ORDER — FUROSEMIDE 10 MG/ML
40 INJECTION INTRAMUSCULAR; INTRAVENOUS DAILY
Status: DISCONTINUED | OUTPATIENT
Start: 2021-01-01 | End: 2021-01-01 | Stop reason: HOSPADM

## 2021-01-01 RX ORDER — DEXAMETHASONE 6 MG/1
6 TABLET ORAL 2 TIMES DAILY WITH MEALS
Qty: 12 TABLET | Refills: 0 | Status: ON HOLD | OUTPATIENT
Start: 2021-01-01 | End: 2021-01-01

## 2021-01-01 RX ORDER — POTASSIUM CHLORIDE 20 MEQ/1
20 TABLET, EXTENDED RELEASE ORAL 2 TIMES DAILY
Qty: 6 TABLET | Refills: 0 | Status: SHIPPED | OUTPATIENT
Start: 2021-01-01 | End: 2021-01-01

## 2021-01-01 RX ORDER — IPRATROPIUM BROMIDE AND ALBUTEROL SULFATE 2.5; .5 MG/3ML; MG/3ML
1 SOLUTION RESPIRATORY (INHALATION) ONCE
Status: COMPLETED | OUTPATIENT
Start: 2021-01-01 | End: 2021-01-01

## 2021-01-01 RX ORDER — ACETAMINOPHEN 325 MG/1
650 TABLET ORAL EVERY 6 HOURS PRN
Status: DISCONTINUED | OUTPATIENT
Start: 2021-01-01 | End: 2021-01-01 | Stop reason: HOSPADM

## 2021-01-01 RX ORDER — MIDAZOLAM HYDROCHLORIDE 1 MG/ML
INJECTION INTRAMUSCULAR; INTRAVENOUS
Status: COMPLETED
Start: 2021-01-01 | End: 2021-01-01

## 2021-01-01 RX ORDER — INSULIN GLARGINE 100 [IU]/ML
20 INJECTION, SOLUTION SUBCUTANEOUS
Status: DISCONTINUED | OUTPATIENT
Start: 2021-01-01 | End: 2021-01-01

## 2021-01-01 RX ORDER — WARFARIN SODIUM 1 MG/1
1 TABLET ORAL
Status: COMPLETED | OUTPATIENT
Start: 2021-01-01 | End: 2021-01-01

## 2021-01-01 RX ORDER — HEPARIN SODIUM 10000 [USP'U]/100ML
5-30 INJECTION, SOLUTION INTRAVENOUS CONTINUOUS
Status: DISCONTINUED | OUTPATIENT
Start: 2021-01-01 | End: 2021-01-01

## 2021-01-01 RX ORDER — POTASSIUM CHLORIDE 20 MEQ/1
40 TABLET, EXTENDED RELEASE ORAL PRN
Status: DISCONTINUED | OUTPATIENT
Start: 2021-01-01 | End: 2021-01-01 | Stop reason: HOSPADM

## 2021-01-01 RX ORDER — MORPHINE SULFATE 4 MG/ML
6 INJECTION, SOLUTION INTRAMUSCULAR; INTRAVENOUS
Status: COMPLETED | OUTPATIENT
Start: 2021-01-01 | End: 2021-01-01

## 2021-01-01 RX ORDER — MORPHINE SULFATE 4 MG/ML
4 INJECTION, SOLUTION INTRAMUSCULAR; INTRAVENOUS ONCE
Status: COMPLETED | OUTPATIENT
Start: 2021-01-01 | End: 2021-01-01

## 2021-01-01 RX ORDER — WARFARIN SODIUM 5 MG/1
5 TABLET ORAL DAILY
Status: DISCONTINUED | OUTPATIENT
Start: 2021-01-01 | End: 2021-01-01 | Stop reason: ALTCHOICE

## 2021-01-01 RX ORDER — FUROSEMIDE 10 MG/ML
40 INJECTION INTRAMUSCULAR; INTRAVENOUS ONCE
Status: COMPLETED | OUTPATIENT
Start: 2021-01-01 | End: 2021-01-01

## 2021-01-01 RX ORDER — MIDAZOLAM HYDROCHLORIDE 1 MG/ML
4 INJECTION INTRAMUSCULAR; INTRAVENOUS ONCE
Status: COMPLETED | OUTPATIENT
Start: 2021-01-01 | End: 2021-01-01

## 2021-01-01 RX ORDER — WARFARIN SODIUM 3 MG/1
6 TABLET ORAL ONCE
Status: COMPLETED | OUTPATIENT
Start: 2021-01-01 | End: 2021-01-01

## 2021-01-01 RX ORDER — POTASSIUM CHLORIDE 20 MEQ/1
40 TABLET, EXTENDED RELEASE ORAL ONCE
Status: COMPLETED | OUTPATIENT
Start: 2021-01-01 | End: 2021-01-01

## 2021-01-01 RX ORDER — SODIUM CHLORIDE 0.9 % (FLUSH) 0.9 %
10 SYRINGE (ML) INJECTION PRN
Status: DISCONTINUED | OUTPATIENT
Start: 2021-01-01 | End: 2021-01-01 | Stop reason: HOSPADM

## 2021-01-01 RX ORDER — DOXYCYCLINE HYCLATE 100 MG/1
100 CAPSULE ORAL 2 TIMES DAILY
Status: ON HOLD | COMMUNITY
Start: 2021-01-01 | End: 2021-01-01

## 2021-01-01 RX ORDER — INSULIN GLARGINE 100 [IU]/ML
40 INJECTION, SOLUTION SUBCUTANEOUS
Status: DISCONTINUED | OUTPATIENT
Start: 2021-01-01 | End: 2021-01-01 | Stop reason: HOSPADM

## 2021-01-01 RX ORDER — POLYETHYLENE GLYCOL 3350 17 G/17G
17 POWDER, FOR SOLUTION ORAL DAILY PRN
Status: DISCONTINUED | OUTPATIENT
Start: 2021-01-01 | End: 2021-01-01 | Stop reason: HOSPADM

## 2021-01-01 RX ORDER — DEXTROSE MONOHYDRATE 25 G/50ML
12.5 INJECTION, SOLUTION INTRAVENOUS PRN
Status: DISCONTINUED | OUTPATIENT
Start: 2021-01-01 | End: 2021-01-01 | Stop reason: HOSPADM

## 2021-01-01 RX ORDER — ALLOPURINOL 300 MG/1
300 TABLET ORAL DAILY
Status: DISCONTINUED | OUTPATIENT
Start: 2021-01-01 | End: 2021-01-01 | Stop reason: HOSPADM

## 2021-01-01 RX ORDER — ONDANSETRON 2 MG/ML
4 INJECTION INTRAMUSCULAR; INTRAVENOUS EVERY 6 HOURS PRN
Status: DISCONTINUED | OUTPATIENT
Start: 2021-01-01 | End: 2021-01-01 | Stop reason: HOSPADM

## 2021-01-01 RX ORDER — 0.9 % SODIUM CHLORIDE 0.9 %
500 INTRAVENOUS SOLUTION INTRAVENOUS ONCE
Status: COMPLETED | OUTPATIENT
Start: 2021-01-01 | End: 2021-01-01

## 2021-01-01 RX ORDER — SENNA PLUS 8.6 MG/1
2 TABLET ORAL ONCE
Status: COMPLETED | OUTPATIENT
Start: 2021-01-01 | End: 2021-01-01

## 2021-01-01 RX ORDER — TIOTROPIUM BROMIDE AND OLODATEROL 3.124; 2.736 UG/1; UG/1
SPRAY, METERED RESPIRATORY (INHALATION)
Qty: 12 G | Refills: 3 | Status: SHIPPED | OUTPATIENT
Start: 2021-01-01

## 2021-01-01 RX ORDER — BISACODYL 10 MG
10 SUPPOSITORY, RECTAL RECTAL DAILY PRN
Status: DISCONTINUED | OUTPATIENT
Start: 2021-01-01 | End: 2021-01-01 | Stop reason: HOSPADM

## 2021-01-01 RX ORDER — WARFARIN SODIUM 2.5 MG/1
2.5 TABLET ORAL
Status: COMPLETED | OUTPATIENT
Start: 2021-01-01 | End: 2021-01-01

## 2021-01-01 RX ORDER — ASCORBIC ACID 500 MG
1000 TABLET ORAL DAILY
Status: DISCONTINUED | OUTPATIENT
Start: 2021-01-01 | End: 2021-01-01 | Stop reason: HOSPADM

## 2021-01-01 RX ORDER — FLUORIDE TOOTHPASTE
5 TOOTHPASTE DENTAL PRN
Status: DISCONTINUED | OUTPATIENT
Start: 2021-01-01 | End: 2021-01-01 | Stop reason: HOSPADM

## 2021-01-01 RX ORDER — ALLOPURINOL 300 MG/1
300 TABLET ORAL DAILY
Qty: 90 TABLET | Refills: 1 | Status: SHIPPED | OUTPATIENT
Start: 2021-01-01

## 2021-01-01 RX ORDER — INSULIN GLARGINE 100 [IU]/ML
10 INJECTION, SOLUTION SUBCUTANEOUS ONCE
Status: COMPLETED | OUTPATIENT
Start: 2021-01-01 | End: 2021-01-01

## 2021-01-01 RX ADMIN — Medication 50 MG: at 09:50

## 2021-01-01 RX ADMIN — WARFARIN SODIUM 2 MG: 2 TABLET ORAL at 19:31

## 2021-01-01 RX ADMIN — ATORVASTATIN CALCIUM 80 MG: 80 TABLET, FILM COATED ORAL at 20:33

## 2021-01-01 RX ADMIN — INSULIN LISPRO 8 UNITS: 100 INJECTION, SOLUTION INTRAVENOUS; SUBCUTANEOUS at 12:20

## 2021-01-01 RX ADMIN — MORPHINE SULFATE 4 MG: 4 INJECTION, SOLUTION INTRAMUSCULAR; INTRAVENOUS at 01:55

## 2021-01-01 RX ADMIN — ASPIRIN 81 MG: 81 TABLET, COATED ORAL at 10:13

## 2021-01-01 RX ADMIN — METOPROLOL TARTRATE 50 MG: 50 TABLET, FILM COATED ORAL at 19:22

## 2021-01-01 RX ADMIN — HYDROCHLOROTHIAZIDE 25 MG: 25 TABLET ORAL at 08:34

## 2021-01-01 RX ADMIN — INSULIN LISPRO 2 UNITS: 100 INJECTION, SOLUTION INTRAVENOUS; SUBCUTANEOUS at 08:36

## 2021-01-01 RX ADMIN — INSULIN LISPRO 4 UNITS: 100 INJECTION, SOLUTION INTRAVENOUS; SUBCUTANEOUS at 17:54

## 2021-01-01 RX ADMIN — Medication 1000 UNITS: at 09:46

## 2021-01-01 RX ADMIN — FUROSEMIDE 40 MG: 10 INJECTION, SOLUTION INTRAMUSCULAR; INTRAVENOUS at 08:39

## 2021-01-01 RX ADMIN — DEXAMETHASONE 6 MG: 4 TABLET ORAL at 09:46

## 2021-01-01 RX ADMIN — FAMOTIDINE 20 MG: 10 INJECTION, SOLUTION INTRAVENOUS at 21:12

## 2021-01-01 RX ADMIN — IPRATROPIUM BROMIDE AND ALBUTEROL SULFATE 1 AMPULE: .5; 3 SOLUTION RESPIRATORY (INHALATION) at 09:15

## 2021-01-01 RX ADMIN — ATORVASTATIN CALCIUM 80 MG: 80 TABLET, FILM COATED ORAL at 20:17

## 2021-01-01 RX ADMIN — OXYCODONE HYDROCHLORIDE AND ACETAMINOPHEN 1000 MG: 500 TABLET ORAL at 08:58

## 2021-01-01 RX ADMIN — INSULIN LISPRO 2 UNITS: 100 INJECTION, SOLUTION INTRAVENOUS; SUBCUTANEOUS at 17:21

## 2021-01-01 RX ADMIN — Medication 1000 UNITS: at 08:13

## 2021-01-01 RX ADMIN — FLUTICASONE PROPIONATE 1 SPRAY: 50 SPRAY, METERED NASAL at 08:57

## 2021-01-01 RX ADMIN — DEXAMETHASONE 6 MG: 4 TABLET ORAL at 09:50

## 2021-01-01 RX ADMIN — SODIUM CHLORIDE, PRESERVATIVE FREE 10 ML: 5 INJECTION INTRAVENOUS at 19:22

## 2021-01-01 RX ADMIN — Medication 1000 UNITS: at 08:34

## 2021-01-01 RX ADMIN — METOPROLOL TARTRATE 50 MG: 50 TABLET, FILM COATED ORAL at 20:23

## 2021-01-01 RX ADMIN — INSULIN LISPRO 4 UNITS: 100 INJECTION, SOLUTION INTRAVENOUS; SUBCUTANEOUS at 12:31

## 2021-01-01 RX ADMIN — GLYCOPYRROLATE AND FORMOTEROL FUMARATE 2 PUFF: 9; 4.8 AEROSOL, METERED RESPIRATORY (INHALATION) at 07:20

## 2021-01-01 RX ADMIN — Medication 6 MG: at 08:13

## 2021-01-01 RX ADMIN — Medication 3 MG: at 22:02

## 2021-01-01 RX ADMIN — INSULIN LISPRO 6 UNITS: 100 INJECTION, SOLUTION INTRAVENOUS; SUBCUTANEOUS at 12:29

## 2021-01-01 RX ADMIN — SODIUM CHLORIDE, PRESERVATIVE FREE 10 ML: 5 INJECTION INTRAVENOUS at 09:22

## 2021-01-01 RX ADMIN — INSULIN GLARGINE 40 UNITS: 100 INJECTION, SOLUTION SUBCUTANEOUS at 06:24

## 2021-01-01 RX ADMIN — FAMOTIDINE 20 MG: 20 TABLET ORAL at 20:33

## 2021-01-01 RX ADMIN — OXYCODONE HYDROCHLORIDE AND ACETAMINOPHEN 1000 MG: 500 TABLET ORAL at 09:50

## 2021-01-01 RX ADMIN — OXYCODONE HYDROCHLORIDE AND ACETAMINOPHEN 1000 MG: 500 TABLET ORAL at 08:47

## 2021-01-01 RX ADMIN — WARFARIN SODIUM 3 MG: 3 TABLET ORAL at 18:25

## 2021-01-01 RX ADMIN — GLYCOPYRROLATE AND FORMOTEROL FUMARATE 2 PUFF: 9; 4.8 AEROSOL, METERED RESPIRATORY (INHALATION) at 17:20

## 2021-01-01 RX ADMIN — DEXAMETHASONE 6 MG: 4 TABLET ORAL at 10:12

## 2021-01-01 RX ADMIN — ALLOPURINOL 300 MG: 300 TABLET ORAL at 08:13

## 2021-01-01 RX ADMIN — Medication 1000 UNITS: at 08:56

## 2021-01-01 RX ADMIN — FLUTICASONE PROPIONATE 1 SPRAY: 50 SPRAY, METERED NASAL at 08:13

## 2021-01-01 RX ADMIN — GLYCOPYRROLATE AND FORMOTEROL FUMARATE 2 PUFF: 9; 4.8 AEROSOL, METERED RESPIRATORY (INHALATION) at 18:24

## 2021-01-01 RX ADMIN — HEPARIN SODIUM 18 UNITS/KG/HR: 10000 INJECTION, SOLUTION INTRAVENOUS at 10:30

## 2021-01-01 RX ADMIN — METOPROLOL TARTRATE 50 MG: 50 TABLET, FILM COATED ORAL at 08:31

## 2021-01-01 RX ADMIN — HYDROCHLOROTHIAZIDE 25 MG: 25 TABLET ORAL at 08:51

## 2021-01-01 RX ADMIN — INSULIN LISPRO 2 UNITS: 100 INJECTION, SOLUTION INTRAVENOUS; SUBCUTANEOUS at 08:51

## 2021-01-01 RX ADMIN — FAMOTIDINE 20 MG: 20 TABLET ORAL at 20:38

## 2021-01-01 RX ADMIN — MIDAZOLAM 4 MG: 1 INJECTION INTRAMUSCULAR; INTRAVENOUS at 02:18

## 2021-01-01 RX ADMIN — IPRATROPIUM BROMIDE AND ALBUTEROL SULFATE 1 AMPULE: .5; 3 SOLUTION RESPIRATORY (INHALATION) at 21:44

## 2021-01-01 RX ADMIN — GLYCOPYRROLATE AND FORMOTEROL FUMARATE 2 PUFF: 9; 4.8 AEROSOL, METERED RESPIRATORY (INHALATION) at 20:07

## 2021-01-01 RX ADMIN — POLYETHYLENE GLYCOL 3350 17 G: 17 POWDER, FOR SOLUTION ORAL at 09:47

## 2021-01-01 RX ADMIN — SODIUM CHLORIDE, PRESERVATIVE FREE 10 ML: 5 INJECTION INTRAVENOUS at 09:30

## 2021-01-01 RX ADMIN — INSULIN LISPRO 2 UNITS: 100 INJECTION, SOLUTION INTRAVENOUS; SUBCUTANEOUS at 17:22

## 2021-01-01 RX ADMIN — INSULIN GLARGINE 10 UNITS: 100 INJECTION, SOLUTION SUBCUTANEOUS at 11:26

## 2021-01-01 RX ADMIN — HEPARIN SODIUM 18 UNITS/KG/HR: 10000 INJECTION, SOLUTION INTRAVENOUS at 17:10

## 2021-01-01 RX ADMIN — OXYCODONE HYDROCHLORIDE AND ACETAMINOPHEN 1000 MG: 500 TABLET ORAL at 08:10

## 2021-01-01 RX ADMIN — FUROSEMIDE 40 MG: 10 INJECTION, SOLUTION INTRAMUSCULAR; INTRAVENOUS at 12:25

## 2021-01-01 RX ADMIN — FUROSEMIDE 40 MG: 10 INJECTION, SOLUTION INTRAMUSCULAR; INTRAVENOUS at 08:11

## 2021-01-01 RX ADMIN — FLUTICASONE PROPIONATE 1 SPRAY: 50 SPRAY, METERED NASAL at 10:36

## 2021-01-01 RX ADMIN — INSULIN LISPRO 4 UNITS: 100 INJECTION, SOLUTION INTRAVENOUS; SUBCUTANEOUS at 08:36

## 2021-01-01 RX ADMIN — GLYCOPYRROLATE AND FORMOTEROL FUMARATE 2 PUFF: 9; 4.8 AEROSOL, METERED RESPIRATORY (INHALATION) at 07:30

## 2021-01-01 RX ADMIN — INSULIN LISPRO 6 UNITS: 100 INJECTION, SOLUTION INTRAVENOUS; SUBCUTANEOUS at 17:12

## 2021-01-01 RX ADMIN — INSULIN LISPRO 4 UNITS: 100 INJECTION, SOLUTION INTRAVENOUS; SUBCUTANEOUS at 12:43

## 2021-01-01 RX ADMIN — INSULIN LISPRO 6 UNITS: 100 INJECTION, SOLUTION INTRAVENOUS; SUBCUTANEOUS at 16:44

## 2021-01-01 RX ADMIN — Medication 50 MG: at 09:48

## 2021-01-01 RX ADMIN — DEXAMETHASONE SODIUM PHOSPHATE 6 MG: 4 INJECTION, SOLUTION INTRAMUSCULAR; INTRAVENOUS at 02:44

## 2021-01-01 RX ADMIN — INSULIN GLARGINE 40 UNITS: 100 INJECTION, SOLUTION SUBCUTANEOUS at 06:49

## 2021-01-01 RX ADMIN — GLYCOPYRROLATE AND FORMOTEROL FUMARATE 2 PUFF: 9; 4.8 AEROSOL, METERED RESPIRATORY (INHALATION) at 07:49

## 2021-01-01 RX ADMIN — WARFARIN SODIUM 2.5 MG: 2.5 TABLET ORAL at 17:37

## 2021-01-01 RX ADMIN — MIDAZOLAM HYDROCHLORIDE 4 MG: 1 INJECTION INTRAMUSCULAR; INTRAVENOUS at 02:18

## 2021-01-01 RX ADMIN — METOPROLOL TARTRATE 50 MG: 50 TABLET, FILM COATED ORAL at 08:47

## 2021-01-01 RX ADMIN — Medication 3 MG: at 21:11

## 2021-01-01 RX ADMIN — PIPERACILLIN AND TAZOBACTAM 3375 MG: 3; .375 INJECTION, POWDER, LYOPHILIZED, FOR SOLUTION INTRAVENOUS at 18:40

## 2021-01-01 RX ADMIN — INSULIN LISPRO 8 UNITS: 100 INJECTION, SOLUTION INTRAVENOUS; SUBCUTANEOUS at 09:41

## 2021-01-01 RX ADMIN — INSULIN LISPRO 2 UNITS: 100 INJECTION, SOLUTION INTRAVENOUS; SUBCUTANEOUS at 11:49

## 2021-01-01 RX ADMIN — METOPROLOL TARTRATE 50 MG: 50 TABLET, FILM COATED ORAL at 08:13

## 2021-01-01 RX ADMIN — IPRATROPIUM BROMIDE AND ALBUTEROL SULFATE 1 AMPULE: .5; 3 SOLUTION RESPIRATORY (INHALATION) at 12:55

## 2021-01-01 RX ADMIN — FAMOTIDINE 20 MG: 10 INJECTION, SOLUTION INTRAVENOUS at 21:52

## 2021-01-01 RX ADMIN — INSULIN GLARGINE 10 UNITS: 100 INJECTION, SOLUTION SUBCUTANEOUS at 10:05

## 2021-01-01 RX ADMIN — METOPROLOL TARTRATE 50 MG: 50 TABLET, FILM COATED ORAL at 10:12

## 2021-01-01 RX ADMIN — FAMOTIDINE 20 MG: 20 TABLET ORAL at 21:08

## 2021-01-01 RX ADMIN — IPRATROPIUM BROMIDE AND ALBUTEROL SULFATE 1 AMPULE: .5; 3 SOLUTION RESPIRATORY (INHALATION) at 16:30

## 2021-01-01 RX ADMIN — ALLOPURINOL 300 MG: 300 TABLET ORAL at 09:50

## 2021-01-01 RX ADMIN — Medication 3 MG: at 20:25

## 2021-01-01 RX ADMIN — Medication 1000 UNITS: at 08:11

## 2021-01-01 RX ADMIN — INSULIN LISPRO 4 UNITS: 100 INJECTION, SOLUTION INTRAVENOUS; SUBCUTANEOUS at 08:47

## 2021-01-01 RX ADMIN — FLUTICASONE PROPIONATE 1 SPRAY: 50 SPRAY, METERED NASAL at 08:32

## 2021-01-01 RX ADMIN — IPRATROPIUM BROMIDE AND ALBUTEROL SULFATE 1 AMPULE: .5; 3 SOLUTION RESPIRATORY (INHALATION) at 16:27

## 2021-01-01 RX ADMIN — MORPHINE SULFATE 6 MG: 4 INJECTION, SOLUTION INTRAMUSCULAR; INTRAVENOUS at 00:39

## 2021-01-01 RX ADMIN — INSULIN LISPRO 10 UNITS: 100 INJECTION, SOLUTION INTRAVENOUS; SUBCUTANEOUS at 17:32

## 2021-01-01 RX ADMIN — INSULIN GLARGINE 35 UNITS: 100 INJECTION, SOLUTION SUBCUTANEOUS at 08:31

## 2021-01-01 RX ADMIN — WARFARIN SODIUM 3 MG: 3 TABLET ORAL at 17:21

## 2021-01-01 RX ADMIN — SODIUM CHLORIDE 500 ML: 9 INJECTION, SOLUTION INTRAVENOUS at 20:45

## 2021-01-01 RX ADMIN — GLYCOPYRROLATE AND FORMOTEROL FUMARATE 2 PUFF: 9; 4.8 AEROSOL, METERED RESPIRATORY (INHALATION) at 09:15

## 2021-01-01 RX ADMIN — FAMOTIDINE 20 MG: 20 TABLET ORAL at 08:49

## 2021-01-01 RX ADMIN — GLYCOPYRROLATE AND FORMOTEROL FUMARATE 2 PUFF: 9; 4.8 AEROSOL, METERED RESPIRATORY (INHALATION) at 17:43

## 2021-01-01 RX ADMIN — ALLOPURINOL 300 MG: 300 TABLET ORAL at 10:12

## 2021-01-01 RX ADMIN — ASPIRIN 81 MG: 81 TABLET, COATED ORAL at 08:47

## 2021-01-01 RX ADMIN — FAMOTIDINE 20 MG: 20 TABLET ORAL at 21:34

## 2021-01-01 RX ADMIN — INSULIN LISPRO 4 UNITS: 100 INJECTION, SOLUTION INTRAVENOUS; SUBCUTANEOUS at 08:31

## 2021-01-01 RX ADMIN — FLUTICASONE PROPIONATE 1 SPRAY: 50 SPRAY, METERED NASAL at 10:11

## 2021-01-01 RX ADMIN — GLYCOPYRROLATE AND FORMOTEROL FUMARATE 2 PUFF: 9; 4.8 AEROSOL, METERED RESPIRATORY (INHALATION) at 08:50

## 2021-01-01 RX ADMIN — FUROSEMIDE 40 MG: 10 INJECTION, SOLUTION INTRAMUSCULAR; INTRAVENOUS at 08:13

## 2021-01-01 RX ADMIN — Medication 50 MG: at 08:57

## 2021-01-01 RX ADMIN — MORPHINE SULFATE 2 MG: 2 INJECTION, SOLUTION INTRAMUSCULAR; INTRAVENOUS at 12:41

## 2021-01-01 RX ADMIN — FLUTICASONE PROPIONATE 1 SPRAY: 50 SPRAY, METERED NASAL at 09:50

## 2021-01-01 RX ADMIN — IPRATROPIUM BROMIDE AND ALBUTEROL SULFATE 1 AMPULE: .5; 3 SOLUTION RESPIRATORY (INHALATION) at 00:40

## 2021-01-01 RX ADMIN — FAMOTIDINE 20 MG: 20 TABLET ORAL at 20:17

## 2021-01-01 RX ADMIN — WARFARIN SODIUM 6 MG: 3 TABLET ORAL at 17:11

## 2021-01-01 RX ADMIN — OXYCODONE HYDROCHLORIDE AND ACETAMINOPHEN 1000 MG: 500 TABLET ORAL at 08:57

## 2021-01-01 RX ADMIN — OXYCODONE HYDROCHLORIDE AND ACETAMINOPHEN 1000 MG: 500 TABLET ORAL at 08:13

## 2021-01-01 RX ADMIN — FAMOTIDINE 20 MG: 10 INJECTION, SOLUTION INTRAVENOUS at 08:34

## 2021-01-01 RX ADMIN — ATORVASTATIN CALCIUM 80 MG: 80 TABLET, FILM COATED ORAL at 21:52

## 2021-01-01 RX ADMIN — DEXAMETHASONE 6 MG: 4 TABLET ORAL at 08:31

## 2021-01-01 RX ADMIN — SODIUM CHLORIDE, PRESERVATIVE FREE 10 ML: 5 INJECTION INTRAVENOUS at 21:53

## 2021-01-01 RX ADMIN — FAMOTIDINE 20 MG: 20 TABLET ORAL at 09:50

## 2021-01-01 RX ADMIN — WARFARIN SODIUM 2 MG: 2 TABLET ORAL at 18:33

## 2021-01-01 RX ADMIN — FAMOTIDINE 20 MG: 20 TABLET ORAL at 21:03

## 2021-01-01 RX ADMIN — Medication 50 MG: at 10:11

## 2021-01-01 RX ADMIN — INSULIN LISPRO 6 UNITS: 100 INJECTION, SOLUTION INTRAVENOUS; SUBCUTANEOUS at 11:46

## 2021-01-01 RX ADMIN — ATORVASTATIN CALCIUM 80 MG: 80 TABLET, FILM COATED ORAL at 21:34

## 2021-01-01 RX ADMIN — FUROSEMIDE 40 MG: 10 INJECTION, SOLUTION INTRAMUSCULAR; INTRAVENOUS at 09:46

## 2021-01-01 RX ADMIN — Medication 1000 UNITS: at 10:12

## 2021-01-01 RX ADMIN — SODIUM CHLORIDE, PRESERVATIVE FREE 10 ML: 5 INJECTION INTRAVENOUS at 20:33

## 2021-01-01 RX ADMIN — Medication 3 MG: at 20:17

## 2021-01-01 RX ADMIN — Medication 3 MG: at 21:06

## 2021-01-01 RX ADMIN — FLUTICASONE PROPIONATE 1 SPRAY: 50 SPRAY, METERED NASAL at 08:58

## 2021-01-01 RX ADMIN — GLYCOPYRROLATE AND FORMOTEROL FUMARATE 2 PUFF: 9; 4.8 AEROSOL, METERED RESPIRATORY (INHALATION) at 20:52

## 2021-01-01 RX ADMIN — METOPROLOL TARTRATE 50 MG: 50 TABLET, FILM COATED ORAL at 21:34

## 2021-01-01 RX ADMIN — LORAZEPAM 2 MG: 2 INJECTION INTRAMUSCULAR; INTRAVENOUS at 00:39

## 2021-01-01 RX ADMIN — METOPROLOL TARTRATE 50 MG: 50 TABLET, FILM COATED ORAL at 20:18

## 2021-01-01 RX ADMIN — Medication 50 MG: at 08:30

## 2021-01-01 RX ADMIN — FUROSEMIDE 40 MG: 10 INJECTION, SOLUTION INTRAMUSCULAR; INTRAVENOUS at 10:21

## 2021-01-01 RX ADMIN — INSULIN GLARGINE 40 UNITS: 100 INJECTION, SOLUTION SUBCUTANEOUS at 09:46

## 2021-01-01 RX ADMIN — FLUTICASONE PROPIONATE 1 SPRAY: 50 SPRAY, METERED NASAL at 08:46

## 2021-01-01 RX ADMIN — HEPARIN SODIUM 3660 UNITS: 1000 INJECTION INTRAVENOUS; SUBCUTANEOUS at 16:02

## 2021-01-01 RX ADMIN — Medication 1000 UNITS: at 08:54

## 2021-01-01 RX ADMIN — GLYCOPYRROLATE AND FORMOTEROL FUMARATE 2 PUFF: 9; 4.8 AEROSOL, METERED RESPIRATORY (INHALATION) at 07:15

## 2021-01-01 RX ADMIN — FUROSEMIDE 40 MG: 10 INJECTION, SOLUTION INTRAMUSCULAR; INTRAVENOUS at 08:51

## 2021-01-01 RX ADMIN — DEXAMETHASONE 6 MG: 4 TABLET ORAL at 08:56

## 2021-01-01 RX ADMIN — ASPIRIN 81 MG: 81 TABLET, COATED ORAL at 08:13

## 2021-01-01 RX ADMIN — FAMOTIDINE 20 MG: 10 INJECTION, SOLUTION INTRAVENOUS at 21:54

## 2021-01-01 RX ADMIN — ALLOPURINOL 300 MG: 300 TABLET ORAL at 09:46

## 2021-01-01 RX ADMIN — INSULIN LISPRO 2 UNITS: 100 INJECTION, SOLUTION INTRAVENOUS; SUBCUTANEOUS at 09:30

## 2021-01-01 RX ADMIN — GLYCOPYRROLATE AND FORMOTEROL FUMARATE 2 PUFF: 9; 4.8 AEROSOL, METERED RESPIRATORY (INHALATION) at 18:04

## 2021-01-01 RX ADMIN — GLYCOPYRROLATE AND FORMOTEROL FUMARATE 2 PUFF: 9; 4.8 AEROSOL, METERED RESPIRATORY (INHALATION) at 09:12

## 2021-01-01 RX ADMIN — POTASSIUM CHLORIDE 40 MEQ: 1500 TABLET, EXTENDED RELEASE ORAL at 22:05

## 2021-01-01 RX ADMIN — FLUTICASONE PROPIONATE 1 SPRAY: 50 SPRAY, METERED NASAL at 08:14

## 2021-01-01 RX ADMIN — ASPIRIN 81 MG: 81 TABLET, COATED ORAL at 08:56

## 2021-01-01 RX ADMIN — WARFARIN SODIUM 1 MG: 1 TABLET ORAL at 18:07

## 2021-01-01 RX ADMIN — INSULIN LISPRO 4 UNITS: 100 INJECTION, SOLUTION INTRAVENOUS; SUBCUTANEOUS at 17:00

## 2021-01-01 RX ADMIN — DEXAMETHASONE 6 MG: 4 TABLET ORAL at 08:47

## 2021-01-01 RX ADMIN — SODIUM CHLORIDE, PRESERVATIVE FREE 10 ML: 5 INJECTION INTRAVENOUS at 10:25

## 2021-01-01 RX ADMIN — METOPROLOL TARTRATE 50 MG: 50 TABLET, FILM COATED ORAL at 08:57

## 2021-01-01 RX ADMIN — FAMOTIDINE 20 MG: 10 INJECTION, SOLUTION INTRAVENOUS at 08:57

## 2021-01-01 RX ADMIN — Medication 6 MG: at 08:31

## 2021-01-01 RX ADMIN — SODIUM CHLORIDE, PRESERVATIVE FREE 10 ML: 5 INJECTION INTRAVENOUS at 10:38

## 2021-01-01 RX ADMIN — FUROSEMIDE 40 MG: 10 INJECTION, SOLUTION INTRAMUSCULAR; INTRAVENOUS at 09:50

## 2021-01-01 RX ADMIN — OXYCODONE HYDROCHLORIDE AND ACETAMINOPHEN 1000 MG: 500 TABLET ORAL at 08:49

## 2021-01-01 RX ADMIN — GLYCOPYRROLATE AND FORMOTEROL FUMARATE 2 PUFF: 9; 4.8 AEROSOL, METERED RESPIRATORY (INHALATION) at 17:36

## 2021-01-01 RX ADMIN — Medication 50 MG: at 08:11

## 2021-01-01 RX ADMIN — METOPROLOL TARTRATE 50 MG: 50 TABLET, FILM COATED ORAL at 21:11

## 2021-01-01 RX ADMIN — METOPROLOL TARTRATE 50 MG: 50 TABLET, FILM COATED ORAL at 08:34

## 2021-01-01 RX ADMIN — INSULIN LISPRO 2 UNITS: 100 INJECTION, SOLUTION INTRAVENOUS; SUBCUTANEOUS at 12:37

## 2021-01-01 RX ADMIN — SODIUM CHLORIDE, PRESERVATIVE FREE 10 ML: 5 INJECTION INTRAVENOUS at 08:13

## 2021-01-01 RX ADMIN — IPRATROPIUM BROMIDE AND ALBUTEROL SULFATE 1 AMPULE: .5; 3 SOLUTION RESPIRATORY (INHALATION) at 20:52

## 2021-01-01 RX ADMIN — DEXAMETHASONE 6 MG: 4 TABLET ORAL at 08:11

## 2021-01-01 RX ADMIN — Medication 50 MG: at 08:13

## 2021-01-01 RX ADMIN — OXYCODONE HYDROCHLORIDE AND ACETAMINOPHEN 1000 MG: 500 TABLET ORAL at 08:34

## 2021-01-01 RX ADMIN — IPRATROPIUM BROMIDE AND ALBUTEROL SULFATE 1 AMPULE: .5; 3 SOLUTION RESPIRATORY (INHALATION) at 00:11

## 2021-01-01 RX ADMIN — HEPARIN SODIUM 14.74 UNITS/KG/HR: 10000 INJECTION, SOLUTION INTRAVENOUS at 05:38

## 2021-01-01 RX ADMIN — Medication 1000 UNITS: at 08:47

## 2021-01-01 RX ADMIN — DEXAMETHASONE 6 MG: 4 TABLET ORAL at 08:34

## 2021-01-01 RX ADMIN — FAMOTIDINE 20 MG: 20 TABLET ORAL at 08:13

## 2021-01-01 RX ADMIN — PIPERACILLIN AND TAZOBACTAM 3375 MG: 3; .375 INJECTION, POWDER, LYOPHILIZED, FOR SOLUTION INTRAVENOUS at 10:38

## 2021-01-01 RX ADMIN — WARFARIN SODIUM 2 MG: 2 TABLET ORAL at 18:10

## 2021-01-01 RX ADMIN — FAMOTIDINE 20 MG: 10 INJECTION, SOLUTION INTRAVENOUS at 08:11

## 2021-01-01 RX ADMIN — SODIUM CHLORIDE, PRESERVATIVE FREE 10 ML: 5 INJECTION INTRAVENOUS at 20:18

## 2021-01-01 RX ADMIN — TOCILIZUMAB 800 MG: 20 INJECTION, SOLUTION, CONCENTRATE INTRAVENOUS at 14:28

## 2021-01-01 RX ADMIN — FAMOTIDINE 20 MG: 20 TABLET ORAL at 21:06

## 2021-01-01 RX ADMIN — ATORVASTATIN CALCIUM 80 MG: 80 TABLET, FILM COATED ORAL at 21:08

## 2021-01-01 RX ADMIN — FUROSEMIDE 40 MG: 10 INJECTION, SOLUTION INTRAMUSCULAR; INTRAVENOUS at 08:55

## 2021-01-01 RX ADMIN — SODIUM CHLORIDE, PRESERVATIVE FREE 10 ML: 5 INJECTION INTRAVENOUS at 09:52

## 2021-01-01 RX ADMIN — SODIUM CHLORIDE, PRESERVATIVE FREE 10 ML: 5 INJECTION INTRAVENOUS at 20:39

## 2021-01-01 RX ADMIN — SODIUM CHLORIDE, PRESERVATIVE FREE 10 ML: 5 INJECTION INTRAVENOUS at 21:06

## 2021-01-01 RX ADMIN — POLYETHYLENE GLYCOL 3350 17 G: 17 POWDER, FOR SOLUTION ORAL at 11:51

## 2021-01-01 RX ADMIN — METOPROLOL TARTRATE 50 MG: 50 TABLET, FILM COATED ORAL at 08:48

## 2021-01-01 RX ADMIN — FAMOTIDINE 20 MG: 20 TABLET ORAL at 10:13

## 2021-01-01 RX ADMIN — LORAZEPAM 2 MG: 2 INJECTION INTRAMUSCULAR; INTRAVENOUS at 01:04

## 2021-01-01 RX ADMIN — SODIUM CHLORIDE, PRESERVATIVE FREE 10 ML: 5 INJECTION INTRAVENOUS at 20:17

## 2021-01-01 RX ADMIN — ASPIRIN 81 MG: 81 TABLET, COATED ORAL at 08:36

## 2021-01-01 RX ADMIN — DEXAMETHASONE 6 MG: 4 TABLET ORAL at 08:48

## 2021-01-01 RX ADMIN — METOPROLOL TARTRATE 50 MG: 50 TABLET, FILM COATED ORAL at 09:51

## 2021-01-01 RX ADMIN — POTASSIUM CHLORIDE 40 MEQ: 1500 TABLET, EXTENDED RELEASE ORAL at 13:37

## 2021-01-01 RX ADMIN — ASPIRIN 81 MG: 81 TABLET, COATED ORAL at 08:49

## 2021-01-01 RX ADMIN — INSULIN LISPRO 2 UNITS: 100 INJECTION, SOLUTION INTRAVENOUS; SUBCUTANEOUS at 16:35

## 2021-01-01 RX ADMIN — ALLOPURINOL 300 MG: 300 TABLET ORAL at 08:49

## 2021-01-01 RX ADMIN — Medication 50 MG: at 08:48

## 2021-01-01 RX ADMIN — INSULIN GLARGINE 40 UNITS: 100 INJECTION, SOLUTION SUBCUTANEOUS at 08:38

## 2021-01-01 RX ADMIN — DEXAMETHASONE 6 MG: 4 TABLET ORAL at 08:13

## 2021-01-01 RX ADMIN — PHYTONADIONE 2 MG: 10 INJECTION, EMULSION INTRAMUSCULAR; INTRAVENOUS; SUBCUTANEOUS at 02:47

## 2021-01-01 RX ADMIN — IPRATROPIUM BROMIDE AND ALBUTEROL SULFATE 1 AMPULE: .5; 3 SOLUTION RESPIRATORY (INHALATION) at 04:31

## 2021-01-01 RX ADMIN — INSULIN GLARGINE 40 UNITS: 100 INJECTION, SOLUTION SUBCUTANEOUS at 10:39

## 2021-01-01 RX ADMIN — SODIUM CHLORIDE, PRESERVATIVE FREE 10 ML: 5 INJECTION INTRAVENOUS at 08:39

## 2021-01-01 RX ADMIN — SODIUM CHLORIDE, PRESERVATIVE FREE 10 ML: 5 INJECTION INTRAVENOUS at 21:38

## 2021-01-01 RX ADMIN — ALLOPURINOL 300 MG: 300 TABLET ORAL at 08:57

## 2021-01-01 RX ADMIN — INSULIN LISPRO 2 UNITS: 100 INJECTION, SOLUTION INTRAVENOUS; SUBCUTANEOUS at 13:10

## 2021-01-01 RX ADMIN — IPRATROPIUM BROMIDE AND ALBUTEROL SULFATE 1 AMPULE: .5; 3 SOLUTION RESPIRATORY (INHALATION) at 12:58

## 2021-01-01 RX ADMIN — ALLOPURINOL 300 MG: 300 TABLET ORAL at 08:52

## 2021-01-01 RX ADMIN — ATORVASTATIN CALCIUM 80 MG: 80 TABLET, FILM COATED ORAL at 21:11

## 2021-01-01 RX ADMIN — WARFARIN SODIUM 5 MG: 5 TABLET ORAL at 17:27

## 2021-01-01 RX ADMIN — BISACODYL 10 MG: 10 SUPPOSITORY RECTAL at 18:10

## 2021-01-01 RX ADMIN — IPRATROPIUM BROMIDE AND ALBUTEROL SULFATE 1 AMPULE: .5; 3 SOLUTION RESPIRATORY (INHALATION) at 14:38

## 2021-01-01 RX ADMIN — GLYCOPYRROLATE AND FORMOTEROL FUMARATE 2 PUFF: 9; 4.8 AEROSOL, METERED RESPIRATORY (INHALATION) at 09:00

## 2021-01-01 RX ADMIN — ASPIRIN 81 MG: 81 TABLET, COATED ORAL at 08:34

## 2021-01-01 RX ADMIN — FAMOTIDINE 20 MG: 20 TABLET ORAL at 09:46

## 2021-01-01 RX ADMIN — ATORVASTATIN CALCIUM 80 MG: 80 TABLET, FILM COATED ORAL at 19:21

## 2021-01-01 RX ADMIN — Medication 50 MG: at 08:47

## 2021-01-01 RX ADMIN — SODIUM CHLORIDE: 9 INJECTION, SOLUTION INTRAVENOUS at 20:54

## 2021-01-01 RX ADMIN — INSULIN LISPRO 2 UNITS: 100 INJECTION, SOLUTION INTRAVENOUS; SUBCUTANEOUS at 11:57

## 2021-01-01 RX ADMIN — SODIUM CHLORIDE, PRESERVATIVE FREE 10 ML: 5 INJECTION INTRAVENOUS at 08:14

## 2021-01-01 RX ADMIN — OXYCODONE HYDROCHLORIDE AND ACETAMINOPHEN 1000 MG: 500 TABLET ORAL at 09:46

## 2021-01-01 RX ADMIN — Medication 1000 UNITS: at 08:31

## 2021-01-01 RX ADMIN — HEPARIN SODIUM 16 UNITS/KG/HR: 10000 INJECTION, SOLUTION INTRAVENOUS at 08:46

## 2021-01-01 RX ADMIN — Medication 50 MG: at 08:34

## 2021-01-01 RX ADMIN — ALLOPURINOL 300 MG: 300 TABLET ORAL at 08:31

## 2021-01-01 RX ADMIN — INSULIN LISPRO 2 UNITS: 100 INJECTION, SOLUTION INTRAVENOUS; SUBCUTANEOUS at 12:27

## 2021-01-01 RX ADMIN — ATORVASTATIN CALCIUM 80 MG: 80 TABLET, FILM COATED ORAL at 20:23

## 2021-01-01 RX ADMIN — IPRATROPIUM BROMIDE AND ALBUTEROL SULFATE 1 AMPULE: .5; 3 SOLUTION RESPIRATORY (INHALATION) at 16:14

## 2021-01-01 RX ADMIN — OXYCODONE HYDROCHLORIDE AND ACETAMINOPHEN 1000 MG: 500 TABLET ORAL at 10:13

## 2021-01-01 RX ADMIN — INSULIN LISPRO 6 UNITS: 100 INJECTION, SOLUTION INTRAVENOUS; SUBCUTANEOUS at 17:20

## 2021-01-01 RX ADMIN — ALLOPURINOL 300 MG: 300 TABLET ORAL at 08:34

## 2021-01-01 RX ADMIN — Medication 6 MG: at 17:21

## 2021-01-01 RX ADMIN — ASPIRIN 81 MG: 81 TABLET, COATED ORAL at 08:11

## 2021-01-01 RX ADMIN — IPRATROPIUM BROMIDE AND ALBUTEROL SULFATE 1 AMPULE: .5; 3 SOLUTION RESPIRATORY (INHALATION) at 20:06

## 2021-01-01 RX ADMIN — INSULIN GLARGINE 40 UNITS: 100 INJECTION, SOLUTION SUBCUTANEOUS at 09:49

## 2021-01-01 RX ADMIN — MORPHINE SULFATE 2 MG: 2 INJECTION, SOLUTION INTRAMUSCULAR; INTRAVENOUS at 00:49

## 2021-01-01 RX ADMIN — ASPIRIN 81 MG: 81 TABLET, COATED ORAL at 09:46

## 2021-01-01 RX ADMIN — Medication 1000 UNITS: at 08:58

## 2021-01-01 RX ADMIN — ASPIRIN 81 MG: 81 TABLET, COATED ORAL at 08:39

## 2021-01-01 RX ADMIN — ATORVASTATIN CALCIUM 80 MG: 80 TABLET, FILM COATED ORAL at 21:06

## 2021-01-01 RX ADMIN — FLUTICASONE PROPIONATE 1 SPRAY: 50 SPRAY, METERED NASAL at 08:50

## 2021-01-01 RX ADMIN — Medication 1000 UNITS: at 09:51

## 2021-01-01 RX ADMIN — ASPIRIN 81 MG: 81 TABLET, COATED ORAL at 08:52

## 2021-01-01 RX ADMIN — GLYCOPYRROLATE AND FORMOTEROL FUMARATE 2 PUFF: 9; 4.8 AEROSOL, METERED RESPIRATORY (INHALATION) at 16:01

## 2021-01-01 RX ADMIN — FAMOTIDINE 20 MG: 20 TABLET ORAL at 08:39

## 2021-01-01 RX ADMIN — INSULIN GLARGINE 40 UNITS: 100 INJECTION, SOLUTION SUBCUTANEOUS at 10:22

## 2021-01-01 RX ADMIN — SODIUM CHLORIDE, PRESERVATIVE FREE 10 ML: 5 INJECTION INTRAVENOUS at 08:54

## 2021-01-01 RX ADMIN — FUROSEMIDE 40 MG: 10 INJECTION, SOLUTION INTRAMUSCULAR; INTRAVENOUS at 09:04

## 2021-01-01 RX ADMIN — SODIUM CHLORIDE 250 ML: 9 INJECTION, SOLUTION INTRAVENOUS at 19:54

## 2021-01-01 RX ADMIN — GLYCOPYRROLATE AND FORMOTEROL FUMARATE 2 PUFF: 9; 4.8 AEROSOL, METERED RESPIRATORY (INHALATION) at 20:01

## 2021-01-01 RX ADMIN — FAMOTIDINE 20 MG: 20 TABLET ORAL at 08:46

## 2021-01-01 RX ADMIN — IPRATROPIUM BROMIDE AND ALBUTEROL SULFATE 1 AMPULE: .5; 3 SOLUTION RESPIRATORY (INHALATION) at 21:03

## 2021-01-01 RX ADMIN — SENNOSIDES 17.2 MG: 8.6 TABLET, FILM COATED ORAL at 17:11

## 2021-01-01 RX ADMIN — OXYCODONE HYDROCHLORIDE AND ACETAMINOPHEN 1000 MG: 500 TABLET ORAL at 08:31

## 2021-01-01 RX ADMIN — ALLOPURINOL 300 MG: 300 TABLET ORAL at 08:46

## 2021-01-01 RX ADMIN — Medication 6 MG: at 08:35

## 2021-01-01 RX ADMIN — INSULIN GLARGINE 10 UNITS: 100 INJECTION, SOLUTION SUBCUTANEOUS at 11:27

## 2021-01-01 RX ADMIN — HEPARIN SODIUM 14.74 UNITS/KG/HR: 10000 INJECTION, SOLUTION INTRAVENOUS at 21:09

## 2021-01-01 RX ADMIN — SODIUM CHLORIDE, PRESERVATIVE FREE 10 ML: 5 INJECTION INTRAVENOUS at 21:54

## 2021-01-01 RX ADMIN — GLYCOPYRROLATE AND FORMOTEROL FUMARATE 2 PUFF: 9; 4.8 AEROSOL, METERED RESPIRATORY (INHALATION) at 18:45

## 2021-01-01 RX ADMIN — FAMOTIDINE 20 MG: 10 INJECTION, SOLUTION INTRAVENOUS at 09:41

## 2021-01-01 RX ADMIN — GLYCOPYRROLATE AND FORMOTEROL FUMARATE 2 PUFF: 9; 4.8 AEROSOL, METERED RESPIRATORY (INHALATION) at 08:22

## 2021-01-01 RX ADMIN — SODIUM CHLORIDE, PRESERVATIVE FREE 10 ML: 5 INJECTION INTRAVENOUS at 08:52

## 2021-01-01 RX ADMIN — FUROSEMIDE 40 MG: 10 INJECTION, SOLUTION INTRAMUSCULAR; INTRAVENOUS at 08:57

## 2021-01-01 RX ADMIN — ATORVASTATIN CALCIUM 80 MG: 80 TABLET, FILM COATED ORAL at 20:39

## 2021-01-01 RX ADMIN — GLYCOPYRROLATE AND FORMOTEROL FUMARATE 2 PUFF: 9; 4.8 AEROSOL, METERED RESPIRATORY (INHALATION) at 08:47

## 2021-01-01 RX ADMIN — SODIUM CHLORIDE, PRESERVATIVE FREE 10 ML: 5 INJECTION INTRAVENOUS at 08:48

## 2021-01-01 RX ADMIN — SODIUM CHLORIDE, PRESERVATIVE FREE 10 ML: 5 INJECTION INTRAVENOUS at 02:45

## 2021-01-01 RX ADMIN — GLYCOPYRROLATE AND FORMOTEROL FUMARATE 2 PUFF: 9; 4.8 AEROSOL, METERED RESPIRATORY (INHALATION) at 20:42

## 2021-01-01 ASSESSMENT — PAIN SCALES - GENERAL
PAINLEVEL_OUTOF10: 0

## 2021-01-01 ASSESSMENT — LIFESTYLE VARIABLES: HOW OFTEN DO YOU HAVE A DRINK CONTAINING ALCOHOL: 0

## 2021-01-01 ASSESSMENT — PULMONARY FUNCTION TESTS
FEV1_PERCENT_PREDICTED_PRE: 52
FEV1/FVC: 82

## 2021-01-01 ASSESSMENT — ENCOUNTER SYMPTOMS
WHEEZING: 1
COLOR CHANGE: 0
NAUSEA: 0
DIARRHEA: 0
SINUS PAIN: 0
BACK PAIN: 0
DIARRHEA: 0
EYES NEGATIVE: 1
SHORTNESS OF BREATH: 1
CHEST TIGHTNESS: 0
VOMITING: 0
VOMITING: 0
COUGH: 0
BLOOD IN STOOL: 0
BACK PAIN: 0
VOMITING: 0
COUGH: 0
SORE THROAT: 0
EYE DISCHARGE: 0
SINUS PRESSURE: 0
SHORTNESS OF BREATH: 1
ABDOMINAL PAIN: 0
ABDOMINAL PAIN: 0
SHORTNESS OF BREATH: 1
EYE REDNESS: 0
EYE REDNESS: 0
ABDOMINAL PAIN: 0
TROUBLE SWALLOWING: 0
VOMITING: 0
NAUSEA: 0
VOICE CHANGE: 0
WHEEZING: 0
CHEST TIGHTNESS: 0
NAUSEA: 0
COUGH: 1
DIARRHEA: 0
RHINORRHEA: 0
COUGH: 0
SHORTNESS OF BREATH: 1
SORE THROAT: 0
RHINORRHEA: 0
WHEEZING: 1
CHEST TIGHTNESS: 0
CONSTIPATION: 0
DIARRHEA: 0
RHINORRHEA: 0
NAUSEA: 0

## 2021-01-01 ASSESSMENT — PAIN SCALES - WONG BAKER
WONGBAKER_NUMERICALRESPONSE: 0

## 2021-01-01 ASSESSMENT — PATIENT HEALTH QUESTIONNAIRE - PHQ9
SUM OF ALL RESPONSES TO PHQ QUESTIONS 1-9: 0
SUM OF ALL RESPONSES TO PHQ9 QUESTIONS 1 & 2: 0
SUM OF ALL RESPONSES TO PHQ QUESTIONS 1-9: 0

## 2021-01-24 PROBLEM — J44.9 COPD, GROUP A, BY GOLD 2017 CLASSIFICATION (HCC): Status: ACTIVE | Noted: 2021-01-01

## 2021-01-24 PROBLEM — I49.2 JUNCTIONAL PREMATURE DEPOLARIZATION (HCC): Status: RESOLVED | Noted: 2020-01-01 | Resolved: 2021-01-01

## 2021-01-24 PROBLEM — I50.22 CHRONIC SYSTOLIC CONGESTIVE HEART FAILURE (HCC): Status: ACTIVE | Noted: 2021-01-01

## 2021-01-25 NOTE — PROGRESS NOTES
[address] [place] [address] [address], [address]  Phone:  [phone]  NTY:[phone]       Medicare Annual Wellness Visit    Name: Jasmine Ordoñez Date: 2021   MRN: [id number] Sex: Male   Age: 77 y.o. Ethnicity: Non-/Non    : 1945 Race: Nayla Urbina is here for Medicare AWV (having times of hands and feet being cold all the time  ) and 6 Month Follow-Up    Screenings for behavioral, psychosocial and functional/safety risks, and cognitive dysfunction are all negative except as indicated below. These results, as well as other patient data from the [address] form, are documented in Flowsheets linked to this Encounter. Allergies   Allergen Reactions    Erythromycin Hives    Sulfa Antibiotics Other (See Comments)     PT DOESN'T REMEMBER    Vasotec [Enalapril] Other (See Comments)     cough       Prior to Visit Medications    Medication Sig Taking?  Authorizing Provider   atorvastatin (LIPITOR) 80 MG tablet TAKE 1 TABLET BY MOUTH ONE TIME A DAY  Yes Tashia Brown MD   warfarin (COUMADIN) 2 MG tablet AS DIRECTED PER DR EATON Yes Tashia Brown MD   warfarin (COUMADIN) 5 MG tablet TAKE AS DIRECTED PER  DR EATON Yes Tashia Brown MD   nitroGLYCERIN (NITROSTAT) 0.4 MG SL tablet Place 1 tablet under the tongue every 5 minutes as needed for Chest pain Yes Bradford Cedillo MD   losartan (COZAAR) 25 MG tablet Take 1 tablet by mouth daily Yes Tashia Brown MD   metoprolol tartrate (LOPRESSOR) 50 MG tablet Take 1 tablet by mouth 2 times daily Yes Tashia Brown MD   allopurinol (ZYLOPRIM) 300 MG tablet Take 1 tablet by mouth daily Yes Tashia Brown MD   hydroCHLOROthiazide (HYDRODIURIL) 25 MG tablet Take 1 tablet by mouth every morning Yes Tashia Brown MD albuterol sulfate HFA (PROVENTIL HFA) 108 (90 Base) MCG/ACT inhaler Inhale 2 puffs into the lungs every 6 hours as needed for Wheezing Yes Bill Beavers MD   Tiotropium Bromide-Olodaterol (STIOLTO RESPIMAT) 2.5-2.5 MCG/ACT AERS 2 inhalations once daily Yes Bill Beavers MD   metFORMIN (GLUCOPHAGE) 500 MG tablet Take 1 tablet by mouth 2 times daily (with meals) Yes Bill Beavers MD   triamcinolone (KENALOG) 0.1 % cream Apply topically as needed Apply topically 2 times daily. Yes Historical Provider, MD   aspirin 81 MG EC tablet Take 81 mg by mouth daily. Yes Historical Provider, MD       Past Medical History:   Diagnosis Date    CAD (coronary artery disease)     Clotting disorder ([facility]     anemic    Compartment syndrome ([facility]     Right arm    Heart murmur     Hyperlipidemia     Hypertension     Ventricular hypertrophy     left       Past Surgical History:   Procedure Laterality Date    AORTIC VALVE REPLACEMENT      sept 2008    ARM SURGERY Right     Compartment syndrome - may 2010    CARPAL TUNNEL RELEASE Bilateral     CORONARY ARTERY BYPASS GRAFT      sept 2008    DIAGNOSTIC CARDIAC CATH LAB PROCEDURE      ROTATOR CUFF REPAIR      with 3 pins placed        Family History   Problem Relation Age of Onset    Alzheimer's Disease Mother     Cancer Father     Heart Disease Brother     Hypertension Brother     Stroke Brother        CareTeam (Including outside providers/suppliers regularly involved in providing care):   Patient Care Team:  Bill Beavers MD as PCP - General (Family Medicine)  Bill Beavers MD as PCP - [facility] Empaneled Provider  Mahnaz Thompson MD as Physician (Cardiology)    Wt Readings from Last 3 Encounters:   01/25/21 219 lb (99.3 kg)   11/05/20 216 lb (98 kg)   07/20/20 219 lb (99.3 kg)     Vitals:    01/25/21 1527   Weight: 219 lb (99.3 kg)   Height: 5' 8\" (1.727 m)     Body mass index is 33.3 kg/m². Based upon direct observation of the patient, evaluation of cognition reveals recent and remote memory intact. Patient's complete Health Risk Assessment and screening values have been reviewed and are found in Flowsheets. The following problems were reviewed today and where indicated follow up appointments were made and/or referrals ordered. Positive Risk Factor Screenings with Interventions:          General Health and ACP:  General  In general, how would you say your health is?: Good  In the past 7 days, have you experienced any of the following?  New or Increased Pain, New or Increased Fatigue, Loneliness, Social Isolation, Stress or Anger?: None of These  Do you get the social and emotional support that you need?: Yes  Do you have a Living Will?: Yes  Advance Directives     Power of  Living Will ACP-Advance Directive ACP-Power of     Not on File Not on File Not on File Not on File      General Health Risk Interventions:  · No intervention is needed at this time    Health Habits/Nutrition:  Health Habits/Nutrition  Do you exercise for at least 20 minutes 2-3 times per week?: (!) No  Have you lost any weight without trying in the past 3 months?: No  Do you eat fewer than 2 meals per day?: No  Have you seen a dentist within the past year?: Yes  Body mass index: (!) 33.3  Health Habits/Nutrition Interventions:  · A healthy diet and routine physical activity encouraged       Personalized Preventive Plan   Current Health Maintenance Status  Immunization History   Administered Date(s) Administered    Hepatitis A 02/26/1998, 01/25/1999    Influenza Virus Vaccine 11/26/2019    Influenza, High Dose (Fluzone 65 yrs and older) 11/13/2018    Pneumococcal Conjugate 13-valent (Altamese Neth) 01/29/2018    Pneumococcal Polysaccharide ([id number]) 02/04/2019        Health Maintenance   Topic Date Due    Diabetic foot exam  06/27/1956    Diabetic retinal exam  06/27/1956  DTaP/Tdap/Td vaccine (1 - Tdap) 06/27/1965    Shingles Vaccine (1 of 2) 06/27/1996    Annual Wellness Visit (AWV)  05/29/2019    Colon Cancer Screen FIT/FOBT  02/04/2020    Lipid screen  04/06/2020    Potassium monitoring  06/03/2020    Creatinine monitoring  06/03/2020    Flu vaccine (1) 09/01/2020    Diabetic microalbuminuria test  01/20/2021    A1C test (Diabetic or Prediabetic)  07/20/2021    Pneumococcal 65+ years Vaccine  Completed    Hepatitis C screen  Completed    Hepatitis A vaccine  Aged Out    Hib vaccine  Aged Out    Meningococcal (ACWY) vaccine  Aged Out     Recommendations for [employer] Due: see orders and patient instructions/AVS.    Recommended screening schedule for the next 5-10 years is provided to the patient in written form: see Patient Instructions/AVS.    Tripp Ordoñez was seen today for medicare awv and 6 month follow-up. Diagnoses and all orders for this visit:    Encounter for Medicare annual wellness exam        -     Routine health maintenance screening was reviewed as above. Electronically signed by Neda Lloyd MD on 1/25/21.

## 2021-01-25 NOTE — PROGRESS NOTES
[address] [place] [address], [facility] [address]  Phone:  [phone]  Fax:  [phone]       Name: Leoncio Valdez  : 1945    Chief Complaint   Patient presents with    Medicare AWV     having times of hands and feet being cold all the time      6 Month Follow-Up       HPI:     Meet Elder \"Westley\" Lindell Cheadle is a 78 yo male who presents today with his wife Tahir Gonzalez for follow-up of hypertension, hyperlipidemia, and T2DM. His last HbA1c was 8.0% in 2020 so he was started on Metformin. He's been trying to eat healthier. For the past 4-5 months his hands and feet feel cold. This happens when he's just sitting in the chair. They live in an old house with wood floors. Diabetes  He presents for his follow-up diabetic visit. He has type 2 diabetes mellitus. Pertinent negatives for hypoglycemia include no pallor. Pertinent negatives for diabetes include no chest pain, no polydipsia, no polyphagia and no polyuria. There are no hypoglycemic complications. There are no diabetic complications. Risk factors for coronary artery disease include diabetes mellitus, dyslipidemia, hypertension and male sex. Current diabetic treatment includes diet. He is compliant with treatment most of the time. He is following a generally healthy diet. An ACE inhibitor/angiotensin II receptor blocker is being taken. Hypertension  This is a chronic problem. The current episode started more than 1 year ago. The problem is unchanged. The problem is controlled. Associated symptoms include shortness of breath (GAN). Pertinent negatives include no anxiety, chest pain, palpitations or peripheral edema. Risk factors for coronary artery disease include dyslipidemia, diabetes mellitus, obesity and male gender. Past treatments include diuretics, beta blockers and angiotensin blockers. The current treatment provides moderate improvement.    Hyperlipidemia  Erythromycin Hives    Sulfa Antibiotics Other (See Comments)     PT DOESN'T REMEMBER    Vasotec [Enalapril] Other (See Comments)     cough       Subjective:      Review of Systems   Constitutional: Negative for chills and fever. HENT: Negative for congestion and rhinorrhea. Eyes: Negative for discharge and redness. Respiratory: Positive for shortness of breath (GAN) and wheezing. Negative for cough and chest tightness. Cardiovascular: Positive for leg swelling. Negative for chest pain and palpitations. Gastrointestinal: Negative for blood in stool, diarrhea, nausea and vomiting. Endocrine: Negative for polydipsia, polyphagia and polyuria. Genitourinary: Negative for difficulty urinating and hematuria. Musculoskeletal: Positive for arthralgias and joint swelling. Skin: Negative for color change and pallor. Hematological: Bruises/bleeds easily. Psychiatric/Behavioral: Negative for self-injury and suicidal ideas. Objective:     /78 (Site: Left Upper Arm, Position: Sitting, Cuff Size: Large Adult)   Pulse 60   Ht 5' 8\" (1.727 m)   Wt 219 lb (99.3 kg)   SpO2 98%   BMI 33.30 kg/m²     Physical Exam  Vitals signs reviewed. Constitutional:       General: He is not in acute distress. Appearance: He is well-developed. HENT:      Head: Normocephalic and atraumatic. Nose: Nose normal.   Eyes:      Conjunctiva/sclera: Conjunctivae normal.   Neck:      Musculoskeletal: Normal range of motion and neck supple. Cardiovascular:      Rate and Rhythm: Normal rate and regular rhythm. Pulmonary:      Effort: Pulmonary effort is normal. No respiratory distress. Breath sounds: Normal breath sounds. No wheezing. Abdominal:      General: Bowel sounds are normal.      Palpations: Abdomen is soft. Musculoskeletal:      Right lower leg: Edema present. Left lower leg: Edema present. Skin:     General: Skin is warm and dry. Findings: No rash. Comments: Scar on right forearm from compartment syndrome. Neurological:      Mental Status: He is alert and oriented to person, place, and time. Psychiatric:         Behavior: Behavior normal.       Assessment/Plan:     Sara Molina was seen today for medicare aw and 6 month follow-up. Diagnoses and all orders for this visit:    Type 2 diabetes mellitus without complication, without long-term current use of insulin ([facility])  -     HbA1c has improved to 7.0% so will continue on current medications. -     Microalbumin / Creatinine Urine Ratio; Future  -     POCT glycosylated hemoglobin (Hb A1C)  -     metFORMIN (GLUCOPHAGE) 500 MG tablet; Take 1 tablet by mouth 2 times daily (with meals)    Essential hypertension  -     Blood pressure is controlled so will continue on current medications. -     Comprehensive Metabolic Panel; Future  -     hydroCHLOROthiazide (HYDRODIURIL) 25 MG tablet; Take 1 tablet by mouth every morning  -     metoprolol tartrate (LOPRESSOR) 50 MG tablet; Take 1 tablet by mouth 2 times daily  -     losartan (COZAAR) 25 MG tablet; Take 1 tablet by mouth daily    Pure hypercholesterolemia  -     A healthy diet and routine physical activity encouraged. -     atorvastatin (LIPITOR) 80 MG tablet; Take 1 tablet by mouth daily    ASCVD (arteriosclerotic cardiovascular disease)  -     warfarin (COUMADIN) 5 MG tablet; TAKE AS DIRECTED PER  DR EATON    Screening for colon cancer  -     POCT Fecal Immunochemical Test (FIT); Future    COPD, group A, by GOLD 2017 classification ([facility]        -     This chronic condition has been stable. -     albuterol sulfate HFA (PROVENTIL HFA) 108 (90 Base) MCG/ACT inhaler; Inhale 2 puffs into the lungs every 6 hours as needed for Wheezing  -     allopurinol (ZYLOPRIM) 300 MG tablet;  Take 1 tablet by mouth daily  -     Tiotropium Bromide-Olodaterol (STIOLTO RESPIMAT) 2.5-2.5 MCG/ACT AERS; 2 inhalations once daily    Chronic systolic congestive heart failure ([address] -     This chronic condition has been stable. Return in 6 months (on 7/25/2021) for Medicare Annual Wellness Visit in 1 year, diabetes, hypertension.     Electronically signed by Kayla David MD on 1/25/2021 at 4:01 PM

## 2021-01-27 NOTE — PROGRESS NOTES
DR Domitila Norris PT   [facility] BIV PACEMAKER CHECK IN OFFICE   BATTERY 8.3-8.6 YRS REMAINING  PRESENTS IN APBVP 86  UNDERLYING ASVS  DDDR   ATRIAL IMPEDENCE 450  RV IMPEDENCE 660  LV IMPEDENCE 480  P WAVES 4.8  RV WAVES >12  ATRIAL THRESHOLD 0.62 @ 0.5  RV THRESHOLD 0.87 @ 0.5  LV THRESHOLD 0.75 @ 0.5    A PACED 41%  BV PACED 91% D/T PVCS    ATRIAL AND VENT AMPLITUDES PROGRAMMED AUTO   VENT AMPLITUDE 2 @ 0.5   NO EPISODES   OPTIVOL WAS ELEVATED BUT NOW BACK TO NORMAL LIMITS

## 2021-02-26 NOTE — TELEPHONE ENCOUNTER
Debby Chaudhry called requesting a refill on the following medications:  Requested Prescriptions     Pending Prescriptions Disp Refills    warfarin (COUMADIN) 5 MG tablet 90 tablet 3     Sig: TAKE AS DIRECTED PER  DR EATON       Date of last visit: 1/25/2021  Date of next visit (if applicable):Visit date not found  Date of last refill:  Pharmacy Name:       [facility], [address] [place] [address]
[id number]

## 2021-03-09 NOTE — PROGRESS NOTES
[facility]     Patient: Darin Cortes, 77 y.o.   : 1945  3/9/2021    Pt of Dr. Rojelio Favre   Patient presents with    Follow-up     COPD 1 year pulmonary follow up         HPI  Peg Cord is here for 1 year  follow up for COPD     MMRC Grade 1:  I get short of breath when hurrying on the level ground, or walking up a slight hill  occ cough and wheezing - at baseline  No exacerbations   Using MICHELLE about 1x per month   Does not get flu vaccine , is UTD with PNA , not sure if going to proceed with COVID vaccine     Previous notes: Dr Phill Silva MD suspicious for PERCY, pt declined PSG in past.   Last [place] : 2019 FEV 1 66%, + air trapping, normal diffusion   Non smoker. Currently using Stiolto respimat 2 puffs daily     On warfarin; PMH CABG. ROS   Review of Systems   Constitutional: Negative for activity change, appetite change, chills, fatigue, fever and unexpected weight change. HENT: Negative. Eyes: Negative. Respiratory: Positive for shortness of breath and wheezing. Negative for cough and chest tightness. Cardiovascular: Negative for chest pain, palpitations and leg swelling. Gastrointestinal: Negative for abdominal pain, diarrhea, nausea and vomiting. Genitourinary: Negative. Musculoskeletal: Negative. Skin: Negative. Neurological: Negative. Hematological: Does not bruise/bleed easily. Psychiatric/Behavioral: Negative for sleep disturbance and suicidal ideas. Physical exam   BP (!) 156/74 (Site: Right Upper Arm, Position: Sitting)   Pulse 68   Temp 97.5 °F (36.4 °C)   Ht 5' 8\" (1.727 m)   Wt 223 lb 3.2 oz (101.2 kg)   SpO2 97% Comment: on ra  BMI 33.94 kg/m²      Wt Readings from Last 3 Encounters:   21 223 lb 3.2 oz (101.2 kg)   21 219 lb (99.3 kg)   20 216 lb (98 kg)     Physical Exam  Vitals signs and nursing note reviewed.    Constitutional:       General: He is not in acute distress. Appearance: He is well-developed. HENT:      Mouth/Throat:      Lips: Pink. Mouth: Mucous membranes are moist.      Pharynx: Oropharynx is clear. No oropharyngeal exudate or posterior oropharyngeal erythema. Eyes:      Conjunctiva/sclera: Conjunctivae normal.   Neck:      Vascular: No JVD. Cardiovascular:      Rate and Rhythm: Normal rate and regular rhythm. Heart sounds: No murmur. No friction rub. Pulmonary:      Effort: Pulmonary effort is normal. No accessory muscle usage or respiratory distress. Breath sounds: Normal breath sounds. No wheezing, rhonchi or rales. Chest:      Chest wall: No tenderness. Musculoskeletal:      Right lower leg: No edema. Left lower leg: No edema. Skin:     General: Skin is warm and dry. Capillary Refill: Capillary refill takes less than 2 seconds. Nails: There is no clubbing. Neurological:      Mental Status: He is alert. Psychiatric:         Mood and Affect: Mood normal.         Behavior: Behavior normal.         Thought Content: Thought content normal.         Judgment: Judgment normal.          Test results   Lung Nodule Screening     [] Qualifies    [x]Does not qualify   [] Declined    [] Completed    In office spirometry - had trouble performing testing, 7 attempts with no matches. Using test #2:  FVC , FEV 1 , and FEF 25-75% ar all reduced, indicating airway obstruction . FeV 1 of 52% indicates moderate airway obstruction while FEF 25-75% indicates sever airway obstruction. Cough variant noted on flow volume curve     Comparison to 2019- there has been a decline in spirometry numbers. Electronically signed by DEAN Ibarra CNP on 3/9/2021                 Assessment      Diagnosis Orders   1. COPD, group A, by GOLD 2017 classification ([address]  Spirometry Without Bronchodilator    Spirometry Without Bronchodilator   2. Moderate COPD (chronic obstructive pulmonary disease) ([address]     3.  Chronic systolic congestive heart failure ([facility]        patient is non smoker, his COPD likely combination of years of second hand smoke exposure and environmental exposures.    Decline in lung function on spirometry, however symptoms stable, no exacerbations     Plan    -continue Stiolto respimat   -PRN albuterol   -avoidance of second hand smoke exposures, wear mask when possible in [place] environments  -do recommend he consider getting the COVID 19 vaccine    Total time spent on encounter was 30 minutes    Will see Jesus Lambert in: 1 year    Electronically signed by DEAN Arguello CNP on 3/9/2021 at 10:50 AM

## 2021-04-16 NOTE — TELEPHONE ENCOUNTER
Westley's wife Sanjay Suresh calls , Shira Larsen tested positive for COVID and she tested negative but she is sure she has it. She wants to know if you prescribe Ivermectin for treating COVID ?

## 2021-04-16 NOTE — TELEPHONE ENCOUNTER
----- Message from Makayla Gomez MD sent at 4/16/2021  8:10 AM EDT -----  COVID test was POSITIVE. He needs to quarantine for 10 days from onset of symptoms. His wife needs to quarantine also.

## 2021-04-16 NOTE — TELEPHONE ENCOUNTER
I do not feel it's necessary.   I recommend OTC symptomatic medication as well as vitamins C, D, and zinc.

## 2021-04-16 NOTE — TELEPHONE ENCOUNTER
Called and spoke to Sumi Petty , gave her Dr. Reese North advice, she is agreeable and feels they have what they need.

## 2021-04-17 NOTE — ED TRIAGE NOTES
Pt presents to the ED via ED lobby with c/o fatigue and diarrhea. Pt states he tested positive for COVID 5 days ago. Pt reports that he has been increasingly fatigued and has chills. Pt denies pain. VSS, respirations even and unlabored.

## 2021-04-18 NOTE — ED NOTES
Pt resting quietly in room no needs expressed. Side rails up x2 with call light in reach. Will continue to monitor.        Kathya Barton, KEREN  04/17/21 4823

## 2021-04-18 NOTE — ED PROVIDER NOTES
[address] [facility] COMPLAINT    Chief Complaint   Patient presents with    Concern For COVID-19    Fatigue       Nurses Notes reviewed and I agree except as noted in the HPI. HPI    Barrington Harvey is a 77 y.o. male who presents for evaluation of Covid infection having fatigue. Patient's condition started 2 days ago as a fever of 100.9 chills diaphoresis tiredness for which they went to the urgent care and got tested for Covid which turned out positive. Patient was called yesterday about the results. Patient condition persisted now having diarrhea drinking less and continues to have fatigue diaphoresis chills. Patient is also having shortness of breath however he had history of COPD, diabetes heart disease artificial valve, pacemaker, heart bypass. Did not have any chest pain, no nausea or vomiting. His shortness of breath not something new and it is not worst.  Patient came in here due to insistence of their wife. .. Patient was prescribed by their relative who is a physician assistant with doxycycline and dexamethasone however the patient only took the doxycycline as he is concerned about his diabetes. REVIEW OF SYSTEMS    Review of Systems   Constitutional: Positive for chills, diaphoresis, fatigue and fever. Negative for appetite change. HENT: Negative for congestion, ear discharge, ear pain, postnasal drip, rhinorrhea, sinus pressure, sore throat, trouble swallowing and voice change. Respiratory: Positive for cough and shortness of breath. Negative for chest tightness and wheezing. Cardiovascular: Negative for chest pain, palpitations and leg swelling. Gastrointestinal: Negative for abdominal pain, constipation, diarrhea, nausea and vomiting. Musculoskeletal: Negative for arthralgias, back pain, joint swelling, myalgias, neck pain and neck stiffness. Skin: Negative for rash.    Neurological: Negative for dizziness, syncope, weakness, light-headedness, numbness and headaches. PAST MEDICAL HISTORY     has a past medical history of CAD (coronary artery disease), Clotting disorder ([facility] Compartment syndrome ([facility] Heart murmur, Hyperlipidemia, Hypertension, and Ventricular hypertrophy. SURGICAL HISTORY   has a past surgical history that includes Aortic valve replacement; Diagnostic Cardiac Cath Lab Procedure; Coronary artery bypass graft; Carpal tunnel release (Bilateral); Arm Surgery (Right); and Rotator cuff repair.     CURRENT MEDICATIONS    Discharge Medication List as of 4/17/2021 10:13 PM      CONTINUE these medications which have NOT CHANGED    Details   !! warfarin (COUMADIN) 5 MG tablet TAKE AS DIRECTED PER  DR EATON, Disp-90 tablet, R-3Normal      metFORMIN (GLUCOPHAGE) 500 MG tablet Take 1 tablet by mouth 2 times daily (with meals), Disp-180 tablet, R-1Print      albuterol sulfate HFA (PROVENTIL HFA) 108 (90 Base) MCG/ACT inhaler Inhale 2 puffs into the lungs every 6 hours as needed for Wheezing, Disp-1 Inhaler, R-5Print      hydroCHLOROthiazide (HYDRODIURIL) 25 MG tablet Take 1 tablet by mouth every morning, Disp-90 tablet, R-2Print      allopurinol (ZYLOPRIM) 300 MG tablet Take 1 tablet by mouth daily, Disp-90 tablet, R-1Print      metoprolol tartrate (LOPRESSOR) 50 MG tablet Take 1 tablet by mouth 2 times daily, Disp-180 tablet, R-1Print      losartan (COZAAR) 25 MG tablet Take 1 tablet by mouth daily, Disp-90 tablet, R-1Print      atorvastatin (LIPITOR) 80 MG tablet Take 1 tablet by mouth daily, Disp-90 tablet, R-1Print      Tiotropium Bromide-Olodaterol (STIOLTO RESPIMAT) 2.5-2.5 MCG/ACT AERS 2 inhalations once daily, Disp-12 g, R-3Print      !! warfarin (COUMADIN) 2 MG tablet AS DIRECTED PER DR EATON, Disp-270 tablet,R-1Normal      nitroGLYCERIN (NITROSTAT) 0.4 MG SL tablet Place 1 tablet under the tongue every 5 minutes as needed for Chest pain, Disp-25 tablet,R-3Normal field reveals rhonchi. Examination of the left-lower field reveals rhonchi. Decreased breath sounds and rhonchi present. No wheezing or rales. Comments: There is a pacemaker in the left chest, sternotomy scars were noted in the anterior chest  Chest:      Chest wall: No tenderness. Abdominal:      General: Bowel sounds are normal.      Palpations: Abdomen is soft. There is no mass. Tenderness: There is no abdominal tenderness. Lymphadenopathy:      Cervical: No cervical adenopathy. Skin:     Findings: No rash. Neurological:      Mental Status: He is alert and oriented to person, place, and time. Psychiatric:         Behavior: Behavior is cooperative. MEDICAL DECISION MAKING    DIFFERENTIAL DIAGNOSIS:    Covid Pneumonia, heart failure, ACS, fatigue, diarrhea secondary to Covid dehydration electrolyte and metabolic disorder      DIAGNOSTIC RESULTS      RADIOLOGY:  I have reviewed radiologic plain film image(s). The plain films will be read or overread by the radiologist.All other non-plain film images(s) such as CT, Ultrasound and MRI have been read by the radiologist.  XR CHEST PORTABLE   Final Result   There is no acute intrathoracic process. **This report has been created using voice recognition software. It may contain minor errors which are inherent in voice recognition technology. **      Final report electronically signed by Dr Andie Barger on 4/17/2021 8:16 PM          LABS:   Labs Reviewed   PROCALCITONIN - Abnormal; Notable for the following components:       Result Value    Procalcitonin 0.10 (*)     All other components within normal limits   C-REACTIVE PROTEIN - Abnormal; Notable for the following components:    CRP 1.63 (*)     All other components within normal limits   CBC WITH AUTO DIFFERENTIAL - Abnormal; Notable for the following components:    WBC 3.5 (*)     Platelets 056 (*)     All other components within normal limits   BASIC METABOLIC PANEL - Abnormal; Notable for the following components:    Potassium 3.4 (*)     Glucose 122 (*)     All other components within normal limits   HEPATIC FUNCTION PANEL - Abnormal; Notable for the following components:    AST 64 (*)     All other components within normal limits   GLOMERULAR FILTRATION RATE, ESTIMATED - Abnormal; Notable for the following components:    Est, Glom Filt Rate 82 (*)     All other components within normal limits   LACTIC ACID, PLASMA   BRAIN NATRIURETIC PEPTIDE   LIPASE   ANION GAP   OSMOLALITY   LACTATE DEHYDROGENASE   PROTIME-INR     All other unresulted laboratory test above are normal:    Vitals:    Vitals:    04/17/21 1933 04/17/21 2052 04/17/21 2204   BP: (!) 142/69 136/68 133/63   Pulse: 74 69 72   Resp: 12 20 15   Temp: 98.7 °F (37.1 °C)     TempSrc: Oral     SpO2: 95% 94% 95%   Weight: 210 lb (95.3 kg)     Height: 5' 8\" (1.727 m)         EMERGENCY DEPARTMENT COURSE:    Medications   0.9 % sodium chloride infusion ( Intravenous Stopped 4/17/21 2222)   0.9 % sodium chloride bolus (0 mLs Intravenous Stopped 4/17/21 2054)   potassium chloride (KLOR-CON M) extended release tablet 40 mEq (40 mEq Oral Given 4/17/21 2205)       The pt was seen and evaluated by me. Within the department, I observed the pt's vitalsigns to be within acceptable range. Laboratory and Radiological studies were performed, results were reviewed with the patient. Within the department, the pt was treated with IV fluid hydration and potassium replacement. Patient was further observed and doing well does not have any respiratory issue, oxygenation remained 94-95 brains at room air. I observed the pt's condition to be hemodynamically stable during the duration of their stay. I explained my proposed course of treatment to the pt, and they were amenable to my decision. They were discharged home, and they will return to the ED if their symptoms become more severein nature, or otherwise change.      Controlled Substances Monitoring:        CRITICAL CARE:   None. CONSULTS:  None    PROCEDURES:  None. FINAL IMPRESSION       1. COVID-19 virus infection    2. Diarrhea, unspecified type    3. Fatigue, unspecified type          DISPOSITION/PLAN  PATIENT REFERRED TO:  Douglas Marques MD  [address] [place]  [phone]    Schedule an appointment as soon as possible for a visit in 3 days      [address] [facility] [address] EMERGENCY DEPT  [address]    As needed    DISCHARGE MEDICATIONS:  Discharge Medication List as of 4/17/2021 10:13 PM      START taking these medications    Details   dexamethasone (DECADRON) 6 MG tablet Take 1 tablet by mouth 2 times daily (with meals) for 6 days, Disp-12 tablet, R-0Print      potassium chloride (KLOR-CON M) 20 MEQ extended release tablet Take 1 tablet by mouth 2 times daily for 3 days, Disp-6 tablet, R-0Print               (Please note that portions of this note were completed with a voice recognition program and electronically transcribed. Efforts were [facility] edit the dictations but occasionally words are mis-transcribed . The transcription may contain errors not detected in proofreading.   This transcription was electronically signed.)     04/17/21 10:26 PM      Иван Hidalgo MD      Emergency room physician           Иван Hidalgo MD  04/17/21 0843

## 2021-04-21 NOTE — PROGRESS NOTES
DR Moe Kay PT  [address] [facility]   BATTERY 8.2-8.4 YRS REMAINING  1 AMS EPISODE OR MODE SWITCH <1% FOR 6 SECONDS      AFIB BURDEN 0%    CORVUE IS ELEVATED  I LM FOR PT TO CALL ME BACK / THIS MESSAGE SENT TO OUR IN BASKET AS WELL UNTIL PT CALLS US BACK

## 2021-04-21 NOTE — TELEPHONE ENCOUNTER
Progress Notes       DR Jenny Bush PT  [address] [facility]   BATTERY 8.2-8.4 YRS REMAINING  1 AMS EPISODE OR MODE SWITCH <1% FOR 6 SECONDS      AFIB BURDEN 0%     CORVUE IS ELEVATED  I LM FOR PT TO CALL ME BACK / THIS MESSAGE SENT TO OUR IN BASKET AS WELL UNTIL PT CALLS US BACK

## 2021-04-22 PROBLEM — U07.1 COVID-19: Status: ACTIVE | Noted: 2021-01-01

## 2021-04-22 NOTE — TELEPHONE ENCOUNTER
Recovering for covid per wife, Aileen Chery, no cough, no appetite, lost weight. Aware to watch him for weight gain and swelling. To call office with issues.   Verbalized understanding

## 2021-04-22 NOTE — ED PROVIDER NOTES
[facility] ENCOUNTER          Pt Name: Jolene Kaur  MRN: [id number]  [place] 1945  Date of evaluation: 4/22/2021  Treating Resident Physician: Get Anders MD  Supervising Physician: Dr. Rosie Guzmán       Chief Complaint   Patient presents with    Shortness of Breath     COVID+     History obtained from the patient and wife. HISTORY OF PRESENT ILLNESS    HPI  Jolene Kaur is a 77 y.o. male with past medical history of COPD, CAD, clotting disorder, hypertension, ventricular hypertrophy, BiV pacemaker,who presents to the emergency department for evaluation of has a worsening shortness of breath and dyspnea on exertion. Patient was seen in the emergency department on 4/17/2021 for COVID-19 infection and shortness of breath. At that time he was not having any significant hypoxia which discharged home on dexamethasone as well as doxycycline. Patient returns for further worsening of his symptoms. He is also been having nonbloody watery diarrhea over the past 3 days. Wife mentions the patient not been maintaining a good appetite and has had little to no p.o. fluid intake over the past 2 days. He complains of fatigue, chills. The patient has no other acute complaints at this time. REVIEW OF SYSTEMS   Review of Systems   Constitutional: Positive for chills and fatigue. Negative for fever. HENT: Negative for rhinorrhea, sinus pain and sore throat. Eyes: Negative for redness. Respiratory: Positive for shortness of breath. Negative for cough. Cardiovascular: Negative for chest pain, palpitations and leg swelling. Gastrointestinal: Negative for abdominal pain, diarrhea, nausea and vomiting. Genitourinary: Negative for dysuria and hematuria. Musculoskeletal: Negative for back pain. Skin: Negative for rash. Neurological: Positive for light-headedness. Negative for headaches. tablet, Rfl: 1    Tiotropium Bromide-Olodaterol (STIOLTO RESPIMAT) 2.5-2.5 MCG/ACT AERS, 2 inhalations once daily, Disp: 12 g, Rfl: 3    warfarin (COUMADIN) 2 MG tablet, AS DIRECTED PER DR EATON, Disp: 270 tablet, Rfl: 1    nitroGLYCERIN (NITROSTAT) 0.4 MG SL tablet, Place 1 tablet under the tongue every 5 minutes as needed for Chest pain, Disp: 25 tablet, Rfl: 3    triamcinolone (KENALOG) 0.1 % cream, Apply topically as needed Apply topically 2 times daily. , Disp: , Rfl:     aspirin 81 MG EC tablet, Take 81 mg by mouth daily. , Disp: , Rfl:       SOCIAL HISTORY     Social History     Social History Narrative    Not on file     Social History     Tobacco Use    Smoking status: Never Smoker    Smokeless tobacco: Never Used   Substance Use Topics    Alcohol use: Yes     Comment: occasionally    Drug use: No         ALLERGIES     Allergies   Allergen Reactions    Erythromycin Hives    Sulfa Antibiotics Other (See Comments)     PT DOESN'T REMEMBER    Vasotec [Enalapril] Other (See Comments)     cough         FAMILY HISTORY     Family History   Problem Relation Age of Onset    Alzheimer's Disease Mother     Cancer Father     Heart Disease Brother     Hypertension Brother     Stroke Brother          PREVIOUS RECORDS   Previous records reviewed: Patient was seen on 4/17/2021 for COVID-19 virus infection. PHYSICAL EXAM     ED Triage Vitals   BP Temp Temp src Pulse Resp SpO2 Height Weight   -- -- -- -- -- -- -- --     Initial vital signs and nursing assessment reviewed and normal. Pulsoximetry is abnormal per my interpretation. Additional Vital Signs:  Vitals:    04/22/21 2223   BP: 127/64   Pulse: 76   Resp: 27   Temp:    SpO2: 91%   Patient is SPO2 was below 93% on room air, with 4 L nasal cannula his saturations were 94%    Physical Exam  Vitals signs and nursing note reviewed. Constitutional:       General: He is in acute distress. Appearance: Normal appearance. He is ill-appearing.  He is not toxic-appearing. HENT:      Head: Normocephalic and atraumatic. Right Ear: External ear normal.      Left Ear: External ear normal.      Nose: Nose normal.      Mouth/Throat:      Mouth: Mucous membranes are dry. Pharynx: Oropharynx is clear. Eyes:      General: No scleral icterus. Conjunctiva/sclera: Conjunctivae normal.   Neck:      Musculoskeletal: Normal range of motion and neck supple. No neck rigidity or muscular tenderness. Cardiovascular:      Rate and Rhythm: Normal rate and regular rhythm. Pulses: Normal pulses. Heart sounds: Normal heart sounds. Pulmonary:      Breath sounds: No wheezing. Comments: Some accessory muscle use, diminished breath sounds bilaterally. Chest:      Chest wall: No tenderness. Abdominal:      General: Abdomen is flat. There is no distension. Palpations: Abdomen is soft. Tenderness: There is no abdominal tenderness. There is no guarding or rebound. Musculoskeletal: Normal range of motion. Right lower leg: No edema. Left lower leg: No edema. Lymphadenopathy:      Cervical: No cervical adenopathy. Skin:     General: Skin is warm and dry. Capillary Refill: Capillary refill takes less than 2 seconds. Coloration: Skin is not jaundiced. Neurological:      General: No focal deficit present. Mental Status: He is alert and oriented to person, place, and time. Psychiatric:         Mood and Affect: Mood normal.         Behavior: Behavior normal.         MEDICAL DECISION MAKING   Initial Assessment: This is a 67-year-old male with multiple comorbidities who comes in with progressively worsening of his shortness of breath and dyspnea on exertion since being diagnosed with COVID-19 infection. He was in the emergency room for [phone] and since then his symptoms have progressively worsened. He was hypoxic upon arrival requiring 4 L nasal cannula to maintain saturations above 92%.   Since being discharged patient was given doxycycline as well as dexamethasone 6 mg orally twice daily. MDM:   We will obtain laboratory studies including the LDH, ferritin, CRP, D-dimer per the Covid severity classification score. Discussion occurred with wife and patient regarding the need for admission after his laboratory studies were obtained. Also obtain a chest ray. Patient has been taking steroids and doxycycline at home. ED RESULTS   Laboratory results:  Labs Reviewed   CBC WITH AUTO DIFFERENTIAL - Abnormal; Notable for the following components:       Result Value    Segs Absolute 8.0 (*)     Lymphocytes Absolute 0.7 (*)     All other components within normal limits   COMPREHENSIVE METABOLIC PANEL W/ REFLEX TO MG FOR LOW K - Abnormal; Notable for the following components:    Glucose 300 (*)     BUN 28 (*)     CO2 22 (*)     AST 47 (*)     All other components within normal limits   C-REACTIVE PROTEIN - Abnormal; Notable for the following components:    CRP 6.12 (*)     All other components within normal limits   PROCALCITONIN   ANION GAP   OSMOLALITY   GLOMERULAR FILTRATION RATE, ESTIMATED   BRAIN NATRIURETIC PEPTIDE   D-DIMER, QUANTITATIVE   TROPONIN   PROTIME-INR   APTT   FERRITIN   LACTATE DEHYDROGENASE       Radiologic studies results:  XR CHEST PORTABLE   Final Result   CHF with increased central pulmonary vascular congestion and mild    interstitial edema. This document has been electronically signed by: Breana Carolina MD on    04/22/2021 08:58 PM          ED Medications administered this visit:   Medications   ipratropium-albuterol (DUONEB) nebulizer solution 1 ampule (1 ampule Inhalation Given 4/22/21 2103)   0.9 % sodium chloride bolus (0 mLs Intravenous Stopped 4/22/21 2145)         ED COURSE     ED Course as of Apr 22 2241   Thu Apr 22, 2021 2049 Glucose(!): 300 [AL]   2049 BUN(!): 28 [AL]   2049 AST(!): 47 [AL]   2049 Within normal limits.    CBC Auto Differential(!):    WBC 9.2   RBC 5.20   Hemoglobin Quant

## 2021-04-23 NOTE — H&P
History & Physical       Patient: Francis Nguyen  YOB: 1945    MRN: [id number]     Acct: [de-identified]    PCP: Bonny Ramirez MD    Date of Admission: 4/22/2021    Date of Service: Patient seen / examined on 04/22/21 and admitted to inpatient with expected LOS greater than two midnights due to medical therapy. ASSESSMENT / PLAN:    Acute hypoxic respiratory failure / COVID-19 pneumonia   Requiring 5L NC to maintain SpO2 > 94%. No clinical indicators of sepsis. Low suspicion for superimposed bacterial infection with negative procalcitonin. D-Dimer nml. CRP 6. 12. Admit to med/tele. Droplet plus precautions. Continue supplemental oxygen / wean as tolerated to maintain SpO2 > 94%. Decadron. Prophylactic lovenox deferred (supratherapeutic INR on coumadin). Not a candidate for tocilizumab given CRP 6. 12. No longer prescribing remdesivir, convalescent plasma or vitamins C/D/zinc per protocol. Remaining COVID labs pending. Pulmonary hygiene. Supportive care. AST elevation  Mild. Consistent with COVID-19 infection. Trend daily. NIDDM2, with hyperglycemia  A1C 7.0 in 1/2021. Recent outpatient steroid use contributing. Will be receiving IV steroids during hospitalization. Metformin held. SSI initiated (target IP -180). POCT BG checks. Hypoglycemia protocol. Carb control diet. S/p mechanical AVR (anticoagulated on coumadin)  INR 12.1 on arrival (target 2.5-3.5). No active bleeding assessed. Lower-dose (2 mg IV) vitamin K administered given indication for coumadin. PT/INR daily. Pharmacy to dose warfarin when resumed. Monitor clinically for signs/symptoms of bleeding. Stress ulcer prophylaxis. Primary HTN  Controlled. HCTZ, BB, ARB resumed. Monitor BP. CAD / HLD   Daily ASA, statin resumed (AST < 3x upper limit of normal). COPD (patient-reported)  Without exacerbation. Never smoker. Pulmonary hygiene as above. Complete heart block (s/p BIV)  Noted.  A-V paced on EKG.    Chronic systolic CHF with recovered EF (50-55% per TTE 11/2019 / s/p AICD)  Compensated. BB, ARB resumed / unclear why not prescribed spironolactone. No outpatient loop diuretics listed. Monitor I/O, daily weights. ?Gout  Without exacerbation. Allopurinol resumed. Chief Complaint: shortness of breath    History of Present Illness:  78 y/o M PMHx HTN, HLD, CAD, NIDDM2, ? COPD (never-smoker), chronic systolic CHF with recovered EF (50-55% per TTE 11/2019 / s/p AICD), complete heart block (s/p BIV) and mechanical AVR (anticoagulated on coumadin) -- presents to [facility] with a chief complaint of shortness of breath. History obtained from the patient. Patient tested positive for COVID approximately 10 days ago / has experienced worsening shortness of breath and fatigue since diagnosis / + decreased oral intake, watery diarrhea for the past few days. Evaluated recently in the ED (4/17) / no hypoxia / discharged on decadron/doxycycline with continued progression of symptoms. Denies fever, emesis, chest pain, palpitations, abdominal pain or urinary symptoms. SpO2 at home: 82% on RA / decided to proceed to the ED for evaluation. Denies sick contacts. ED course: afebrile, BP/HR nml, tachypnic (20s) with SpO2 83% on RA > 94% on 5L NC. Workup remarkable for: , BUN 28 (Cr 0.8), AST 47, CRP 6. 12. CBC nml. Lytes nml. Procal neg. BNP nml. Trop neg. D-Dimer nml. EKG: AV dual-paced with occasional PVCs. Portable CXR: CHF with increased central pulmonary vascular congestion and mild interstitial edema. Received 500 cc IVF bolus, duoneb in the ED. Hospitalist service contacted for admission. Please see A&P for additional details. Past Medical History:    Diagnosis Date    CAD (coronary artery disease)     Complete heart block     Hyperlipidemia     Hypertension     Mechanical AVR    COPD (patient-reported)  NIDDM2  ? Gout    Past Surgical History:    Procedure Laterality Date    AORTIC VALVE REPLACEMENT sept 2008    ARM SURGERY Right     Compartment syndrome - may 2010    CARPAL TUNNEL RELEASE Bilateral     CORONARY ARTERY BYPASS GRAFT      sept 2008    DIAGNOSTIC CARDIAC CATH LAB PROCEDURE      ROTATOR CUFF REPAIR      with 3 pins placed       Medications Prior to Admission:  Medication Sig    atorvastatin (LIPITOR) 80 MG tablet TAKE 1 TABLET BY MOUTH EVERY DAY     dexamethasone (DECADRON) 6 MG tablet Take 1 tablet by mouth 2 times daily (with meals) for 6 days    potassium chloride (KLOR-CON M) 20 MEQ extended release tablet Take 1 tablet by mouth 2 times daily for 3 days    warfarin (COUMADIN) 5 MG tablet TAKE AS DIRECTED PER  DR EATON    metFORMIN (GLUCOPHAGE) 500 MG tablet Take 1 tablet by mouth 2 times daily (with meals)    albuterol sulfate HFA (PROVENTIL HFA) 108 (90 Base) MCG/ACT inhaler Inhale 2 puffs into the lungs every 6 hours as needed for Wheezing    hydroCHLOROthiazide (HYDRODIURIL) 25 MG tablet Take 1 tablet by mouth every morning    allopurinol (ZYLOPRIM) 300 MG tablet Take 1 tablet by mouth daily    metoprolol tartrate (LOPRESSOR) 50 MG tablet Take 1 tablet by mouth 2 times daily    losartan (COZAAR) 25 MG tablet Take 1 tablet by mouth daily    Tiotropium Bromide-Olodaterol (STIOLTO RESPIMAT) 2.5-2.5 MCG/ACT AERS 2 inhalations once daily    warfarin (COUMADIN) 2 MG tablet AS DIRECTED PER DR EATON    nitroGLYCERIN (NITROSTAT) 0.4 MG SL tablet Place 1 tablet under the tongue every 5 minutes as needed for Chest pain    triamcinolone (KENALOG) 0.1 % cream Apply topically as needed Apply topically 2 times daily.  aspirin 81 MG EC tablet Take 81 mg by mouth daily.        Allergies:   Erythromycin, Sulfa antibiotics, and Vasotec [enalapril]    Social History:   Socioeconomic History    Marital status:    Tobacco Use    Smoking status: Never Smoker    Smokeless tobacco: Never Used   Substance and Sexual Activity    Alcohol use: Yes     Comment: occasionally    Drug use: No     Family History:   Problem Relation Age of Onset    Alzheimer's Disease Mother     Cancer Father     Heart Disease Brother     Hypertension Brother     Stroke Brother      REVIEW OF SYSTEMS:  A 14-point ROS was obtained and negative, with the exception of pertinent positives as stated in the HPI. PHYSICAL EXAM:  Vitals:    04/22/21 1951 04/22/21 2103 04/22/21 2223   BP: (!) 149/57  127/64   Pulse: 63  76   Resp: 19 20 27   Temp: 98.8 °F (37.1 °C)     TempSrc: Oral     SpO2: 94% 95% 91%   Weight: 203 lb (92.1 kg)     Height: 5' 8\" (1.727 m)       General appearance: Lethargic-appearing  male. Pleasant. Cooperative. NAD. HEENT: Normocephalic / atraumatic. Conjunctivae appear normal.  Neck: Supple. No JVD. Respiratory: Unlabored on 5L NC. Lung sounds diminished and coarse to auscultation bilaterally. No rales / rhonchi. No conversational dyspnea / accessory muscle use. Cardiovascular: RRR. Normal S1/S2. No murmurs / rubs / gallops. + Click (s/p mechanical AVR). Abdomen: Soft / non-tender / non-distended. BS present. Musculoskeletal: No cyanosis. 1+ pitting edema to BLE. Skin: Warm / dry. No pallor / diaphoresis. Neurologic: A/O x 3. Speech normal. Answers questions appropriately. No obvious focal neurologic deficits. Psychiatric: Thought content / judgment / insight appear appropriate. Peripheral pulses: Normal bilaterally.     Labs:   Results for orders placed or performed during the hospital encounter of 04/22/21   CBC Auto Differential   Result Value Ref Range    WBC 9.2 4.8 - 10.8 thou/mm3    RBC 5.20 4.70 - 6.10 mill/mm3    Hemoglobin 15.6 14.0 - 18.0 gm/dl    Hematocrit 46.5 42.0 - 52.0 %    MCV 89.4 80.0 - 94.0 fL    MCH 30.0 26.0 - 33.0 pg    MCHC 33.5 32.2 - 35.5 gm/dl    RDW-CV 13.2 11.5 - 14.5 %    RDW-SD 43.3 35.0 - 45.0 fL    Platelets 478 019 - 065 thou/mm3    MPV 10.7 9.4 - 12.4 fL    Seg Neutrophils 87.2 %    Lymphocytes 7.3 %    Monocytes 5.1 %    Eosinophils 0.0 % Basophils 0.0 %    Immature Granulocytes 0.4 %    Segs Absolute 8.0 (H) 1 - 7 thou/mm3    Lymphocytes Absolute 0.7 (L) 1.0 - 4.8 thou/mm3    Monocytes Absolute 0.5 0.4 - 1.3 thou/mm3    Eosinophils Absolute 0.0 0.0 - 0.4 thou/mm3    Basophils Absolute 0.0 0.0 - 0.1 thou/mm3    Immature Grans (Abs) 0.04 0.00 - 0.07 thou/mm3    nRBC 0 /100 wbc   Comprehensive Metabolic Panel w/ Reflex to MG   Result Value Ref Range    Glucose 300 (H) 70 - 108 mg/dL    CREATININE 0.8 0.4 - 1.2 mg/dL    BUN 28 (H) 7 - 22 mg/dL    Sodium 135 135 - 145 meq/L    Potassium reflex Magnesium 3.9 3.5 - 5.2 meq/L    Chloride 100 98 - 111 meq/L    CO2 22 (L) 23 - 33 meq/L    Calcium 9.0 8.5 - 10.5 mg/dL    AST 47 (H) 5 - 40 U/L    Alkaline Phosphatase 59 38 - 126 U/L    Total Protein 7.0 6.1 - 8.0 g/dL    Albumin 3.6 3.5 - 5.1 g/dL    Total Bilirubin 1.0 0.3 - 1.2 mg/dL    ALT 61 11 - 66 U/L   Procalcitonin   Result Value Ref Range    Procalcitonin 0.08 0.01 - 0.09 ng/mL   Anion Gap   Result Value Ref Range    Anion Gap 13.0 8.0 - 16.0 meq/L   Osmolality   Result Value Ref Range    Osmolality Calc 286.8 275.0 - 300.0 mOsmol/kg   Glomerular Filtration Rate, Estimated   Result Value Ref Range    Est, Glom Filt Rate >90 ml/min/1.73m2   Brain Natriuretic Peptide   Result Value Ref Range    Pro-.7 0.0 - 900.0 pg/mL   D-Dimer, Quantitative   Result Value Ref Range    D-Dimer, Quant 283.00 0.00 - 500.00 ng/ml FEU   C-Reactive Protein   Result Value Ref Range    CRP 6.12 (H) 0.00 - 1.00 mg/dl   Troponin   Result Value Ref Range    Troponin T < 0.010 ng/ml   EKG 12 Lead   Result Value Ref Range    Ventricular Rate 64 BPM    Atrial Rate 64 BPM    P-R Interval 162 ms    QRS Duration 116 ms    Q-T Interval 456 ms    QTc Calculation (Bazett) 470 ms    P Axis 59 degrees    R Axis 29 degrees    T Axis 84 degrees     Narrative & Impression  AV dual-paced rhythm with occasional Premature ventricular complexes  Abnormal ECG  When compared with ECG of 03-JUN-2019 14:15  Premature ventricular complexes are now Present  Ventricular rate has increased by 4 BPM     Radiology:     Xr Chest Portable  Result Date: 4/22/2021  1 view chest x-ray Comparison:  CR,SR  - XR CHEST PORTABLE  - 04/17/2021 08:02 PM EDT Findings: Normal size heart. Increased central pulmonary vascular congestion. Stable left AICD leads in the right atrium and right ventricle. Intact median sternotomy wires. Mild prominent interstitial markings may suggest underlying edema. No acute fracture. CHF with increased central pulmonary vascular congestion and mild interstitial edema. This document has been electronically signed by: Nacho Gleason MD on 04/22/2021 08:58 PM    CODE STATUS: FULL    Thank you Rodney Lambert MD for the opportunity to be involved in this patient's care.     Electronically signed by Rubina Rosa MD on 4/22/2021

## 2021-04-23 NOTE — PROGRESS NOTES
Spoke with wife and updated of patient's condition and plan of care. All questions and concerns addressed at this time. Pt is compliant with new orders and appetite is improving. Weaned down on HHF and tolerating well.

## 2021-04-23 NOTE — PROGRESS NOTES
Pt admitted to 3A at 23:50. Vital signs obtained, pt in stable condition on 5L NC. Assessment and admission data collection initiated. Pt oriented to room and all questions answered with no further questions at this time. Fall prevention and safety precautions discussed with patient per [facility] policy.      Electronically signed by Terry Beatty RN on 4/23/2021 at 3:59 AM

## 2021-04-23 NOTE — PLAN OF CARE
Problem: Body Temperature -  Risk of, Imbalanced  Goal: Ability to maintain a body temperature within defined limits  Outcome: Met This Shift  Note: Pt remains afebrile at this time. Problem: Body Temperature -  Risk of, Imbalanced  Goal: Complications related to the disease process, condition or treatment will be avoided or minimized  Outcome: Met This Shift     Problem: Isolation Precautions - Risk of Spread of Infection  Goal: Prevent transmission of infection  Outcome: Met This Shift  Note: PPE is being used properly at all times and infection prevention techniques were explained to the pt. Problem: Risk for Fluid Volume Deficit  Goal: Maintain normal heart rhythm  Outcome: Met This Shift  Note: Pt's heart rate/rhythm is within his normal parameters. Pt is on continuous bedside monitor. Problem: Risk for Fluid Volume Deficit  Goal: Maintain absence of muscle cramping  Outcome: Met This Shift  Note: Pt denies muscle cramps at this time. Problem: Risk for Fluid Volume Deficit  Goal: Maintain normal serum potassium, sodium, calcium, phosphorus, and pH  Outcome: Met This Shift  Note: Pt's electrolytes and pH are WDL at this time. Problem: Loneliness or Risk for Loneliness  Goal: Demonstrate positive use of time alone when socialization is not possible  Outcome: Met This Shift  Note: Pt watches TV or uses his phone when socialization is not possible. Problem: Patient Education: Go to Patient Education Activity  Goal: Patient/Family Education  Outcome: Met This Shift  Note: Educated pt on meds and COVID-19 infection treatment and precautions to take while in the hospital. See education section for details. Problem: Falls - Risk of:  Goal: Will remain free from falls  Description: Will remain free from falls  Outcome: Met This Shift  Note: Patient remains free from falls at this time. Bed is locked in lowest position with alarm on and side rails up.  Call light and personal belongings are within reach. \"Call, don't fall\" policy explained. Fall wristband and nonslip socks are on patient and fall risk sign is posted. Problem: Falls - Risk of:  Goal: Absence of physical injury  Description: Absence of physical injury  Outcome: Met This Shift  Note: Patient remains free from physical injury at this time. Safety precautions explained and in place per [facility] policy. Problem: Airway Clearance - Ineffective  Goal: Achieve or maintain patent airway  Outcome: Ongoing  Note: Pt's lungs sound clear/diminished and pt denies SOB at this time. High flow NC was just ordered to maintain SpO2 >90%. Will continue to monitor. Problem: Gas Exchange - Impaired  Goal: Absence of hypoxia  Outcome: Ongoing  Note: Pt's lungs sound clear/diminished and pt denies SOB at this time. High flow NC was just ordered to maintain SpO2 >90%. Will continue to monitor. Problem: Gas Exchange - Impaired  Goal: Promote optimal lung function  Outcome: Ongoing  Note: Explained importance of coughing and deep breathing. Lung sounds are clear/diminished at this time and pt denies SOB. Problem: Breathing Pattern - Ineffective  Goal: Ability to achieve and maintain a regular respiratory rate  Outcome: Ongoing  Note: Pt's respiratory rate is generally 18-28 respirations per minute depending on activity level. Problem: Nutrition Deficits  Goal: Optimize nutritional status  Outcome: Ongoing  Note: Dietary consult needed. Problem: Fatigue  Goal: Verbalize increase energy and improved vitality  Outcome: Not Met This Shift  Note: Pt reports fatigue and poor quality/quantity of sleep while at the hospital.     Problem: Body Temperature -  Risk of, Imbalanced  Goal: Will regain or maintain usual level of consciousness  Outcome: Completed  Note: Pt is A&Ox4. He is at his baseline level of consciousness. Care plan reviewed with patient.   Patient verbalizes understanding of the plan of care and contributes to goal setting.

## 2021-04-23 NOTE — PROGRESS NOTES
Hospitalist Progress Note    Patient:  Alejandro Grace      Unit/Bed:3A-01/001-A    YOB: 1945    MRN: [id number]       Acct: [de-identified]     PCP: Rodney Lambert MD    Date of Admission: 4/22/2021    Active Hospital Problems    Diagnosis Date Noted    COVID-19 [U07.1] 04/22/2021     Assessment/Plan:    1. Acute hypoxic respiratory failure 2/2 COVID-19 PNA with Superimposed Pulmonary Edema: Initially on 5L NC, now on HFNC. Cont PO Decadron, will give Taclozumab. Cont Vit D, C, Zinc, Pepcid, Flonase. One time dose of IV Lasix 40. Prophylactic lovenox deferred (supratherapeutic INR on coumadin). Pulmonary hygiene. Supportive care. 2. AST elevation: Mild. Consistent with COVID-19 infection. Trend daily.     3. NIDDM2, with hyperglycemia: A1C 7.0 in 1/2021. Metformin held. SSI initiated and Lantus 10U started. (target IP -180). POCT BG checks. Hypoglycemia protocol. Carb control diet.     4. S/p mechanical AVR (anticoagulated on coumadin): INR 12.1 on arrival (target 2.5-3.5). No active bleeding assessed. Lower-dose (2 mg IV) vitamin K administered given indication for coumadin. PT/INR daily. Pharmacy to dose warfarin when resumed. Monitor clinically for signs/symptoms of bleeding. Stress ulcer prophylaxis.     5. Primary HTN:  HCTZ, BB, ARB resumed. Monitor BP.     6. CAD / HLD: Daily ASA, statin resumed (AST < 3x upper limit of normal).     7. COPD (patient-reported): Without exacerbation. Never smoker. Pulmonary hygiene as above.     8. Complete heart block (s/p BIV): Noted. A-V paced on EKG.     9. Acute on Chronic systolic CHF with recovered EF (50-55% per TTE 11/2019 / s/p AICD): Will give IV Lasix 40 x 1 dose. BB, ARB resumed / unclear why not prescribed spironolactone. No outpatient loop diuretics listed. Monitor I/O, daily weights.     10. Gout: Without exacerbation. Allopurinol resumed.           Chief Complaint: shortness of breath    Hospital Course: 76 y/o M PMHx HTN, HLD, CAD, NIDDM2, ? COPD (never-smoker), chronic systolic CHF with recovered EF (50-55% per TTE 11/2019 / s/p AICD), complete heart block (s/p BIV) and mechanical AVR (anticoagulated on coumadin) -- presents to [facility] with a chief complaint of shortness of breath. History obtained from the patient.     Patient tested positive for COVID approximately 10 days ago / has experienced worsening shortness of breath and fatigue since diagnosis / + decreased oral intake, watery diarrhea for the past few days. Evaluated recently in the ED (4/17) / no hypoxia / discharged on decadron/doxycycline with continued progression of symptoms. Denies fever, emesis, chest pain, palpitations, abdominal pain or urinary symptoms. SpO2 at home: 82% on RA / decided to proceed to the ED for evaluation. Denies sick contacts.     ED course: afebrile, BP/HR nml, tachypnic (20s) with SpO2 83% on RA > 94% on 5L NC. Workup remarkable for: , BUN 28 (Cr 0.8), AST 47, CRP 6. 12. CBC nml. Lytes nml. Procal neg. BNP nml. Trop neg. D-Dimer nml. EKG: AV dual-paced with occasional PVCs. Portable CXR: CHF with increased central pulmonary vascular congestion and mild interstitial edema. Received 500 cc IVF bolus, duoneb in the ED. Hospitalist service contacted for admission. Please see A&P for additional details. Subjective: no acute events overnight. Pt sitting up in chair doing well, denies any complaints. Denies any fevers, chills, sob or cough. Poor PO intake.        Medications:  Reviewed    Infusion Medications    sodium chloride      dextrose       Scheduled Medications    dexamethasone  6 mg Intravenous Daily    allopurinol  300 mg Oral Daily    aspirin  81 mg Oral Daily    atorvastatin  80 mg Oral Nightly    hydroCHLOROthiazide  25 mg Oral QAM    [Held by provider] losartan  25 mg Oral Daily    metoprolol tartrate  50 mg Oral BID    sodium chloride flush  10 mL Intravenous 2 times per day    glycopyrrolate-formoterol  2 puff Inhalation BID    insulin lispro  0-12 Units Subcutaneous TID WC    insulin lispro  0-6 Units Subcutaneous Nightly    ascorbic acid  1,000 mg Oral Daily    zinc sulfate  50 mg Oral Daily    fluticasone  1 spray Each Nostril Daily    famotidine (PEPCID) injection  20 mg Intravenous BID    Vitamin D  1,000 Units Oral Daily    insulin glargine  10 Units Subcutaneous QAM AC     PRN Meds: ipratropium-albuterol, sodium chloride flush, sodium chloride, promethazine **OR** ondansetron, acetaminophen **OR** acetaminophen, glucose, dextrose, glucagon (rDNA), dextrose      Intake/Output Summary (Last 24 hours) at 4/23/2021 1011  Last data filed at 4/23/2021 0346  Gross per 24 hour   Intake 208.17 ml   Output 300 ml   Net -91.83 ml       Diet:  DIET GENERAL; Carb Control: 3 carb choices (45 gms)/meal; Low Sodium (2 GM)    Exam:  BP (!) 124/54   Pulse 62   Temp 98.5 °F (36.9 °C) (Oral)   Resp 21   Ht 5' 8\" (1.727 m)   Wt 204 lb 14.4 oz (92.9 kg)   SpO2 94%   BMI 31.15 kg/m²     General appearance: Lethargic-appearing  male. Pleasant. Cooperative. NAD. On HFNC  HEENT: Normocephalic / atraumatic. Conjunctivae appear normal.  Neck: Supple. No JVD. Respiratory: Unlabored on 5L NC. Lung sounds diminished and coarse to auscultation bilaterally. No rales / rhonchi. No conversational dyspnea / accessory muscle use. Cardiovascular: RRR. Normal S1/S2. No murmurs / rubs / gallops. + Click (s/p mechanical AVR). Abdomen: Soft / non-tender / non-distended. BS present. Musculoskeletal: No cyanosis. 1+ pitting edema to BLE. Skin: Warm / dry. No pallor / diaphoresis. Neurologic: A/O x 3. Speech normal. Answers questions appropriately. No obvious focal neurologic deficits. Psychiatric: Thought content / judgment / insight appear appropriate. Peripheral pulses: Normal bilaterally.       Labs:   Recent Labs     04/23/21  0320   WBC 8.2   HGB 13.9*   HCT 40.6*        Recent Labs     04/23/21  0320      K 4.1    CO2 20*   BUN 24*   CREATININE 0.7   CALCIUM 8.7   PHOS 2.8     Recent Labs     04/23/21  0320   AST 34   ALT 47   BILITOT 0.7   ALKPHOS 50     Recent Labs     04/23/21  0320   INR 11.13*     Recent Labs     04/23/21  0320   CKTOTAL 107       Urinalysis:      Lab Results   Component Value Date    NITRU NEGATIVE 11/01/2018    BLOODU NEGATIVE 11/01/2018    GLUCOSEU NEGATIVE 11/01/2018       Radiology:   All imaging reviewed     Diet: DIET GENERAL; Carb Control: 3 carb choices (45 gms)/meal; Low Sodium (2 GM)      Code Status: Full Code            Electronically signed by Chance Allison MD on 4/23/2021 at 10:11 AM

## 2021-04-23 NOTE — PROGRESS NOTES
Pharmacy Consult      Kaur Thorpe is a 77 y.o. male for whom pharmacy has been consulted to dose tocilizumab. Patient Active Problem List   Diagnosis    Ventricular hypertrophy    Hyperlipidemia    Hypertension    S/P AVR (aortic valve replacement)    ASCVD (arteriosclerotic cardiovascular disease)    Gout    Presbycusis of both ears    Asthma    Osteoarthritis    Cardiac dysrhythmia    Orthostatic hypotension    Warfarin anticoagulation    Heart block    Syncope and collapse    Biventricular cardiac pacemaker in situ    COPD, group A, by GOLD 2017 classification ([address]    Chronic systolic congestive heart failure ([address]    COVID-19       Allergies:  Erythromycin, Sulfa antibiotics, and Vasotec [enalapril]     Recent Labs     04/22/21 2008 04/23/21  0320   CREATININE 0.8 0.7       Ht/Wt:   Ht Readings from Last 1 Encounters:   04/22/21 5' 8\" (1.727 m)        Wt Readings from Last 1 Encounters:   04/23/21 204 lb 14.4 oz (92.9 kg)         Estimated Creatinine Clearance: 102 mL/min (based on SCr of 0.7 mg/dL). Assessment/Plan:    Pt admitted 4/22/21, initial positive test was 4/13/21. Requiring high flow nasal cannula. Receiving dexamethasone 6 mg daily. CRP 8.17. No contraindications present. Will give 800 mg tocilizumab IV once. Thank you for the consult. Will continue to follow.

## 2021-04-23 NOTE — PROGRESS NOTES
Comprehensive Nutrition Assessment    Type and Reason for Visit:  Initial, Positive Nutrition Screen(poor appetite; diarrhea, wt. loss; MST 2)    Nutrition Recommendations/Plan:   Continue diet per MD   Begin Ensure High Protein TID (low cho ONS)  Encouraged yogurt daily for probiotic benefits - recommend culturelle daily as po intake is poor    Nutrition Assessment:    Pt. nutritionally compromised AEB reported poor intake and diarrhea ~1 week pta continuing HD #1. At risk for further nutrition compromise r/t admit (+) covid  and underlying medical condition HTN, DM (A1c 7% Jan 2021), CAD, AVR. Nutrition recommendations/interventions as per above. Malnutrition Assessment:  Malnutrition Status: At risk for malnutrition (Comment)    Context:  Acute Illness     Findings of the 6 clinical characteristics of malnutrition:  Energy Intake:  1 - 75% or less of estimated energy requirements for 7 or more days  Weight Loss:  Unable to assess(only pt. stated weights available)     Body Fat Loss:  No significant body fat loss     Muscle Mass Loss:  No significant muscle mass loss    Fluid Accumulation:  No significant fluid accumulation     Strength:  Not Performed    Estimated Daily Nutrient Needs:  Energy (kcal):  5292-4633 (30-32/kgm IBW) late phase; Weight Used for Energy Requirements:  Ideal(154# - 70kgm)     Protein (g):  ~140 gms (2/kgm IBW);  Weight Used for Protein Requirements:  Ideal        Fluid (ml/day):  per MD;     Nutrition Related Findings:  covid Dx 4/13; pt. seen this am ; on HFNC; has had poor po intake past ~1 week with diarrhea; no taste or smell changes or N/V; BM 0 today so far; pt. feels he has lost \"a little\" - based on UBW it is ~6# or 3% in a week but only pt. stated wt available; pt. checks his glucose and INR weekly at home - glucose usually ~130 and A1c was 7% Jan 2021; pt. limits sugars and salt - only uses pepper; likes yogurt - discussed probiotic benefits; meds include lasix, lantus, humalog, vitamin C and D, zinc, decadron; glucose 328 and 271 today  BUN 24  creatinine 0.7  MAP 74; will try ONS; ate ~5% of tray this am per RN      Wounds:  None       Current Nutrition Therapies:    DIET GENERAL; Carb Control: 3 carb choices (45 gms)/meal; Low Sodium (2 GM)  Dietary Nutrition Supplements: Low Calorie High Protein Supplement TID    Anthropometric Measures:  · Height: 5' 8\" (172.7 cm)  · Current Body Weight: 204 lb (92.5 kg)(4/23 with no edema)   · Admission Body Weight: 204 lb (92.5 kg)(4/23 with no edema)    · Usual Body Weight: 210 lb (95.3 kg)(per pt.; Epic wt. records of unknown source)   Ideal Body Weight: 154 lbs;   · BMI: 31  · BMI Categories: Obese Class 1 (BMI 30.0-34. 9)       Nutrition Diagnosis:   · Inadequate oral intake related to catabolic illness(covid) as evidenced by intake 0-25%      Nutrition Interventions:   Food and/or Nutrient Delivery:  Continue Current Diet, Start Oral Nutrition Supplement  Nutrition Education/Counseling:  Education initiated(4/23 encouraged continued low sodium diet; yogurt for probiotics; discussed ONS use)   Coordination of Nutrition Care:  Continue to monitor while inpatient, Interdisciplinary Rounds    Goals:  pt. to consume greater than 75% of meals and ONS during LOS       Nutrition Monitoring and Evaluation:   Behavioral-Environmental Outcomes:  None Identified   Food/Nutrient Intake Outcomes:  Food and Nutrient Intake, Supplement Intake  Physical Signs/Symptoms Outcomes:  Biochemical Data, GI Status, Fluid Status or Edema, Hemodynamic Status, Skin, Weight     Discharge Planning:     Too soon to determine     Electronically signed by Jane Alston RD, LD on 4/23/21 at 1:14 PM EDT    Contact: [phone]

## 2021-04-23 NOTE — CARE COORDINATION
4/23/21, 8:13 AM EDT  DISCHARGE PLANNING EVALUATION:    Alejandro Grace       Admitted: 4/22/2021/ [facility] day: 1   Location: -01/001-A Reason for admit: COVID-19 [U07.1]   PMH:  has a past medical history of CAD (coronary artery disease), Clotting disorder ([facility] [address] Compartment syndrome ([facility] [address] Heart murmur, Hyperlipidemia, Hypertension, and Ventricular hypertrophy. Procedure:   CXR:  CHF with increased central pulmonary vascular congestion and mild   interstitial edema. Barriers to Discharge: To ED with SOB, worsening shortness of breath and dyspnea on exertion. Patient was seen in the emergency department on 4/17/2021 for COVID-19 infection and shortness of breath. At that time he was not having any significant hypoxia which discharged home on dexamethasone as well as doxycycline. Telemetry, on high flow, 40 L/min, FiO2 70%,  sats 94%, + covid on 04/13, Tmax 98.5, CO2 22, Vitamin C, Vit D, Zinc, Decadron IV, Lasix IV, accu checks with ISS, elevate INR 11, OAC on hold. PCP: Rodney Lambert MD  Readmission Risk Score: 16%    Patient Goals/Plan/Treatment Preferences: Met with Rm Lee this am; he currently lives home with spouse Gabriella Claudio. He has PCP, uses no DME pta, and prefers [facility] for home O2 needs. Transportation/Food Security/Housekeeping Addressed:  No issues identified.

## 2021-04-23 NOTE — PROGRESS NOTES
Pt had a nose bleed earlier this morning on his high flow oxygen. At 1345 pt was asking for a break from his high flow and he was worried about his nose. I placed the pt on 6LPM nasal cannula and started Acapella and I.S. with pt, he did well yielding an SPO2 of 91% on 6LPM nasal cannula; however at 1600 his nurse said he was starting to hover more around 89% on his SPO2, so pt placed back on high flow at 30LPM and 77% yielding an SPO2 of 91-92%.

## 2021-04-23 NOTE — FLOWSHEET NOTE
[facility] PROGRESS NOTE      Patient: Janie Dye  Room #: 3A-01/001-A            YOB: 1945  Age: 77 y.o. Gender: male            Admit Date & Time: 4/22/2021  7:43 PM    Assessment:  Westley\" as he likes to be called is a patient on the coved unit of . He has high flow oxygen. This  called his phone in his room and he answered. Despite difficulty breathing he sounded like he is in good spirits. Interventions: He belongs to [facility] and his [employer] family is praying for him. Prayers and words of encouragement and support given. Outcomes:  Encouraged    Plan: 1. Care Plan:  Continue spiritual and emotional care for patient and family. Including prayers.      Electronically signed by Nicole Myles on 4/23/2021 at 9:36 AM.  [facility]  [phone]

## 2021-04-23 NOTE — ED NOTES
Patient sitting on the cot. Lights turned down for comfort. Wife states she is going home and updated on POC. Call light in reach.      Jessy Omer RN  04/22/21 9837

## 2021-04-24 NOTE — PROGRESS NOTES
Wife, Sumi Petty, called and was updated on patient status and plan of care. Patient family acknowledges plan of care and patient status. No questions at this time.

## 2021-04-24 NOTE — PROGRESS NOTES
Hospitalist Progress Note    Patient:  Joaquin You      Unit/Bed:3A-01/001-A    YOB: 1945    MRN: [id number]       Acct: [de-identified]     PCP: Lou Machado MD    Date of Admission: 4/22/2021    Active Hospital Problems    Diagnosis Date Noted    COVID-19 [U07.1] 04/22/2021     Assessment/Plan:    1. Acute hypoxic respiratory failure 2/2 COVID-19 PNA with Superimposed Pulmonary Edema: Initially on 5L NC, now on HFNC. Cont PO Decadron, will give Taclozumab. Cont Vit D, C, Zinc, Pepcid, Flonase. One time dose of IV Lasix 40. Prophylactic lovenox deferred (supratherapeutic INR on coumadin). Pulmonary hygiene. Supportive care. --4/24: pt on HFNC 80 FiO2 and 30 L/Min. CXR today showing worsening infiltrates/edema. Pt received Talcozumab, start on IV lasix 40 qd. Cont PO Decadron. Vit D, C, Zinc, Pepcid, Flonase. IS and Acapella. 2. AST elevation: Mild. Consistent with COVID-19 infection. Trend daily.     3. NIDDM2, with hyperglycemia: A1C 7.0 in 1/2021. Metformin held. SSI initiated and Lantus 20U started. (target IP -180). POCT BG checks. Hypoglycemia protocol. Carb control diet.     4. S/p mechanical AVR (anticoagulated on coumadin): INR 12.1 on arrival (target 2.5-3.5). No active bleeding assessed. Lower-dose (2 mg IV) vitamin K administered given indication for coumadin. PT/INR daily. Pharmacy to dose warfarin when resumed. Monitor clinically for signs/symptoms of bleeding. Stress ulcer prophylaxis. --4/24: INR 2.11 today. Will start on heparin drip. Pharmacy to dose coumadin.            5. Primary HTN:  HCTZ, BB, ARB resumed. Monitor BP.     6. CAD / HLD: Daily ASA, statin resumed (AST < 3x upper limit of normal).     7. COPD (patient-reported): Without exacerbation. Never smoker. Pulmonary hygiene as above.     8. Complete heart block (s/p BIV): Noted. A-V paced on EKG.     9.  Acute on Chronic systolic CHF with recovered EF (50-55% per TTE 11/2019 / s/p AICD): IV (s/p mechanical AVR). Abdomen: Soft / non-tender / non-distended. BS present. Musculoskeletal: No cyanosis. 1+ pitting edema to BLE. Skin: Warm / dry. No pallor / diaphoresis. Neurologic: A/O x 3. Speech normal. Answers questions appropriately. No obvious focal neurologic deficits. Psychiatric: Thought content / judgment / insight appear appropriate. Peripheral pulses: Normal bilaterally. Labs:   Recent Labs     04/24/21  0458   WBC 8.4   HGB 15.7   HCT 46.2        Recent Labs     04/24/21  0458      K 3.8      CO2 21*   BUN 30*   CREATININE 0.7   CALCIUM 9.3   PHOS 2.9     Recent Labs     04/24/21  0458   AST 61*   ALT 82*   BILITOT 1.1   ALKPHOS 59     Recent Labs     04/24/21  0458   INR 2.11*     Recent Labs     04/23/21  0320   CKTOTAL 107       Urinalysis:      Lab Results   Component Value Date    NITRU NEGATIVE 11/01/2018    BLOODU NEGATIVE 11/01/2018    GLUCOSEU NEGATIVE 11/01/2018       Radiology:   All imaging reviewed     Diet: DIET GENERAL; Carb Control: 3 carb choices (45 gms)/meal; Low Sodium (2 GM)  Dietary Nutrition Supplements: Low Calorie High Protein Supplement      Code Status: Full Code            Electronically signed by Willy Harper MD on 4/24/2021 at 10:24 AM

## 2021-04-24 NOTE — PROGRESS NOTES
Wife, Francisco Javier, called and was updated on patient status and plan of care this a.m. Patient family acknowledges plan of care and patient status. No questions at this time.

## 2021-04-24 NOTE — FLOWSHEET NOTE
04/23/21 2105   Provider Notification   Reason for Communication Evaluate   Provider Name parth   Provider Notification Physician   Method of Communication Secure Message   Response See orders   Notification Time 2105     Notified of patient c/o poor sleep over the past few days and requesting sleep aid. Order received for melatonin 3mg PO PRN nightly.

## 2021-04-24 NOTE — PLAN OF CARE
Problem: Airway Clearance - Ineffective  Goal: Achieve or maintain patent airway  Outcome: Met This Shift  Note: Patient maintained patent airway this shift. Lung sounds clear/diminshed. Patient on heated high flow oxygen. Able to cough and clear airway effectively. O2 saturation above 90% on heated high flow. Problem: Gas Exchange - Impaired  Goal: Absence of hypoxia  Outcome: Met This Shift  Note: Dyspnea with exertion. Patient on heated high flow oxygen with O2 saturation above 90%. No apnea noted so far this shift. No hypoxia so far this shift. Problem: Body Temperature -  Risk of, Imbalanced  Goal: Ability to maintain a body temperature within defined limits  Outcome: Met This Shift  Note: Afebrile so far this shift. Problem: Isolation Precautions - Risk of Spread of Infection  Goal: Prevent transmission of infection  Outcome: Met This Shift  Note: Isolation precautions maintained this shift. Patient verbalized understanding of isolation precautions. Droplet plus precautions. Problem: Risk for Fluid Volume Deficit  Goal: Maintain normal heart rhythm  Outcome: Met This Shift  Note: NSR on continuous telemetry. S1. S2. VSS. Problem: Risk for Fluid Volume Deficit  Goal: Maintain normal serum potassium, sodium, calcium, phosphorus, and pH  Outcome: Met This Shift  Note: Daily lab work monitoring. electrolytes WNL today. Problem: Falls - Risk of:  Goal: Will remain free from falls  Description: Will remain free from falls  Outcome: Met This Shift  Note: Patient remained free from falls this shift. Used call light appropriately. Wore nonskid socks when ambulating with assistance. Bed alarm on. Call light in reach. Problem: Falls - Risk of:  Goal: Absence of physical injury  Description: Absence of physical injury  Outcome: Met This Shift  Note: Patient remained free from physical injury this shift. Used call light appropriately.  Wore nonskid socks when ambulating with assistance. Bed alarm on. Call light in reach. Pathway clear. Problem: Falls - Risk of:  Goal: Absence of physical injury  Description: Absence of physical injury  Outcome: Met This Shift  Note: Patient remained free from physical injury this shift. Used call light appropriately. Wore nonskid socks when ambulating with assistance. Bed alarm on. Call light in reach. Pathway clear. Problem: Breathing Pattern - Ineffective  Goal: Ability to achieve and maintain a regular respiratory rate  Outcome: Ongoing  Note: Patient with dyspnea with movement of talking. Tachypnea. Lung sounds clear/diminished. On heated high flow oxygen with O2 saturation above 90%. Cough present. Problem: Body Temperature -  Risk of, Imbalanced  Goal: Complications related to the disease process, condition or treatment will be avoided or minimized  Outcome: Ongoing     Problem: Nutrition Deficits  Goal: Optimize nutritional status  4/23/2021 2303 by Radu Robert RN  Outcome: Ongoing  Note: Patient ate 25% of dinner. Encouraging PO meals. Patient with loss of appetite. Will continue to monitor oral intake. Problem: Discharge Planning:  Goal: Discharged to appropriate level of care  Description: Discharged to appropriate level of care  Outcome: Ongoing  Note: Patient from home with wife. Plans to return home with wife at discharge. Patient and patient's wife updated on plan of care. Patient and wife verbalized understanding of plan of care. Will continue to monitor for discharge needs. Patient and wife participated in plan of care and contributed to goal setting.

## 2021-04-25 NOTE — PLAN OF CARE
Problem: Loneliness or Risk for Loneliness  Goal: Demonstrate positive use of time alone when socialization is not possible  Outcome: Ongoing  Note: Encouraged patient to make sure he gives his wife a phone call before he goes to bed. Stayed in patient's room and spent time talking about his hobbies and things he enjoys. Problem: Falls - Risk of:  Goal: Will remain free from falls  Description: Will remain free from falls  Outcome: Ongoing  Note: Bedside table and call light within reach. Chair alarm on. Gripper socks on. Patient educated on fall risk precautions and verbalizes understanding to call RN or STNA when patient needed to get up from chair.

## 2021-04-25 NOTE — PROGRESS NOTES
Clinical Pharmacy Note    Celia Calix is a 77 y.o. male for whom pharmacy has been asked to manage warfarin therapy. [facility] Ad[facility] Physician: Dr Goldie Valdes  Warfarin dose prior to admission: 6mg MWF 5mg all other days  Warfarin indication: mechanical valve  Target INR range: 2.5-3.5   Outpatient warfarin provider: Dr Jeni Talley    Past Medical History:   Diagnosis Date    CAD (coronary artery disease)     Clotting disorder ([facility] [address]     anemic    Compartment syndrome ([facility] [address]     Right arm    Heart murmur     Hyperlipidemia     Hypertension     Ventricular hypertrophy     left              Recent Labs     04/25/21  0706   INR 2.01*     Recent Labs     04/24/21  0458 04/24/21  1024 04/25/21  0347   HGB 15.7 15.4 15.5   HCT 46.2 45.5 44.8    215 246     Current warfarin drug-drug interactions: Allopurinol (HM), Ascorbic Acid, Aspirin (HM), Dexamethasone, heparin drip    Date INR Warfarin Dose   4/23/2021  2mg VItamin K IV   4/25/2021                                                                                                                                                                                                   2.01 6 mg                              Daily PT/INR until stable within therapeutic range. Thank you for the consult.

## 2021-04-25 NOTE — PLAN OF CARE
Practiced deep breathing exercises, walked in room 20 ft, O2 came back up fast, denies pain, a little unsteady on feet.

## 2021-04-25 NOTE — PROGRESS NOTES
Hospitalist Progress Note    Patient:  Lorelei Prado      Unit/Bed:3A-01/001-A    YOB: 1945    MRN: [id number]       Acct: [de-identified]     PCP: Therese Chavez MD    Date of Admission: 4/22/2021    Active Hospital Problems    Diagnosis Date Noted    COVID-19 [U07.1] 04/22/2021     Assessment/Plan:    1. Acute hypoxic respiratory failure 2/2 COVID-19 PNA with Superimposed Pulmonary Edema: Initially on 5L NC, now on HFNC. Cont PO Decadron, will give Taclozumab. Cont Vit D, C, Zinc, Pepcid, Flonase. One time dose of IV Lasix 40. Prophylactic lovenox deferred (supratherapeutic INR on coumadin). Pulmonary hygiene. Supportive care. --4/24: pt on HFNC 80 FiO2 and 30 L/Min. CXR today showing worsening infiltrates/edema. Pt received Talcozumab, start on IV lasix 40 qd. Cont PO Decadron. Vit D, C, Zinc, Pepcid, Flonase. IS and Acapella. --4/25: requiring more HFNC today, up to 85 FiO2 and 60 L/Min. Cont IV Laix, PO Decadron. Vit D, C, Zinc, Pepcid, Flonase. IS and Acapella. 2. AST elevation: Mild. Consistent with COVID-19 infection. Trend daily.     3. NIDDM2, with hyperglycemia: A1C 7.0 in 1/2021. Metformin held. SSI initiated and Lantus 20U started. (target IP -180). POCT BG checks. Hypoglycemia protocol. Carb control diet.     4. S/p mechanical AVR (anticoagulated on coumadin): INR 12.1 on arrival (target 2.5-3.5). No active bleeding assessed. Lower-dose (2 mg IV) vitamin K administered given indication for coumadin. PT/INR daily. Pharmacy to dose warfarin when resumed. Monitor clinically for signs/symptoms of bleeding. Stress ulcer prophylaxis. --4/24: INR 2.11 today. Will start on heparin drip. Pharmacy to dose coumadin. --4/25: INR 2.01 today, will resume Coumadin today. Cont Heparin drip until INR is 2.5.          5. Primary HTN:  HCTZ, BB, ARB resumed. Monitor BP.     6. CAD / HLD: Daily ASA, statin resumed (AST < 3x upper limit of normal).     7.  COPD (patient-reported): Without exacerbation. Never smoker. Pulmonary hygiene as above.     8. Complete heart block (s/p BIV): Noted. A-V paced on EKG.     9. Acute on Chronic systolic CHF with recovered EF (50-55% per TTE 11/2019 / s/p AICD): IV Lasix 40 qd. BB. Hold HCTZ and Cozaar. No outpatient loop diuretics listed. Monitor I/O, daily weights.     10. Gout: Without exacerbation. Allopurinol resumed. Chief Complaint: shortness of breath    Hospital Course: 76 y/o M PMHx HTN, HLD, CAD, NIDDM2, ? COPD (never-smoker), chronic systolic CHF with recovered EF (50-55% per TTE 11/2019 / s/p AICD), complete heart block (s/p BIV) and mechanical AVR (anticoagulated on coumadin) -- presents to [address] [place] [address] with a chief complaint of shortness of breath. History obtained from the patient.     Patient tested positive for COVID approximately 10 days ago / has experienced worsening shortness of breath and fatigue since diagnosis / + decreased oral intake, watery diarrhea for the past few days. Evaluated recently in the ED (4/17) / no hypoxia / discharged on decadron/doxycycline with continued progression of symptoms. Denies fever, emesis, chest pain, palpitations, abdominal pain or urinary symptoms. SpO2 at home: 82% on RA / decided to proceed to the ED for evaluation. Denies sick contacts.     ED course: afebrile, BP/HR nml, tachypnic (20s) with SpO2 83% on RA > 94% on 5L NC. Workup remarkable for: , BUN 28 (Cr 0.8), AST 47, CRP 6. 12. CBC nml. Lytes nml. Procal neg. BNP nml. Trop neg. D-Dimer nml. EKG: AV dual-paced with occasional PVCs. Portable CXR: CHF with increased central pulmonary vascular congestion and mild interstitial edema. Received 500 cc IVF bolus, duoneb in the ED. Hospitalist service contacted for admission. Please see A&P for additional details. Subjective: no acute events overnight. Pt sitting up in chair doing well. Denies any fevers, chills, sob or cough. Eating better good.  Pt reports being constipated. Medications:  Reviewed    Infusion Medications    heparin (PORCINE) Infusion 16 Units/kg/hr (04/25/21 0846)    sodium chloride      dextrose       Scheduled Medications    [START ON 4/26/2021] insulin glargine  35 Units Subcutaneous QAM AC    warfarin (COUMADIN) daily dosing (placeholder)   Other RX Placeholder    furosemide  40 mg Intravenous Daily    allopurinol  300 mg Oral Daily    aspirin  81 mg Oral Daily    atorvastatin  80 mg Oral Nightly    [Held by provider] hydroCHLOROthiazide  25 mg Oral QAM    [Held by provider] losartan  25 mg Oral Daily    metoprolol tartrate  50 mg Oral BID    sodium chloride flush  10 mL Intravenous 2 times per day    glycopyrrolate-formoterol  2 puff Inhalation BID    insulin lispro  0-12 Units Subcutaneous TID WC    insulin lispro  0-6 Units Subcutaneous Nightly    ascorbic acid  1,000 mg Oral Daily    zinc sulfate  50 mg Oral Daily    fluticasone  1 spray Each Nostril Daily    famotidine (PEPCID) injection  20 mg Intravenous BID    Vitamin D  1,000 Units Oral Daily    dexamethasone  6 mg Oral Daily     PRN Meds: polyethylene glycol, heparin (porcine), heparin (porcine), ipratropium-albuterol, sodium chloride flush, sodium chloride, promethazine **OR** ondansetron, acetaminophen **OR** acetaminophen, glucose, dextrose, glucagon (rDNA), dextrose, melatonin      Intake/Output Summary (Last 24 hours) at 4/25/2021 1139  Last data filed at 4/25/2021 1139  Gross per 24 hour   Intake 649.81 ml   Output 1645 ml   Net -995.19 ml       Diet:  DIET GENERAL; Carb Control: 3 carb choices (45 gms)/meal; Low Sodium (2 GM)  Dietary Nutrition Supplements: Low Calorie High Protein Supplement    Exam:  BP (!) 126/55   Pulse 64   Temp 98 °F (36.7 °C) (Oral)   Resp 20   Ht 5' 8\" (1.727 m)   Wt 198 lb 12.8 oz (90.2 kg)   SpO2 95%   BMI 30.23 kg/m²     General appearance: Lethargic-appearing  male. Pleasant. Cooperative. NAD.  On HFNC  HEENT: Normocephalic / atraumatic. Conjunctivae appear normal.  Neck: Supple. No JVD. Respiratory: Unlabored on 5L NC. Lung sounds diminished and coarse to auscultation bilaterally. No rales / rhonchi. No conversational dyspnea / accessory muscle use. Cardiovascular: RRR. Normal S1/S2. No murmurs / rubs / gallops. + Click (s/p mechanical AVR). Abdomen: Soft / non-tender / non-distended. BS present. Musculoskeletal: No cyanosis. 1+ pitting edema to BLE. Skin: Warm / dry. No pallor / diaphoresis. Neurologic: A/O x 3. Speech normal. Answers questions appropriately. No obvious focal neurologic deficits. Psychiatric: Thought content / judgment / insight appear appropriate. Peripheral pulses: Normal bilaterally. Labs:   Recent Labs     04/25/21  0347   WBC 10.4   HGB 15.5   HCT 44.8        Recent Labs     04/25/21  0347      K 3.6      CO2 22*   BUN 38*   CREATININE 0.7   CALCIUM 9.2   PHOS 3.8     Recent Labs     04/25/21  0347   AST 82*   *   BILITOT 0.9   ALKPHOS 61     Recent Labs     04/25/21  0706   INR 2.01*     Recent Labs     04/23/21  0320   CKTOTAL 107       Urinalysis:      Lab Results   Component Value Date    NITRU NEGATIVE 11/01/2018    BLOODU NEGATIVE 11/01/2018    GLUCOSEU NEGATIVE 11/01/2018       Radiology:   All imaging reviewed     Diet: DIET GENERAL; Carb Control: 3 carb choices (45 gms)/meal; Low Sodium (2 GM)  Dietary Nutrition Supplements: Low Calorie High Protein Supplement      Code Status: Full Code            Electronically signed by Daly Cochran MD on 4/25/2021 at 11:39 AM

## 2021-04-26 NOTE — CARE COORDINATION
4/26/21, 1:18 PM EDT    DISCHARGE ON GOING [facility] day: 4  Location: 3A-01/001-A Reason for admit: COVID-19 [U07.1]   Procedure: CXR:  CHF with increased central pulmonary vascular congestion and mild   interstitial edema. Barriers to Discharge: On HHF 60L and 80% FIO2. Sat 95%. PO Dexamethasone. Heparin gtt. Lasix. PCP: Gladis Pierson MD  Readmission Risk Score: 22%  Patient Goals/Plan/Treatment Preferences: Plans return home with spouse. Prefers [facility] for home O2 needs.

## 2021-04-26 NOTE — FLOWSHEET NOTE
[address] with patient's wife Aileen Chery regarding visitation policy for Covid patients. Discussed policy and gave her the option for Zoom call. She denied at this time but will be in touch if she changed her mind.

## 2021-04-26 NOTE — PROGRESS NOTES
Clinical Pharmacy Note    Warfarin consult follow-up    Recent Labs     04/26/21  0331   INR 2.06*     Recent Labs     04/24/21  1024 04/25/21  0347 04/26/21  0331   HGB 15.4 15.5 15.9   HCT 45.5 44.8 46.3    246 290       Significant Drug-Drug Interactions:  New warfarin drug-drug interactions: none  Discontinued drug-drug interactions: none  Current warfarin drug-drug interactions: allopurinol, aspirin, dexamethasone    Date INR Warfarin Dose   4/23/2021 12.13 2 mg Vitamin K IV   4/24/2021 2.11 No  warfarin   4/25/2021  Warf restart 2.01 6 mg   4/26/2021 2.06 5 mg                    Notes:                   Daily PT/INR until stable within therapeutic range.

## 2021-04-26 NOTE — PROGRESS NOTES
Pt desatted this morning when he used the bedside commode. Pt was dyspneic, O2 in the 70's and took a while to get back up to the 80's so HHF was cranked up to 100% FIO2 and 60L. Pt was able to get oxygen sat back up to 92-94% but was still SOB getting back to the recliner from the commode. RT was able to wean him down to 80% FIO2 at this time. Stayed with patient while up on commode. Laxative given for c/o constipation. Call light within reach. Will cont to monitor. 1910- Updated pt's wife Deanne Monsalve of pt's condition and plan of care. All questions and concerns addressed at this time.

## 2021-04-26 NOTE — PLAN OF CARE
Problem: Airway Clearance - Ineffective  Goal: Achieve or maintain patent airway  4/26/2021 0737 by Jonny Hurtado RN  Outcome: Ongoing  4/25/2021 1816 by Sharyle Hawthorn, RN  Outcome: Ongoing     Problem: Gas Exchange - Impaired  Goal: Absence of hypoxia  4/26/2021 0737 by Jonny Hurtado RN  Outcome: Ongoing  4/25/2021 1816 by Sharyle Hawthorn, RN  Outcome: Ongoing  Goal: Promote optimal lung function  4/26/2021 0737 by Jonny Hurtado RN  Outcome: Ongoing  4/25/2021 1816 by Sharyle Hawthorn, RN  Outcome: Ongoing     Problem: Body Temperature -  Risk of, Imbalanced  Goal: Ability to maintain a body temperature within defined limits  4/26/2021 0737 by Jonny Hurtado RN  Outcome: Ongoing  4/25/2021 1816 by Sharyle Hawthorn, RN  Outcome: Ongoing  Goal: Complications related to the disease process, condition or treatment will be avoided or minimized  4/26/2021 0737 by Jonny Hurtado RN  Outcome: Ongoing  4/25/2021 1816 by Sharyle Hawthorn, RN  Outcome: Ongoing     Problem: Isolation Precautions - Risk of Spread of Infection  Goal: Prevent transmission of infection  4/26/2021 0737 by Jonny Hurtado RN  Outcome: Ongoing  4/25/2021 1816 by Sharyle Hawthorn, RN  Outcome: Ongoing     Problem: Risk for Fluid Volume Deficit  Goal: Maintain normal heart rhythm  4/26/2021 0737 by Jonny Hurtado RN  Outcome: Ongoing  4/25/2021 1816 by Sharyle Hawthorn, RN  Outcome: Ongoing  Goal: Maintain absence of muscle cramping  4/26/2021 0737 by Jonny Hurtado RN  Outcome: Ongoing  4/25/2021 1816 by Sharyle Hawthorn, RN  Outcome: Ongoing  Goal: Maintain normal serum potassium, sodium, calcium, phosphorus, and pH  4/26/2021 0737 by Jonny Hurtado RN  Outcome: Ongoing  4/25/2021 1816 by Sharyle Hawthorn, RN  Outcome: Ongoing     Problem: Fatigue  Goal: Verbalize increase energy and improved vitality  4/26/2021 0737 by Jonny Hurtado RN  Outcome: Ongoing  4/25/2021 1816 by Sharyle Hawthorn, RN  Outcome: Ongoing   . [facility] Care plan reviewed with patient . Patient verbalize understanding of the plan of care and contribute to goal setting.

## 2021-04-26 NOTE — PROGRESS NOTES
Hospitalist Progress Note    Patient:  Larry Apt      Unit/Bed:3A-01/001-A    YOB: 1945    MRN: [id number]       Acct: [de-identified]     PCP: Nik Newell MD    Date of Admission: 4/22/2021    Active Hospital Problems    Diagnosis Date Noted    COVID-19 [U07.1] 04/22/2021     Assessment/Plan:    1. Acute hypoxic respiratory failure 2/2 COVID-19 PNA with Superimposed Pulmonary Edema: Initially on 5L NC, now on HFNC. Cont PO Decadron, will give Taclozumab. Cont Vit D, C, Zinc, Pepcid, Flonase. One time dose of IV Lasix 40. Prophylactic lovenox deferred (supratherapeutic INR on coumadin). Pulmonary hygiene. Supportive care. --4/24: pt on HFNC 80 FiO2 and 30 L/Min. CXR today showing worsening infiltrates/edema. Pt received Talcozumab, start on IV lasix 40 qd. Cont PO Decadron. Vit D, C, Zinc, Pepcid, Flonase. IS and Acapella. --4/25: requiring more HFNC today, up to 85 FiO2 and 60 L/Min. Cont IV Laix, PO Decadron. Vit D, C, Zinc, Pepcid, Flonase. IS and Acapella. --4/26: still on HFNC, FiO2 75 and 60 L/Min. Getting CXR today. CCM. 2. AST elevation: Mild. Consistent with COVID-19 infection. Trend daily.     3. NIDDM2, with hyperglycemia: A1C 7.0 in 1/2021. Metformin held. SSI initiated and Lantus 20U started. (target IP -180). POCT BG checks. Hypoglycemia protocol. Carb control diet.     4. S/p mechanical AVR (anticoagulated on coumadin): INR 12.1 on arrival (target 2.5-3.5). No active bleeding assessed. Lower-dose (2 mg IV) vitamin K administered given indication for coumadin. PT/INR daily. Pharmacy to dose warfarin when resumed. Monitor clinically for signs/symptoms of bleeding. Stress ulcer prophylaxis. --4/24: INR 2.11 today. Will start on heparin drip. Pharmacy to dose coumadin. --4/25: INR 2.01 today, will resume Coumadin today. Cont Heparin drip until INR is 2.5.   --4/26: INR 2.06.  Cont Coumadin and Heparin drip.         5. Primary HTN:  HCTZ, BB, ARB resumed. Monitor BP.     6. CAD / HLD: Daily ASA, statin resumed (AST < 3x upper limit of normal).     7. COPD (patient-reported): Without exacerbation. Never smoker. Pulmonary hygiene as above.     8. Complete heart block (s/p BIV): Noted. A-V paced on EKG.     9. Acute on Chronic systolic CHF with recovered EF (50-55% per TTE 11/2019 / s/p AICD): IV Lasix 40 qd. BB. Hold HCTZ and Cozaar. No outpatient loop diuretics listed. Monitor I/O, daily weights.     10. Gout: Without exacerbation. Allopurinol resumed. Chief Complaint: shortness of breath    Hospital Course: 76 y/o M PMHx HTN, HLD, CAD, NIDDM2, ? COPD (never-smoker), chronic systolic CHF with recovered EF (50-55% per TTE 11/2019 / s/p AICD), complete heart block (s/p BIV) and mechanical AVR (anticoagulated on coumadin) -- presents to [facility] with a chief complaint of shortness of breath. History obtained from the patient.     Patient tested positive for COVID approximately 10 days ago / has experienced worsening shortness of breath and fatigue since diagnosis / + decreased oral intake, watery diarrhea for the past few days. Evaluated recently in the ED (4/17) / no hypoxia / discharged on decadron/doxycycline with continued progression of symptoms. Denies fever, emesis, chest pain, palpitations, abdominal pain or urinary symptoms. SpO2 at home: 82% on RA / decided to proceed to the ED for evaluation. Denies sick contacts.     ED course: afebrile, BP/HR nml, tachypnic (20s) with SpO2 83% on RA > 94% on 5L NC. Workup remarkable for: , BUN 28 (Cr 0.8), AST 47, CRP 6. 12. CBC nml. Lytes nml. Procal neg. BNP nml. Trop neg. D-Dimer nml. EKG: AV dual-paced with occasional PVCs. Portable CXR: CHF with increased central pulmonary vascular congestion and mild interstitial edema. Received 500 cc IVF bolus, duoneb in the ED. Hospitalist service contacted for admission. Please see A&P for additional details. Subjective: no acute events overnight.  Pt sitting up in chair, frustrated and angry. Wants to go home. No fevers, chills, chest pain. Pt still coughing and sob.      Medications:  Reviewed    Infusion Medications    heparin (PORCINE) Infusion 14.738 Units/kg/hr (04/26/21 0926)    sodium chloride      dextrose       Scheduled Medications    insulin glargine  35 Units Subcutaneous QAM AC    warfarin (COUMADIN) daily dosing (placeholder)   Other RX Placeholder    furosemide  40 mg Intravenous Daily    allopurinol  300 mg Oral Daily    aspirin  81 mg Oral Daily    atorvastatin  80 mg Oral Nightly    [Held by provider] hydroCHLOROthiazide  25 mg Oral QAM    [Held by provider] losartan  25 mg Oral Daily    metoprolol tartrate  50 mg Oral BID    sodium chloride flush  10 mL Intravenous 2 times per day    glycopyrrolate-formoterol  2 puff Inhalation BID    insulin lispro  0-12 Units Subcutaneous TID WC    insulin lispro  0-6 Units Subcutaneous Nightly    ascorbic acid  1,000 mg Oral Daily    zinc sulfate  50 mg Oral Daily    fluticasone  1 spray Each Nostril Daily    famotidine (PEPCID) injection  20 mg Intravenous BID    Vitamin D  1,000 Units Oral Daily    dexamethasone  6 mg Oral Daily     PRN Meds: polyethylene glycol, heparin (porcine), heparin (porcine), ipratropium-albuterol, sodium chloride flush, sodium chloride, promethazine **OR** ondansetron, acetaminophen **OR** acetaminophen, glucose, dextrose, glucagon (rDNA), dextrose, melatonin      Intake/Output Summary (Last 24 hours) at 4/26/2021 0934  Last data filed at 4/26/2021 0933  Gross per 24 hour   Intake 403.84 ml   Output 1025 ml   Net -621.16 ml       Diet:  DIET GENERAL; Carb Control: 3 carb choices (45 gms)/meal; Low Sodium (2 GM)  Dietary Nutrition Supplements: Low Calorie High Protein Supplement    Exam:  /66   Pulse 67   Temp 98.6 °F (37 °C) (Oral)   Resp 24   Ht 5' 8\" (1.727 m)   Wt 198 lb 12.8 oz (90.2 kg)   SpO2 90%   BMI 30.23 kg/m²     General appearance: Lethargic-appearing  male. Pleasant. Cooperative. NAD. On HFNC  HEENT: Normocephalic / atraumatic. Conjunctivae appear normal.  Neck: Supple. No JVD. Respiratory: Lung sounds diminished and coarse to auscultation bilaterally. No rales / rhonchi. No conversational dyspnea / accessory muscle use. Cardiovascular: RRR. Normal S1/S2. No murmurs / rubs / gallops. + Click (s/p mechanical AVR). Abdomen: Soft / non-tender / non-distended. BS present. Musculoskeletal: No cyanosis. 1+ pitting edema to BLE. Skin: Warm / dry. No pallor / diaphoresis. Neurologic: A/O x 3. Speech normal. Answers questions appropriately. No obvious focal neurologic deficits. Psychiatric: Thought content / judgment / insight appear appropriate. Peripheral pulses: Normal bilaterally. Labs:   Recent Labs     04/26/21 0331   WBC 10.1   HGB 15.9   HCT 46.3        Recent Labs     04/26/21 0331      K 3.3*   CL 99   CO2 24   BUN 46*   CREATININE 0.8   CALCIUM 9.1   PHOS 4.0     Recent Labs     04/26/21 0331   AST 56*   *   BILITOT 1.0   ALKPHOS 59     Recent Labs     04/26/21 0331   INR 2.06*     No results for input(s): Zari Tapia in the last 72 hours. Urinalysis:      Lab Results   Component Value Date    NITRU NEGATIVE 11/01/2018    BLOODU NEGATIVE 11/01/2018    GLUCOSEU NEGATIVE 11/01/2018       Radiology:   All imaging reviewed     Diet: DIET GENERAL; Carb Control: 3 carb choices (45 gms)/meal; Low Sodium (2 GM)  Dietary Nutrition Supplements: Low Calorie High Protein Supplement      Code Status: Full Code            Electronically signed by Cole Wheeler MD on 4/26/2021 at 9:34 AM

## 2021-04-27 NOTE — PLAN OF CARE
Problem: Airway Clearance - Ineffective  Goal: Achieve or maintain patent airway  Outcome: Ongoing     Problem: Gas Exchange - Impaired  Goal: Absence of hypoxia  Outcome: Ongoing  Goal: Promote optimal lung function  Outcome: Ongoing     Problem: Breathing Pattern - Ineffective  Goal: Ability to achieve and maintain a regular respiratory rate  Outcome: Ongoing     Problem:  Body Temperature -  Risk of, Imbalanced  Goal: Ability to maintain a body temperature within defined limits  Outcome: Ongoing  Goal: Complications related to the disease process, condition or treatment will be avoided or minimized  Outcome: Ongoing     Problem: Isolation Precautions - Risk of Spread of Infection  Goal: Prevent transmission of infection  Outcome: Ongoing     Problem: Nutrition Deficits  Goal: Optimize nutritional status  4/27/2021 1625 by Zayda Amaro RN  Outcome: Ongoing  4/27/2021 1518 by Ciara Dias RD, LD  Outcome: Ongoing     Problem: Risk for Fluid Volume Deficit  Goal: Maintain normal heart rhythm  Outcome: Ongoing  Goal: Maintain absence of muscle cramping  Outcome: Ongoing  Goal: Maintain normal serum potassium, sodium, calcium, phosphorus, and pH  Outcome: Ongoing     Problem: Loneliness or Risk for Loneliness  Goal: Demonstrate positive use of time alone when socialization is not possible  Outcome: Ongoing     Problem: Fatigue  Goal: Verbalize increase energy and improved vitality  Outcome: Ongoing     Problem: Patient Education: Go to Patient Education Activity  Goal: Patient/Family Education  Outcome: Ongoing     Problem: Falls - Risk of:  Goal: Will remain free from falls  Outcome: Ongoing  Goal: Absence of physical injury  Outcome: Ongoing     Problem: Discharge Planning:  Goal: Discharged to appropriate level of care  Outcome: Ongoing

## 2021-04-27 NOTE — PROGRESS NOTES
Wife called and updated this morning. All questions were answered and wife was satisfied. Wife request vitamin A 9,000mcg be added to his med list as he takes this at home. Will refer this onto day shift.

## 2021-04-27 NOTE — FLOWSHEET NOTE
Pt was at the covid 19 unit dealing with covid. He was not giving up but was supported and encouraged. Prayer was appreciated. 04/27/21 1253   Encounter Summary   Services provided to: Patient   Referral/Consult From: Rounding   Continue Visiting Yes  (4/27 P)   Complexity of Encounter Low   Length of Encounter 15 minutes   Routine   Type Follow up   Assessment Approachable;Calm   Intervention [place];Prayer;Nurtured hope; Active listening;Empowerment;Sustaining presence/ Ministry of presence   Outcome Acceptance;Expressed gratitude;Encouraged; Hopeful;Receptive

## 2021-04-27 NOTE — PLAN OF CARE
Problem: Nutrition Deficits  Goal: Optimize nutritional status  Outcome: Ongoing   Nutrition Problem #1: Inadequate oral intake  Intervention: Food and/or Nutrient Delivery: Continue Current Diet, Modify Oral Nutrition Supplement, Vitamin Supplement, Mineral Supplement  Nutritional Goals: pt. to consume greater than 75% of meals and ONS during LOS

## 2021-04-27 NOTE — CARE COORDINATION
4/27/21, 1:51 PM EDT    DISCHARGE ON GOING [address] [facility] day: 5  Location: 3A-01/001-A Reason for admit: COVID-19 [U07.1]   Procedure: CXR:  CHF with increased central pulmonary vascular congestion and mild   interstitial edema. Barriers to Discharge: Afebrile. VSS. HHF 60L and 70% FIO2. Resp exercise. PO Decadron. Lasix. Vits and Zinc. PT/OT ordered but not yet seen. PCP: Therese Chavez MD  Readmission Risk Score: 21%  Patient Goals/Plan/Treatment Preferences:  Plans return home with spouse.  Prefers [facility] for home O2 needs.

## 2021-04-27 NOTE — PROGRESS NOTES
Patient wife was updated this afternoon and was informed that she may visit 30 minutes per day. She was very excited and came up to visit. Answered all question and concerns. She thank the nursing staff for that we do.

## 2021-04-27 NOTE — PROGRESS NOTES
Clinical Pharmacy Note    Warfarin consult follow-up    Recent Labs     04/27/21  0418   INR 2.85*     Recent Labs     04/25/21  0347 04/26/21  0331 04/27/21  0418   HGB 15.5 15.9 17.1   HCT 44.8 46.3 49.3    290 301       Significant Drug-Drug Interactions:  New warfarin drug-drug interactions: none  Discontinued drug-drug interactions: none  Current warfarin drug-drug interactions: allopurinol, aspirin, dexamethasone     Date INR Warfarin Dose   4/23/2021 12.13 2 mg Vitamin K IV   4/24/2021 2.11 No  warfarin   4/25/2021  Warf restart 2.01 6 mg   4/26/2021 2.06 5 mg    4/27/2021 2.85  2 mg                      Notes:                   Daily PT/INR until stable within therapeutic range.

## 2021-04-27 NOTE — PLAN OF CARE
Problem: Airway Clearance - Ineffective  Goal: Achieve or maintain patent airway  Outcome: Ongoing  Note: Pt maintains a patent airway during the night. Continue to encourage coughing/deep breathing. Problem: Gas Exchange - Impaired  Goal: Absence of hypoxia  Outcome: Ongoing  Note: Pt has hypoxia during the night. FiO2 increased high flow from 70% to 75%. Coarse crackles heard in left lower lobes. All other lobes are clear and diminished. Problem: Breathing Pattern - Ineffective  Goal: Ability to achieve and maintain a regular respiratory rate  Outcome: Ongoing  Note: Pt remains tachypenic between 24-30BPM. Continue to monitor. Problem: Body Temperature -  Risk of, Imbalanced  Goal: Ability to maintain a body temperature within defined limits  Outcome: Ongoing  Note: Temp remains within normal parameters during this shift. Problem: Isolation Precautions - Risk of Spread of Infection  Goal: Prevent transmission of infection  Outcome: Ongoing  Note: Continue droplet plus precautions due to covid. Problem: Nutrition Deficits  Goal: Optimize nutritional status  Outcome: Ongoing  Note: Pt has optimal nutrtitional intake during the day of care. Problem: Falls - Risk of:  Goal: Will remain free from falls  Description: Will remain free from falls  Outcome: Ongoing  Note: Pt remains free of falls during the night. Continue bed in the lowest position, items and call light placed within reach, side rails up x2, and bed alarm placed on. Continue hourly rounding. Problem: Discharge Planning:  Goal: Discharged to appropriate level of care  Description: Discharged to appropriate level of care  Outcome: Ongoing  Note: Pt will be D/C home with wife when medically stable. Care plan reviewed with patient. Patient verbalize understanding of the plan of care and contribute to goal setting.

## 2021-04-27 NOTE — PROGRESS NOTES
Hospitalist Progress Note    Patient:  Francis Nguyen      Unit/Bed:3A-01/001-A    YOB: 1945    MRN: [id number]       Acct: [de-identified]     PCP: Bonny Ramirez MD    Date of Admission: 4/22/2021    Active Hospital Problems    Diagnosis Date Noted    COVID-19 [U07.1] 04/22/2021     Assessment/Plan:    1. Acute hypoxic respiratory failure 2/2 COVID-19 PNA with Superimposed Pulmonary Edema: Initially on 5L NC, now on HFNC. Cont PO Decadron, will give Taclozumab. Cont Vit D, C, Zinc, Pepcid, Flonase. One time dose of IV Lasix 40. Prophylactic lovenox deferred (supratherapeutic INR on coumadin). Pulmonary hygiene. Supportive care. --4/24: pt on HFNC 80 FiO2 and 30 L/Min. CXR today showing worsening infiltrates/edema. Pt received Talcozumab, start on IV lasix 40 qd. Cont PO Decadron. Vit D, C, Zinc, Pepcid, Flonase. IS and Acapella. --4/25: requiring more HFNC today, up to 85 FiO2 and 60 L/Min. Cont IV Laix, PO Decadron. Vit D, C, Zinc, Pepcid, Flonase. IS and Acapella. --4/26: still on HFNC, FiO2 75 and 60 L/Min. Getting CXR today. CCM.   --4/27: on HFNC, FiO2 70 and 60 L/Min. CCM. Encourage IS and Acapella. 2. AST elevation: Mild. Consistent with COVID-19 infection. Trend daily.     3. NIDDM2, with hyperglycemia: A1C 7.0 in 1/2021. Metformin held. SSI initiated and Lantus 20U started. (target IP -180). POCT BG checks. Hypoglycemia protocol. Carb control diet.     4. S/p mechanical AVR (anticoagulated on coumadin): INR 12.1 on arrival (target 2.5-3.5). No active bleeding assessed. Lower-dose (2 mg IV) vitamin K administered given indication for coumadin. PT/INR daily. Pharmacy to dose warfarin when resumed. Monitor clinically for signs/symptoms of bleeding. Stress ulcer prophylaxis. --4/24: INR 2.11 today. Will start on heparin drip. Pharmacy to dose coumadin. --4/25: INR 2.01 today, will resume Coumadin today.  Cont Heparin drip until INR is 2.5.   --4/26: INR 2.06. Cont Coumadin and Heparin drip. --4/27: INR 2.85 today, will discontinue Heparin drip.           5. Primary HTN:  HCTZ, BB, ARB resumed. Monitor BP.     6. CAD / HLD: Daily ASA, statin resumed (AST < 3x upper limit of normal).     7. COPD (patient-reported): Without exacerbation. Never smoker. Pulmonary hygiene as above.     8. Complete heart block (s/p BIV): Noted. A-V paced on EKG.     9. Acute on Chronic systolic CHF with recovered EF (50-55% per TTE 11/2019 / s/p AICD): IV Lasix 40 qd. BB. Hold HCTZ and Cozaar. No outpatient loop diuretics listed. Monitor I/O, daily weights.     10. Gout: Without exacerbation. Allopurinol resumed. Chief Complaint: shortness of breath    Hospital Course: 78 y/o M PMHx HTN, HLD, CAD, NIDDM2, ? COPD (never-smoker), chronic systolic CHF with recovered EF (50-55% per TTE 11/2019 / s/p AICD), complete heart block (s/p BIV) and mechanical AVR (anticoagulated on coumadin) -- presents to [facility] with a chief complaint of shortness of breath. History obtained from the patient.     Patient tested positive for COVID approximately 10 days ago / has experienced worsening shortness of breath and fatigue since diagnosis / + decreased oral intake, watery diarrhea for the past few days. Evaluated recently in the ED (4/17) / no hypoxia / discharged on decadron/doxycycline with continued progression of symptoms. Denies fever, emesis, chest pain, palpitations, abdominal pain or urinary symptoms. SpO2 at home: 82% on RA / decided to proceed to the ED for evaluation. Denies sick contacts.     ED course: afebrile, BP/HR nml, tachypnic (20s) with SpO2 83% on RA > 94% on 5L NC. Workup remarkable for: , BUN 28 (Cr 0.8), AST 47, CRP 6. 12. CBC nml. Lytes nml. Procal neg. BNP nml. Trop neg. D-Dimer nml. EKG: AV dual-paced with occasional PVCs. Portable CXR: CHF with increased central pulmonary vascular congestion and mild interstitial edema. Received 500 cc IVF bolus, duoneb in the ED. Hospitalist service contacted for admission. Please see A&P for additional details. Subjective: no acute events overnight. Pt sitting up in chair, continues to be frustrated and just stating \"I want to go home\". No fevers, chills, chest pain. Pt still coughing and sob.      Medications:  Reviewed    Infusion Medications    heparin (PORCINE) Infusion Stopped (04/27/21 0544)    sodium chloride      dextrose       Scheduled Medications    insulin glargine  40 Units Subcutaneous QAM AC    famotidine  20 mg Oral BID    warfarin (COUMADIN) daily dosing (placeholder)   Other RX Placeholder    furosemide  40 mg Intravenous Daily    allopurinol  300 mg Oral Daily    aspirin  81 mg Oral Daily    atorvastatin  80 mg Oral Nightly    [Held by provider] hydroCHLOROthiazide  25 mg Oral QAM    [Held by provider] losartan  25 mg Oral Daily    metoprolol tartrate  50 mg Oral BID    sodium chloride flush  10 mL Intravenous 2 times per day    glycopyrrolate-formoterol  2 puff Inhalation BID    insulin lispro  0-12 Units Subcutaneous TID WC    insulin lispro  0-6 Units Subcutaneous Nightly    ascorbic acid  1,000 mg Oral Daily    zinc sulfate  50 mg Oral Daily    fluticasone  1 spray Each Nostril Daily    Vitamin D  1,000 Units Oral Daily    dexamethasone  6 mg Oral Daily     PRN Meds: potassium chloride **OR** potassium alternative oral replacement **OR** potassium chloride, polyethylene glycol, heparin (porcine), heparin (porcine), ipratropium-albuterol, sodium chloride flush, sodium chloride, promethazine **OR** ondansetron, acetaminophen **OR** acetaminophen, glucose, dextrose, glucagon (rDNA), dextrose, melatonin      Intake/Output Summary (Last 24 hours) at 4/27/2021 0931  Last data filed at 4/27/2021 0300  Gross per 24 hour   Intake 2125 ml   Output 1770 ml   Net 355 ml       Diet:  DIET GENERAL; Carb Control: 3 carb choices (45 gms)/meal; Low Sodium (2 GM)  Dietary Nutrition Supplements: Low Calorie High Protein Supplement    Exam:  BP (!) 121/90   Pulse 65   Temp 98.9 °F (37.2 °C) (Oral)   Resp 21   Ht 5' 8\" (1.727 m)   Wt 198 lb 12.8 oz (90.2 kg)   SpO2 92%   BMI 30.23 kg/m²     General appearance: Lethargic-appearing  male. Pleasant. Cooperative. NAD. On HFNC  HEENT: Normocephalic / atraumatic. Conjunctivae appear normal.  Neck: Supple. No JVD. Respiratory: Lung sounds diminished and coarse to auscultation bilaterally. No rales / rhonchi. No conversational dyspnea / accessory muscle use. Cardiovascular: RRR. Normal S1/S2. No murmurs / rubs / gallops. + Click (s/p mechanical AVR). Abdomen: Soft / non-tender / non-distended. BS present. Musculoskeletal: No cyanosis. 1+ pitting edema to BLE. Skin: Warm / dry. No pallor / diaphoresis. Neurologic: A/O x 3. Speech normal. Answers questions appropriately. No obvious focal neurologic deficits. Psychiatric: Thought content / judgment / insight appear appropriate. Peripheral pulses: Normal bilaterally. Labs:   Recent Labs     04/27/21 0418   WBC 10.6   HGB 17.1   HCT 49.3        Recent Labs     04/26/21  0331 04/27/21 0418    139   K 3.3* 4.5   CL 99 100   CO2 24 28   BUN 46* 45*   CREATININE 0.8 1.0   CALCIUM 9.1 9.6   PHOS 4.0  --      Recent Labs     04/27/21 0418   AST 44*   *   BILITOT 0.9   ALKPHOS 63     Recent Labs     04/27/21 0418   INR 2.85*     No results for input(s): CKTOTAL, TROPONINI in the last 72 hours. Urinalysis:      Lab Results   Component Value Date    NITRU NEGATIVE 11/01/2018    BLOODU NEGATIVE 11/01/2018    GLUCOSEU NEGATIVE 11/01/2018       Radiology:   All imaging reviewed     Diet: DIET GENERAL; Carb Control: 3 carb choices (45 gms)/meal; Low Sodium (2 GM)  Dietary Nutrition Supplements: Low Calorie High Protein Supplement      Code Status: Full Code            Electronically signed by Taj Bullock MD on 4/27/2021 at 9:31 AM

## 2021-04-27 NOTE — PROGRESS NOTES
9000mcg - see notes above      Wounds:  None       Current Nutrition Therapies:    DIET GENERAL; Carb Control: 3 carb choices (45 gms)/meal; Low Sodium (2 GM)  Dietary Nutrition Supplements: Frozen Oral Supplement TID    Anthropometric Measures:  · Height: 5' 8\" (172.7 cm)  · Current Body Weight: 198 lb (89.8 kg)(4/25)   · Admission Body Weight: 204 lb (92.5 kg)(4/23 with no edema)    · Usual Body Weight: 210 lb (95.3 kg)(per pt.; Epic wt. records of unknown source)   Ideal Body Weight: 154 lbs;    · BMI: 30.1  · BMI Categories: Obese Class 1 (BMI 30.0-34. 9)       Nutrition Diagnosis:   · Inadequate oral intake related to catabolic illness(covid) as evidenced by intake 0-25%, intake 26-50%      Nutrition Interventions:   Food and/or Nutrient Delivery:  Continue Current Diet, Modify Oral Nutrition Supplement, Vitamin Supplement, Mineral Supplement  Nutrition Education/Counseling:  Education initiated(4/23 and 4/27 encouraged continued low sodium diet as pt follows pta; yogurt for probiotics; discussed ONS use; pt. Familiar with ADA basics)   Coordination of Nutrition Care:  Continue to monitor while inpatient, Interdisciplinary Rounds    Goals:  pt. to consume greater than 75% of meals and ONS during LOS       Nutrition Monitoring and Evaluation:   Behavioral-Environmental Outcomes:  None Identified   Food/Nutrient Intake Outcomes:  Food and Nutrient Intake, Supplement Intake  Physical Signs/Symptoms Outcomes:  Biochemical Data, GI Status, Fluid Status or Edema, Hemodynamic Status, Skin, Weight     Discharge Planning:     Too soon to determine     Electronically signed by Sally Mercedes RD, LD on 4/27/21 at 3:19 PM EDT    Contact: [phone]

## 2021-04-28 NOTE — PLAN OF CARE
Problem: Airway Clearance - Ineffective  Goal: Achieve or maintain patent airway  4/28/2021 1249 by Joao Maynard RN  Outcome: Ongoing  4/27/2021 2359 by Tamra Mcmanus RN  Outcome: Ongoing  Note: Patient maintains patent airway this shift  Intervention: Airway management  Note: Patient remains on Bipap, able to tolerate high flow at max setting for short periods of time to eat. Problem: Gas Exchange - Impaired  Goal: Absence of hypoxia  4/28/2021 1249 by Joao Maynard RN  Outcome: Ongoing  Note: Continuous pulse ox monitored. 4/27/2021 2359 by Tamra Mcmanus RN  Outcome: Ongoing  Note: Patient's O2 sats remain >90% on 80% heated high flow  Goal: Promote optimal lung function  4/28/2021 1249 by Joao Maynard RN  Outcome: Ongoing  4/27/2021 2359 by Tamra Mcmanus RN  Outcome: Ongoing  Note: Patient encouraged to cough and deep breathe, use incentive spirometer and acapella     Problem: Breathing Pattern - Ineffective  Goal: Ability to achieve and maintain a regular respiratory rate  4/28/2021 1249 by Joao Maynard RN  Outcome: Ongoing  4/27/2021 2359 by Tamra Mcmanus RN  Outcome: Ongoing  Note: Respiration rate is monitored and recorded continuously      Problem:  Body Temperature -  Risk of, Imbalanced  Goal: Ability to maintain a body temperature within defined limits  4/28/2021 1249 by Joao Maynard RN  Outcome: Ongoing  4/27/2021 2359 by Tamra Mcmanus RN  Outcome: Ongoing  Note: Temperature WNL this shift  Goal: Complications related to the disease process, condition or treatment will be avoided or minimized  Outcome: Ongoing     Problem: Isolation Precautions - Risk of Spread of Infection  Goal: Prevent transmission of infection  4/28/2021 1249 by Joao Maynard RN  Outcome: Ongoing  4/27/2021 2359 by Tamra Mcmanus RN  Outcome: Ongoing  Note: Patient remains in isolation precautions to prevent spread of infection     Problem: Nutrition Deficits  Goal: Optimize nutritional status  4/28/2021 1249 by Elida Dorado RN  Outcome: Ongoing  Note: Encouraged patient to drink magic cup supplements  4/27/2021 2359 by Kunal Butcher RN  Outcome: Ongoing  Note: Patient encouraged to consume % of meals     Problem: Risk for Fluid Volume Deficit  Goal: Maintain normal heart rhythm  4/28/2021 1249 by Elida Dorado RN  Outcome: Ongoing  4/27/2021 2359 by Kunal Butcher RN  Outcome: Ongoing  Note: Patient maintains a normal heart rhythm this shift  Goal: Maintain absence of muscle cramping  4/28/2021 1249 by Elida Dorado RN  Outcome: Ongoing  4/27/2021 2359 by Kunal Butcher RN  Outcome: Ongoing  Note: No c/o muscle cramping this shift  Goal: Maintain normal serum potassium, sodium, calcium, phosphorus, and pH  4/28/2021 1249 by Elida Dorado RN  Outcome: Ongoing  4/27/2021 2359 by Kunal Butcher RN  Outcome: Ongoing  Note: Labs are monitored daily  Intervention: Monitor the effects of medications such as diuretics  Note: Patient receiving IV lasix, urine output measured     Problem: Loneliness or Risk for Loneliness  Goal: Demonstrate positive use of time alone when socialization is not possible  4/28/2021 1249 by Elida Dorado RN  Outcome: Ongoing  Note: Emotional support provided  4/27/2021 2359 by Kunal Butcher RN  Outcome: Ongoing  Note: Patient was able to visit with wife on day shift     Problem: Fatigue  Goal: Verbalize increase energy and improved vitality  4/28/2021 1249 by Elida Dorado RN  Outcome: Ongoing  4/27/2021 2359 by Kunal Butcher RN  Outcome: Ongoing  Note: Patient resting in chair this shift     Problem: Patient Education: Go to Patient Education Activity  Goal: Patient/Family Education  4/28/2021 1249 by Elida Dorado RN  Outcome: Ongoing  4/27/2021 2359 by Kunal Butcher RN  Outcome: Ongoing  Note: Patient educated on medications given     Problem: Falls - Risk of:  Goal: Will remain free from falls  Description: Will remain free from falls  4/28/2021 1249 by Ingrid Russo RN  Outcome: Ongoing  4/27/2021 2359 by Greer Terry RN  Outcome: Ongoing  Note: Patient free of falls this shift. Patient on falling star program. Fall band intact. RN visually checks on patient with hourly rounds. Patient tolerates ambulation each shift. Goal: Absence of physical injury  Description: Absence of physical injury  4/28/2021 1249 by Ingrid Russo RN  Outcome: Ongoing  4/27/2021 2359 by Greer Terry RN  Outcome: Ongoing  Note: Patient free from injury this shift     Problem: Discharge Planning:  Goal: Discharged to appropriate level of care  Description: Discharged to appropriate level of care  4/28/2021 1249 by Ingrid Russo RN  Outcome: Ongoing  4/27/2021 2359 by Greer Terry RN  Outcome: Ongoing  Note: Patient plans to return home at discharge.

## 2021-04-28 NOTE — PLAN OF CARE
Problem: Airway Clearance - Ineffective  Goal: Achieve or maintain patent airway  4/27/2021 2359 by James Contreras RN  Outcome: Ongoing  Note: Patient maintains patent airway this shift     Problem: Gas Exchange - Impaired  Goal: Absence of hypoxia  4/27/2021 2359 by James Contreras RN  Outcome: Ongoing  Note: Patient's O2 sats remain >90% on 80% heated high flow     Problem: Gas Exchange - Impaired  Goal: Promote optimal lung function  4/27/2021 2359 by James Contreras RN  Outcome: Ongoing  Note: Patient encouraged to cough and deep breathe, use incentive spirometer and acapella     Problem: Breathing Pattern - Ineffective  Goal: Ability to achieve and maintain a regular respiratory rate  4/27/2021 2359 by James Contreras RN  Outcome: Ongoing  Note: Respiration rate is monitored and recorded continuously      Problem:  Body Temperature -  Risk of, Imbalanced  Goal: Ability to maintain a body temperature within defined limits  4/27/2021 2359 by James Contreras RN  Outcome: Ongoing  Note: Temperature WNL this shift     Problem: Isolation Precautions - Risk of Spread of Infection  Goal: Prevent transmission of infection  4/27/2021 2359 by James Contreras RN  Outcome: Ongoing  Note: Patient remains in isolation precautions to prevent spread of infection     Problem: Nutrition Deficits  Goal: Optimize nutritional status  4/27/2021 2359 by James Contreras RN  Outcome: Ongoing  Note: Patient encouraged to consume % of meals     Problem: Risk for Fluid Volume Deficit  Goal: Maintain normal heart rhythm  4/27/2021 2359 by James Contreras RN  Outcome: Ongoing  Note: Patient maintains a normal heart rhythm this shift     Problem: Risk for Fluid Volume Deficit  Goal: Maintain absence of muscle cramping  4/27/2021 2359 by James Contreras RN  Outcome: Ongoing  Note: No c/o muscle cramping this shift     Problem: Risk for Fluid Volume Deficit  Goal: Maintain normal serum potassium, sodium, calcium, phosphorus, and pH  4/27/2021 2359 by Mateo Armenta RN  Outcome: Ongoing  Note: Labs are monitored daily     Problem: Loneliness or Risk for Loneliness  Goal: Demonstrate positive use of time alone when socialization is not possible  4/27/2021 2359 by Mateo Armenta RN  Outcome: Ongoing  Note: Patient was able to visit with wife on day shift     Problem: Fatigue  Goal: Verbalize increase energy and improved vitality  4/27/2021 2359 by Mateo Armenta RN  Outcome: Ongoing  Note: Patient resting in chair this shift     Problem: Patient Education: Go to Patient Education Activity  Goal: Patient/Family Education  4/27/2021 2359 by Mateo Armenta RN  Outcome: Ongoing  Note: Patient educated on medications given     Problem: Falls - Risk of:  Goal: Will remain free from falls  Description: Will remain free from falls  4/27/2021 2359 by Mateo Armenta RN  Outcome: Ongoing  Note: Patient free of falls this shift. Patient on falling star program. Fall band intact. RN visually checks on patient with hourly rounds. Patient tolerates ambulation each shift. Problem: Falls - Risk of:  Goal: Absence of physical injury  Description: Absence of physical injury  4/27/2021 2359 by Mateo Armenta RN  Outcome: Ongoing  Note: Patient free from injury this shift     Problem: Discharge Planning:  Goal: Discharged to appropriate level of care  Description: Discharged to appropriate level of care  4/27/2021 2359 by Mateo Armenta RN  Outcome: Ongoing  Note: Patient plans to return home at discharge. Care plan reviewed with patient and wife. Patient and wife verbalize understanding of the plan of care and contribute to goal setting.

## 2021-04-28 NOTE — PROGRESS NOTES
Clinical Pharmacy Note    Warfarin consult follow-up    Recent Labs     04/28/21  0526   INR 3.32*     Recent Labs     04/26/21  0331 04/27/21  0418 04/28/21  0526   HGB 15.9 17.1 17.5   HCT 46.3 49.3 51.7    301 300       Significant Drug-Drug Interactions:  New warfarin drug-drug interactions: none  Discontinued drug-drug interactions: heparin drip  Current warfarin drug-drug interactions: allopurinol, aspirin, dexamethasone     Date INR Warfarin Dose   4/23/2021 12.13 2 mg Vitamin K IV   4/24/2021 2.11 No  warfarin   4/25/2021  Warf restart 2.01 6 mg   4/26/2021 2.06 5 mg    4/27/2021 2.85  2 mg    4/28/2021  3.32  2 mg              Notes:                   Daily PT/INR until stable within therapeutic range.

## 2021-04-28 NOTE — PROGRESS NOTES
Lianet Casiano 61  INPATIENT OCCUPATIONAL THERAPY  [facility] CCU 3A  EVALUATION    Time:   Time In: 1520  Time Out: 1545  Timed Code Treatment Minutes: 10 Minutes  Minutes: 25          Date: 2021  Patient Name: Jolene Kaur,   Gender: male      MRN: [id number]  : 1945  (77 y.o.)  Referring Practitioner: Dr. Luz Maria Garcia MD  Diagnosis: COVID-19  Additional Pertinent Hx: Pt admitted with C/O feeling SOB. He tested positive for COVID approximately 10 days ago / has experienced worsening shortness of breath and fatigue since diagnosis / + decreased oral intake, watery diarrhea for the past few days. Evaluated recently in the ED () / no hypoxia / discharged on decadron/doxycycline with continued progression of symptoms. Denies fever, emesis, chest pain, palpitations, abdominal pain or urinary symptoms. SpO2 at home: 82% on RA / decided to proceed to the ED for evaluation. Restrictions/Precautions:  Restrictions/Precautions: Isolation  Position Activity Restriction  Other position/activity restrictions: Droplet plus isolation; BiPap assist with mask- HFNC during mealtime. Subjective  Chart Reviewed: Yes, Orders, History and Physical    Subjective: Pleasant and cooperative  Comments: RN approved session. Pt had been sitting in the chair for over 2 hours. He agreed to return to supine as therapist entered the room. He had his nurse also assisting with multiple line management. Pain:  Pain Assessment  Patient Currently in Pain: Denies    Vitals: Oxygen: Pt was using  BiPap at 95% FiO2 and shows O2 saturation in 92-95% range while getting up from chair and back into bed.     Social/Functional History:  Lives With: Spouse  Type of Home: House  Home Layout: Two level  Home Access: Stairs to enter with rails  Entrance Stairs - Number of Steps: 8 steps with B rails  Entrance Stairs - Rails: Both   Bathroom Shower/Tub: Walk-in shower  Bathroom Toilet: Standard  Bathroom Accessibility: Accessible    Receives Help From: Family  ADL Assistance: Independent  Homemaking Assistance: Independent  Homemaking Responsibilities: Yes  Ambulation Assistance: Independent  Transfer Assistance: Independent    Active : Yes  Occupation: Retired  Type of occupation: 5 year ago  [address]: 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 He did his own yardwork. He mows a 1/2 acre with a self propelled mower. VISION:WFL    HEARING:  WFL    COGNITION: WFL    RANGE OF MOTION:  Bilateral Upper Extremity:  WFL    STRENGTH:  Bilateral Upper Extremity:  Impaired - 3+/5 deltoid; 4-/5 pectoral; 4-/5 biceps and 4/5 triceps    SENSATION:   Numbness in his R hand from previous forearm injury and multiple surgeries    ADL:   No ADL's completed this session. Nisreen Brittle BALANCE:  Sitting Balance: SBA for dynamic sitting while scooting forward in the chair and preparing to stand. Standing Balance: Stand By Assistance. preparing to walk    BED MOBILITY:  Sit to Supine: Contact Guard Assistance using the bedrail  Scooting: Stand By Assistance coming up to the head of the bed while holding onto the bedrail at the edge of bed    TRANSFERS:  Sit to Stand:  Stand By Assistance. from the recliner chair  Stand to Sit: Stand By Assistance. to the edge of bed    FUNCTIONAL MOBILITY:  Assistive Device: None  Assist Level:  Stand By Assistance. Distance: 6 ft from chair to the bed  Even steps taken without LOB. Pt had help with his O2 line and with the telemetry and O2 monitor lines. Activity Tolerance:  Patient tolerance of  treatment: fair. Pt was keeping his O2 saturation above 92% throughout. He had been sitting in the chair for over 2 hours. He helped with getting repositioned in the bed with a short rest break taken after having walked to the bed.       Assessment:  Assessment: Patient would benefit from continued skilled OT services to address above deficits. He presents with COVID-19. He had noticed for 10 days prior to admission increased SOB. He started to have less oral intake and then had diarrhea prior to admission. Pt was independent with self care. He also helped with homemaking and did the yardwork prior to admission. He did not use any AD for ambulation. Pt demonstrated ambulating a short distance without any AD with SBA. He did not demonstrate self care at this time. He was using a BiPap at 95% FiO2 and had been able to eat his meals while wearing the HFNC earlier today. Performance deficits / Impairments: Decreased functional mobility , Decreased high-level IADLs, Decreased strength, Decreased endurance  Prognosis: Good  REQUIRES OT FOLLOW UP: Yes  Decision Making: Low Complexity    Treatment Initiated: Treatment and education initiated within context of evaluation. Evaluation time included review of current medical information, gathering information related to past medical, social and functional history, completion of standardized testing, formal and informal observation of tasks, assessment of data and development of plan of care and goals. Treatment time included skilled education and facilitation of tasks to increase safety and independence with ADL's for improved functional independence and quality of life. Discussed with pt his goal and also the process of getting prepared to return to his home. He wanted to be discharged soon and did understand that the process may take awhile to get to the point when he is ready to be discharged. Encouraged pt to practice with the incentive spirometer when he is placed on HFNC so that he can retrain himself to take slow deeper breaths. Pt verbalized understanding.       Discharge Recommendations:  Continue to assess pending progress    Patient Education:  OT Education: OT Role, Plan of Care  Patient Education: Process of being weaned from the amounts of O2 which he is requiring for functioning at the present time; proper way of using the incentive spirometer when he has his BiPap mask of and he is using HFNC. Equipment Recommendations:  Equipment Needed: No  Other: May benefit from having a shower chair    Plan:  Times per week: 5x  Current Treatment Recommendations: Endurance Training, Functional Mobility Training, Self-Care / ADL  Plan Comment: Pt would benefit from continued skilled OT services when medically stable and discharged from Acute. HHOT recommended. Specific instructions for Next Treatment: Functional mobility; standing ADLs; UE exercises and endurance training. See long-term goal time frame for expected duration of plan of care. If no long-term goals established, a short length of stay is anticipated. Goals:  Patient goals : \"Get moving arouind more and getting strength back so I can return home as soon as possible. \" pt states. Short term goals  Time Frame for Short term goals: By discharge  Short term goal 1: Pt will demonstrate functional mobility walking in his room with supervison while keeping O2 saturation > than 90% to prepare for doing sinkside grooming or toileting routine. Short term goal 2: Pt will complete simple standing ADLs for over 5 mintues with SBA while keeping his O2 saturation > than 90% with cues as needed to increase his endurance and activity tolerance for self care and homekaking tasks. Short term goal 3: Pt will complete BUE moderate resistance exercises while following demonstration for proper breathing technique to increase his endurance and strength for ease of doing self care and homemaking. Short term goal 4: Pt will complete lower body dressing or bathing while keeping a steady pace and taking rest breaks as needed to increase his activity tolerance for ease of doing light homeaking or yardwork.          Following session, patient left in safe position with all fall risk precautions in place.

## 2021-04-29 NOTE — PLAN OF CARE
Problem: Airway Clearance - Ineffective  Goal: Achieve or maintain patent airway  Outcome: Ongoing     Problem: Gas Exchange - Impaired  Goal: Absence of hypoxia  Outcome: Ongoing  Goal: Promote optimal lung function  Outcome: Ongoing     Problem: Breathing Pattern - Ineffective  Goal: Ability to achieve and maintain a regular respiratory rate  Outcome: Ongoing     Problem: Body Temperature -  Risk of, Imbalanced  Goal: Ability to maintain a body temperature within defined limits  Outcome: Ongoing  Goal: Complications related to the disease process, condition or treatment will be avoided or minimized  Outcome: Ongoing   Patient resting in bed FIO2 increased from 90 to 100 % to maintain O2 sats. No other needs voiced from patient call bell with in reach.

## 2021-04-29 NOTE — PROGRESS NOTES
Hospitalist Progress Note    Patient:  Douglas Vilchis      Unit/Bed:3A-01/001-A    YOB: 1945    MRN: [id number]       Acct: [de-identified]     PCP: Benny Lanes, MD    Date of Admission: 4/22/2021    Active Hospital Problems    Diagnosis Date Noted    COVID-19 [U07.1] 04/22/2021     Assessment/Plan:    1. Acute hypoxic respiratory failure 2/2 COVID-19 PNA with Superimposed Pulmonary Edema: Initially on 5L NC, now on HFNC. Cont PO Decadron, will give Taclozumab. Cont Vit D, C, Zinc, Pepcid, Flonase. One time dose of IV Lasix 40. Prophylactic lovenox deferred (supratherapeutic INR on coumadin). Pulmonary hygiene. Supportive care. --4/24: pt on HFNC 80 FiO2 and 30 L/Min. CXR today showing worsening infiltrates/edema. Pt received Talcozumab, start on IV lasix 40 qd. Cont PO Decadron. Vit D, C, Zinc, Pepcid, Flonase. IS and Acapella. --4/25: requiring more HFNC today, up to 85 FiO2 and 60 L/Min. Cont IV Laix, PO Decadron. Vit D, C, Zinc, Pepcid, Flonase. IS and Acapella. --4/26: still on HFNC, FiO2 75 and 60 L/Min. Getting CXR today. CCM.   --4/27: on HFNC, FiO2 70 and 60 L/Min. CCM. Encourage IS and Acapella. --4/28: pt declining, requiring BiPAP. CCM.   --4/29: back on HFNC today on maximum settings, saturating 88%. Will likely need to go back on BiPAP. Pending CXR. CCM. 2. AST elevation: Mild. Consistent with COVID-19 infection. Trend daily.     3. NIDDM2, with hyperglycemia: A1C 7.0 in 1/2021. Metformin held. SSI initiated and Lantus 20U started. (target IP -180). POCT BG checks. Hypoglycemia protocol. Carb control diet.     4. S/p mechanical AVR (anticoagulated on coumadin): INR 12.1 on arrival (target 2.5-3.5). No active bleeding assessed. Lower-dose (2 mg IV) vitamin K administered given indication for coumadin. PT/INR daily. Pharmacy to dose warfarin when resumed. Monitor clinically for signs/symptoms of bleeding. Stress ulcer prophylaxis.     --4/24: INR 2.11 today. Will start on heparin drip. Pharmacy to dose coumadin. --4/25: INR 2.01 today, will resume Coumadin today. Cont Heparin drip until INR is 2.5.   --4/26: INR 2.06. Cont Coumadin and Heparin drip. --4/27: INR 2.85 today, will discontinue Heparin drip.           5. Primary HTN:  HCTZ, BB, ARB resumed. Monitor BP.     6. CAD / HLD: Daily ASA, statin resumed (AST < 3x upper limit of normal).     7. COPD (patient-reported): Without exacerbation. Never smoker. Pulmonary hygiene as above.     8. Complete heart block (s/p BIV): Noted. A-V paced on EKG.     9. Acute on Chronic systolic CHF with recovered EF (50-55% per TTE 11/2019 / s/p AICD): IV Lasix 40 qd. BB. Hold HCTZ and Cozaar. No outpatient loop diuretics listed. Monitor I/O, daily weights.     10. Gout: Without exacerbation. Allopurinol resumed. Chief Complaint: shortness of breath    Hospital Course: 76 y/o M PMHx HTN, HLD, CAD, NIDDM2, ? COPD (never-smoker), chronic systolic CHF with recovered EF (50-55% per TTE 11/2019 / s/p AICD), complete heart block (s/p BIV) and mechanical AVR (anticoagulated on coumadin) -- presents to [facility] with a chief complaint of shortness of breath. History obtained from the patient.     Patient tested positive for COVID approximately 10 days ago / has experienced worsening shortness of breath and fatigue since diagnosis / + decreased oral intake, watery diarrhea for the past few days. Evaluated recently in the ED (4/17) / no hypoxia / discharged on decadron/doxycycline with continued progression of symptoms. Denies fever, emesis, chest pain, palpitations, abdominal pain or urinary symptoms. SpO2 at home: 82% on RA / decided to proceed to the ED for evaluation. Denies sick contacts.     ED course: afebrile, BP/HR nml, tachypnic (20s) with SpO2 83% on RA > 94% on 5L NC. Workup remarkable for: , BUN 28 (Cr 0.8), AST 47, CRP 6. 12. CBC nml. Lytes nml. Procal neg. BNP nml. Trop neg. D-Dimer nml.  EKG: AV dual-paced with occasional PVCs. Portable CXR: CHF with increased central pulmonary vascular congestion and mild interstitial edema. Received 500 cc IVF bolus, duoneb in the ED. Hospitalist service contacted for admission. Please see A&P for additional details. Subjective: no acute events overnight. Pt sitting up in chair, on HFNC. No fevers, chills, chest pain. Pt still coughing and sob. Eating better today.      Medications:  Reviewed    Infusion Medications    sodium chloride      dextrose       Scheduled Medications    warfarin  2 mg Oral Once    insulin glargine  40 Units Subcutaneous QAM AC    famotidine  20 mg Oral BID    warfarin (COUMADIN) daily dosing (placeholder)   Other RX Placeholder    furosemide  40 mg Intravenous Daily    allopurinol  300 mg Oral Daily    aspirin  81 mg Oral Daily    atorvastatin  80 mg Oral Nightly    [Held by provider] hydroCHLOROthiazide  25 mg Oral QAM    [Held by provider] losartan  25 mg Oral Daily    metoprolol tartrate  50 mg Oral BID    sodium chloride flush  10 mL Intravenous 2 times per day    glycopyrrolate-formoterol  2 puff Inhalation BID    insulin lispro  0-12 Units Subcutaneous TID WC    insulin lispro  0-6 Units Subcutaneous Nightly    ascorbic acid  1,000 mg Oral Daily    zinc sulfate  50 mg Oral Daily    fluticasone  1 spray Each Nostril Daily    Vitamin D  1,000 Units Oral Daily    dexamethasone  6 mg Oral Daily     PRN Meds: magnesium sulfate, bisacodyl, potassium chloride **OR** potassium alternative oral replacement **OR** potassium chloride, polyethylene glycol, ipratropium-albuterol, sodium chloride flush, sodium chloride, promethazine **OR** ondansetron, acetaminophen **OR** acetaminophen, glucose, dextrose, glucagon (rDNA), dextrose, melatonin      Intake/Output Summary (Last 24 hours) at 4/29/2021 1110  Last data filed at 4/29/2021 0943  Gross per 24 hour   Intake 500 ml   Output 850 ml   Net -350 ml       Diet:  DIET GENERAL; Carb Control: 3 carb choices (45 gms)/meal; Low Sodium (2 GM)  Dietary Nutrition Supplements: Frozen Oral Supplement    Exam:  BP (!) 119/54   Pulse 60   Temp 98.4 °F (36.9 °C) (Oral)   Resp 24   Ht 5' 8\" (1.727 m)   Wt 198 lb 12.8 oz (90.2 kg)   SpO2 (!) 88%   BMI 30.23 kg/m²     General appearance: Lethargic-appearing  male. Pleasant. Cooperative. NAD. On HFNC  HEENT: Normocephalic / atraumatic. Conjunctivae appear normal.  Neck: Supple. No JVD. Respiratory: Lung sounds diminished and coarse to auscultation bilaterally. No rales / rhonchi. No conversational dyspnea / accessory muscle use. Cardiovascular: RRR. Normal S1/S2. No murmurs / rubs / gallops. + Click (s/p mechanical AVR). Abdomen: Soft / non-tender / non-distended. BS present. Musculoskeletal: No cyanosis. 1+ pitting edema to BLE. Skin: Warm / dry. No pallor / diaphoresis. Neurologic: A/O x 3. Speech normal. Answers questions appropriately. No obvious focal neurologic deficits. Psychiatric: Thought content / judgment / insight appear appropriate. Peripheral pulses: Normal bilaterally. Labs:   Recent Labs     04/29/21  0544   WBC 12.5*   HGB 16.5   HCT 47.6        Recent Labs     04/29/21  0545      K 4.1      CO2 28   BUN 42*   CREATININE 0.9   CALCIUM 9.1     Recent Labs     04/29/21  0545   AST 46*   ALT 88*   BILITOT 1.0   ALKPHOS 65     Recent Labs     04/29/21  0544   INR 3.55*     No results for input(s): Shanae Lemme in the last 72 hours. Urinalysis:      Lab Results   Component Value Date    NITRU NEGATIVE 11/01/2018    BLOODU NEGATIVE 11/01/2018    GLUCOSEU NEGATIVE 11/01/2018       Radiology:   All imaging reviewed     Diet: DIET GENERAL; Carb Control: 3 carb choices (45 gms)/meal; Low Sodium (2 GM)  Dietary Nutrition Supplements: Frozen Oral Supplement      Code Status: Limited            Electronically signed by Hilaria Gordon MD on 4/29/2021 at 11:10 AM

## 2021-04-29 NOTE — CARE COORDINATION
4/29/21, 7:53 AM EDT    DISCHARGE ON GOING [address] day: 7  Location: 3A-01/001-A Reason for admit: COVID-19 [U07.1]   Procedure:   4-22-21 CXR:  CHF with increased central pulmonary vascular congestion and mild   interstitial edema. 04/28  CXR:   Stable bilateral airspace disease. No acute changes             04/29 CXR:   1. Normal heart size. Permanent dual-chamber pacemaker. Metallic sternotomy sutures. Questionable tiny effusion left side. 2. Moderate patchy infiltrates scattered throughout both lungs, consistent with pneumonia. 3. Overall appearance has worsened since yesterday.           Barriers to Discharge: INR 3.55, OT saw, BiPAP at 95% FiO2 and shows O2 saturation in 92-95% with up chair, electrolyte replacement, Vitamin C, Vit D, Zinc, ASA, Decadron, PPI, Lasix, daily weights, ISS w/ accu checks. PCP: Niecy Ty MD  Readmission Risk Score: 20%  Patient Goals/Plan/Treatment Preferences: Plans home with wife and prefers [facility] for home O2 needs.

## 2021-04-29 NOTE — FLOWSHEET NOTE
04/29/21 1300   Provider Notification   Reason for Communication New orders   Provider Name Dr. Malu Love   Provider Notification Physician   Method of Communication Secure Message   Response No new orders   Notification Time [phone]   He wants his code status to be limited no intubation, but yes to shock, CPR and meds.

## 2021-04-29 NOTE — PROGRESS NOTES
ACTIVITIES:  Patient completed BUE exercises x15 reps x1 set using 1# weighted item in all joints/planes, limited ROM noted at times in R shoulder. Patient required lengthy rest breaks throughout exercises due to decrease in O2 saturations (noted above). Patient completed to increase strength and activity tolerance required for ADLs and toilet transfers     ASSESSMENT:     Activity Tolerance:  Patient tolerance of  treatment: fair. Patient limited this session due to decreased O2 saturations on HFNC 60L 100% FiO2. Patient able to demonstrate use of deep breathing however only maintaining between 88-89%. Discharge Recommendations: Continue to assess pending progress, Patient would benefit from continued therapy after discharge   Equipment Recommendations: Equipment Needed: No  Other: May benefit from having a shower chair  Plan: Times per week: 5x  Specific instructions for Next Treatment: Functional mobility; standing ADLs; UE exercises and endurance training  Current Treatment Recommendations: Endurance Training, Functional Mobility Training, Self-Care / ADL  Plan Comment: Pt would benefit from continued skilled OT services when medically stable and discharged from Acute. HHOT recommended. Patient Education  Patient Education: deep breathing, BUE exercises    Goals  Short term goals  Time Frame for Short term goals: By discharge  Short term goal 1: Pt will demonstrate functional mobility walking in his room with supervison while keeping O2 saturation > than 90% to prepare for doing sinkside grooming or toileting routine. Short term goal 2: Pt will complete simple standing ADLs for over 5 mintues with SBA while keeping his O2 saturation > than 90% with cues as needed to increase his endurance and activity tolerance for self care and homekaking tasks.   Short term goal 3: Pt will complete BUE moderate resistance exercises while following demonstration for proper breathing technique to increase his endurance

## 2021-04-29 NOTE — PROGRESS NOTES
[address] [facility] PHYSICAL THERAPY  EVALUATION  [facility] 3A - 3A-01/001-A    Time In: 8653  Time Out: 1044  Timed Code Treatment Minutes: 10 Minutes  Minutes: 16          Date: 2021  Patient Name: Perlita Sauer,  Gender:  male        MRN: [id number]  : 1945  (77 y.o.)      Referring Practitioner: Dalton Bullock MD  Diagnosis: COVID-19  Additional Pertinent Hx: Per H&P \"69 y/o M PMHx HTN, HLD, CAD, NIDDM2, ? COPD (never-smoker), chronic systolic CHF with recovered EF (50-55% per TTE 2019 / s/p AICD), complete heart block (s/p BIV) and mechanical AVR (anticoagulated on coumadin) -- presents to [facility] with a chief complaint of shortness of breath. History obtained from the patient. Patient tested positive for COVID approximately 10 days ago / has experienced worsening shortness of breath and fatigue since diagnosis / + decreased oral intake, watery diarrhea for the past few days. Evaluated recently in the ED () / no hypoxia / discharged on decadron/doxycycline with continued progression of symptoms. Denies fever, emesis, chest pain, palpitations, abdominal pain or urinary symptoms. SpO2 at home: 82% on RA / decided to proceed to the ED for evaluation. Denies sick contacts. \"     Restrictions/Precautions:  Restrictions/Precautions: Isolation, Fall Risk  Position Activity Restriction  Other position/activity restrictions: Droplet plus isolation; BiPap assist with mask- HFNC during mealtime.  HFNC 60L 100%    Subjective:  Chart Reviewed: Yes  Patient assessed for rehabilitation services?: Yes  Family / Caregiver Present: No  Subjective: RN approved session. Pt pleasant and agreeable to therapy. Pt needing cues for deep breathing and to inhale through nose.     General:  Overall Orientation Status: Within Functional Limits  Follows Commands: Within Functional Limits    Vision: Within Functional Limits    Hearing: Exceptions to [facility]  Hearing Exceptions: Bilateral hearing aid, Hard of hearing/hearing concerns         Pain: 0/10: denies     Vitals: Oxygen: oxygen remained 87-91% during exercises, dropped to 81% with standing     Social/Functional History:    Lives With: Spouse  Type of Home: House  Home Layout: Able to Live on Main level with bedroom/bathroom  Home Access: Stairs to enter with rails  Entrance Stairs - Number of Steps: 8 steps with B rails  Entrance Stairs - Rails: Both     Bathroom Shower/Tub: Walk-in shower  Bathroom Toilet: Standard  Bathroom Accessibility: Accessible    Receives Help From: Family  ADL Assistance: Independent  Homemaking Assistance: Independent  Homemaking Responsibilities: Yes  Ambulation Assistance: Independent  Transfer Assistance: Independent    Active : Yes  Occupation: Retired  Type of occupation: 5 year ago  [address]: 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 He did his own yardwork. He mows a 1/2 acre with a self propelled mower. OBJECTIVE:  Range of Motion:  Bilateral Lower Extremity: WFL    Strength:  Bilateral Lower Extremity: Impaired - grossly deconditioned    Balance:  Static Sitting Balance:  Stand By Assistance  Static Standing Balance: Contact Guard Assistance   *remained standing for ~15s before needing to sit due to desat     Bed Mobility:  Not Tested   *pt up in chair and asking to remain there     Transfers:  Sit to Stand: Contact Guard Assistance  Stand to Sit:Contact Guard Assistance    Ambulation:  Not Tested  *unable due to oxygen saturation dropping with standing     Exercise:  Patient was guided in 1 set(s) 10 reps of exercise to both lower extremities. Ankle pumps, Glut sets, Quad sets, Heelslides and Short arc quads. Exercises were completed for increased independence with functional mobility.     Functional Outcome Measures: Completed  AM-PAC Inpatient Mobility Raw Score : 17  AM-PAC Inpatient T-Scale Score : 42.13    ASSESSMENT:  Activity Tolerance:  Patient tolerance of  Treatment: fair. Pt with decreased oxygen saturation with limited activity. Needs cueing for deep breathing/pursed lip breathing. Treatment Initiated: Treatment and education initiated within context of evaluation. Evaluation time included review of current medical information, gathering information related to past medical, social and functional history, completion of standardized testing, formal and informal observation of tasks, assessment of data and development of plan of care and goals. Treatment time included skilled education and facilitation of tasks to increase safety and independence with functional mobility for improved independence and quality of life. Assessment: Body structures, Functions, Activity limitations: Decreased functional mobility , Decreased posture, Decreased endurance, Decreased strength, Decreased balance  Assessment: Pt demonstrates a decrease in baseline by way of bed mobility, transfers and ambulation secondary to decreased activity tolerance, strength, fatigue, and balance deficits. Pt will benefit from skilled PT services throughout admission and beyond hospital discharge for improvements in functional mobility.    Prognosis: Good    REQUIRES PT FOLLOW UP: Yes    Discharge Recommendations:  Discharge Recommendations: Continue to assess pending progress, Home with Home health PT    Patient Education:  PT Education: Goals, PT Role, Plan of Care  Patient Education: Pursed lip breathing    Equipment Recommendations:  Equipment Needed: No    Plan:  Times per week: 3-5x GM  Times per day: Daily  Current Treatment Recommendations: Strengthening, Gait Training, Patient/Caregiver Education & Training, Stair training, Equipment Evaluation, Education, & procurement, Balance Training, Functional Mobility Training, Endurance Training, Home Exercise Program, Transfer Training, Safety Education & Training    Goals:  Patient goals : to go home  Short term goals  Time Frame for Short term goals: by discharge  Short term goal 1: sit <> supine with HOB flat, no rails and mod I for increased functional ind  Short term goal 2: sit <> stand with no AD from various surfaces mod I for safe transfers  Short term goal 3: ambulate 200' ft with no AD mod I for safe household ambulation  Short term goal 4: ascend/descend 8 steps with rail and mod I for safe enter/exit of home  Long term goals  Time Frame for Long term goals : NA due to short ELOS    Following session, patient left in safe position with all fall risk precautions in place.     Marielle Hua PT, DPT [id number]

## 2021-04-29 NOTE — PLAN OF CARE
Problem: Airway Clearance - Ineffective  Goal: Achieve or maintain patent airway  4/29/2021 0937 by Livia Mason RN  Outcome: Ongoing  Note: He is currently on highflow O2 on 100% Fi O2 and 60 liters. He desats very quickly with very minimal activity (talking/eating/pivoting to the commode). Problem: Gas Exchange - Impaired  Goal: Absence of hypoxia  4/29/2021 0937 by Livia Mason RN  Outcome: Ongoing  Note: He is currently on highflow O2 on 100% Fi O2 and 60 liters. He desats very quickly with very minimal activity (talking/eating/pivoting to the commode). Problem: Gas Exchange - Impaired  Goal: Promote optimal lung function  4/29/2021 0937 by Livia Mason RN  Outcome: Ongoing  Note: Lung sounds, pulse ox, and breathing monitored throughout shift. Discussed correct technique and importance of deep breathing & coughing exercises with patient. Patient able to demonstrate cough & deep breathing exercises to nurse. Problem: Breathing Pattern - Ineffective  Goal: Ability to achieve and maintain a regular respiratory rate  4/29/2021 0937 by Livia Mason RN  Outcome: Ongoing     Problem: Body Temperature -  Risk of, Imbalanced  Goal: Ability to maintain a body temperature within defined limits  4/29/2021 0937 by Livia Mason RN  Outcome: Ongoing  Note: Afebrile so far this shift. Problem: Body Temperature -  Risk of, Imbalanced  Goal: Complications related to the disease process, condition or treatment will be avoided or minimized  4/29/2021 0937 by Livia Mason RN  Outcome: Ongoing     Problem: Isolation Precautions - Risk of Spread of Infection  Goal: Prevent transmission of infection  4/29/2021 0937 by Livia Mason RN  Outcome: Ongoing  Note: Proper contact isolation precautions followed including (gown, gloves, mask and handwashing) used to prevent the spread of infection.        Problem: Nutrition Deficits  Goal: Optimize nutritional status  4/29/2021 0937 by Livia Mason RN  Outcome: Ongoing  Note: Encouraged oral intake. Problem: Risk for Fluid Volume Deficit  Goal: Maintain normal serum potassium, sodium, calcium, phosphorus, and pH  4/29/2021 0937 by Livia Isidro RN  Outcome: Ongoing  Note: Labs checked per physician's orders. Problem: Loneliness or Risk for Loneliness  Goal: Demonstrate positive use of time alone when socialization is not possible  4/29/2021 5459 by Livia Isidro RN  Outcome: Ongoing     Problem: Fatigue  Goal: Verbalize increase energy and improved vitality  4/29/2021 0937 by Livia Isidro RN  Outcome: Ongoing     Problem: Falls - Risk of:  Goal: Will remain free from falls  Description: Will remain free from falls  4/29/2021 9041 by Livia Isidro RN  Outcome: Ongoing  Note: Assessment & interventions provided throughout shift. Bed locked & in low position, call light in reach, side-rails up x2, bed/chair alarm utilized, non-slip socks on when ambulating, reminded patient to use call light to call for assistance. Problem: Falls - Risk of:  Goal: Absence of physical injury  Description: Absence of physical injury  4/29/2021 3648 by Livia Isidro RN  Outcome: Ongoing     Problem: Discharge Planning:  Goal: Discharged to appropriate level of care  Description: Discharged to appropriate level of care  4/29/2021 0937 by Livia Isidro RN  Outcome: Ongoing  Note: He is from home with his wife and plans on returning there at discharge. Care plan reviewed with patient. Patient verbalizes understanding of the care plan and contributed to goal setting.

## 2021-04-29 NOTE — PROGRESS NOTES
Clinical Pharmacy Note    Warfarin consult follow-up    Recent Labs     04/29/21  0544   INR 3.55*     Recent Labs     04/27/21  0418 04/28/21  0526 04/29/21  0544   HGB 17.1 17.5 16.5   HCT 49.3 51.7 47.6    300 254       Significant Drug-Drug Interactions:  New warfarin drug-drug interactions: none  Discontinued drug-drug interactions: none  Current warfarin drug-drug interactions: allopurinol, aspirin, dexamethasone     Date INR Warfarin Dose   4/23/2021 12.13 2 mg Vitamin K IV   4/24/2021 2.11 No  warfarin   4/25/2021  Warf restart 2.01 6 mg   4/26/2021 2.06 5 mg    4/27/2021 2.85  2 mg    4/28/2021  3.32  2 mg     4/29/2021 3.55                     2 mg      Notes:                   Daily PT/INR until stable within therapeutic range.      Vjiaya Watkins PharmD 4/29/2021 7:19 AM

## 2021-04-30 NOTE — PROGRESS NOTES
Comprehensive Nutrition Assessment    Type and Reason for Visit:  Reassess(ONS FU)    Nutrition Recommendations/Plan:   Continue diet per MD - recommend liberalizing to General until appetite improves  Continue Magic Cup TID as pt. Dislikes the Glucerna  Encouraged yogurt daily for probiotic benefits - recommend culturelle daily as po intake is poor  Bowel meds per physician    Wife wanted home vitamin A resumed which she told RN was 9000 mcg and pt. States he goes to Dr. Tanisha York. Last note from Dr. Tanisha York does not mention the vitamin A. Per wife this Rx was from a Cancer Dr. Nils Councilman see - ? Name and notes not available in [address] [place] [address] Rd. RDI for vitamin A is 900mcg for males with upper daily limit recommended not to exceed 3000 mcg. Spoke with PharmD who has spoke with MD earlier - 6000mcg daily ordered. Nutrition Assessment:    Pt. nutritionally compromised AEB reported poor intake and diarrhea ~1 week pta; poor po since admit (day 8).  At risk for further nutrition compromise r/t admit (+) covid  and underlying medical condition HTN, DM (A1c 7% Jan 2021), CAD, AVR.   Nutrition recommendations/interventions as per above. Malnutrition Assessment:  Malnutrition Status: At risk for malnutrition (Comment)    Context:  Acute Illness       Estimated Daily Nutrient Needs:  Energy (kcal):  3066-0573 (30-32/kgm IBW) late phase; Weight Used for Energy Requirements:  Ideal(154# - 70kgm)     Protein (g):  ~140 gms (2/kgm IBW); Weight Used for Protein Requirements:  Ideal        Fluid (ml/day):  per MD;        Nutrition Related Findings:  covid Dx 4/13; pt. seen this pm; on HFNC; ate better at lunch - got requests for tomorrows breakfast and entered in kitchen system; likes the magic cup ONS; per RN dyspneic with exertion; BM x1 4/28; meds include decadron, lasix, lantus, humalog, vitamin C and D, zinc; glucose 118  BUN 41  creatinine 0.8  MAP 79; spoke with PharmD and pt.  - wife brought in home vitamin A - MD Rx 6mg daily

## 2021-04-30 NOTE — PLAN OF CARE
Problem: Nutrition Deficits  Goal: Optimize nutritional status  4/30/2021 1514 by Clifton Jeffers RD, LD  Outcome: Ongoing  Nutrition Problem #1: Inadequate oral intake  Intervention: Food and/or Nutrient Delivery: Continue Current Diet, Continue Oral Nutrition Supplement, Mineral Supplement, Vitamin Supplement  Nutritional Goals: pt. to consume greater than 75% of meals and ONS during LOS

## 2021-04-30 NOTE — CARE COORDINATION
4/30/21, 7:44 AM EDT    DISCHARGE ON GOING EVALUATION    [facility] day: 8  Location: -01/001-A Reason for admit: COVID-19 [U07.1]   Procedure:   4-22-21 CXR:  CHF with increased central pulmonary vascular congestion and mild   interstitial edema. 04/28  CXR:   Stable bilateral airspace disease. No acute changes                04/29 CXR:   1. Normal heart size. Permanent dual-chamber pacemaker. Metallic sternotomy sutures. Questionable tiny effusion left side. 2. Moderate patchy infiltrates scattered throughout both lungs, consistent with pneumonia. 3. Overall appearance has worsened since yesterday.           Barriers to Discharge: palliative care for code status clarification, magic cups per dietitian, PT/OT, HHF 60 L /min, 100% FiO2, sats 92%,  electrolyte replacement, Vitamin C, Vit D, Zinc, ASA, Decadron, PPI, Lasix, daily weights, ISS w/ accu checks.     PCP: Francee Goldberg, MD  Readmission Risk Score: 21%  Patient Goals/Plan/Treatment Preferences: from home with wife; plans return and prefers [facility] for home oxygen needs.

## 2021-04-30 NOTE — PLAN OF CARE
Problem: Airway Clearance - Ineffective  Goal: Achieve or maintain patent airway  4/30/2021 0640 by August Redd RN  Outcome: Met This Shift  4/29/2021 1740 by Jolene Rhoades RCP  Outcome: Ongoing     Problem:  Body Temperature -  Risk of, Imbalanced  Goal: Ability to maintain a body temperature within defined limits  Outcome: Met This Shift  Goal: Complications related to the disease process, condition or treatment will be avoided or minimized  Outcome: Met This Shift     Problem: Isolation Precautions - Risk of Spread of Infection  Goal: Prevent transmission of infection  Outcome: Met This Shift     Problem: Risk for Fluid Volume Deficit  Goal: Maintain absence of muscle cramping  Outcome: Met This Shift  Goal: Maintain normal serum potassium, sodium, calcium, phosphorus, and pH  Outcome: Met This Shift     Problem: Falls - Risk of:  Goal: Will remain free from falls  Description: Will remain free from falls  Outcome: Met This Shift  Goal: Absence of physical injury  Description: Absence of physical injury  Outcome: Met This Shift

## 2021-04-30 NOTE — PROGRESS NOTES
Hospitalist Progress Note    Patient:  Ryan Guidry      Unit/Bed:3A-01/001-A    YOB: 1945    MRN: [id number]       Acct: [de-identified]     PCP: Diona Boast, MD    Date of Admission: 4/22/2021    Active Hospital Problems    Diagnosis Date Noted    COVID-19 [U07.1] 04/22/2021     Assessment/Plan:    1. Acute hypoxic respiratory failure 2/2 COVID-19 PNA with Superimposed Pulmonary Edema/ARDS: Initially on 5L NC, now on HFNC. Cont PO Decadron, will give Taclozumab. Cont Vit D, C, Zinc, Pepcid, Flonase. One time dose of IV Lasix 40. Prophylactic lovenox deferred (supratherapeutic INR on coumadin). Pulmonary hygiene. Supportive care. --4/24: pt on HFNC 80 FiO2 and 30 L/Min. CXR today showing worsening infiltrates/edema. Pt received Talcozumab, start on IV lasix 40 qd. Cont PO Decadron. Vit D, C, Zinc, Pepcid, Flonase. IS and Acapella. --4/25: requiring more HFNC today, up to 85 FiO2 and 60 L/Min. Cont IV Laix, PO Decadron. Vit D, C, Zinc, Pepcid, Flonase. IS and Acapella. --4/26: still on HFNC, FiO2 75 and 60 L/Min. Getting CXR today. CCM.   --4/27: on HFNC, FiO2 70 and 60 L/Min. CCM. Encourage IS and Acapella. --4/28: pt declining, requiring BiPAP. CCM.   --4/29: back on HFNC today on maximum settings, saturating 88%. Will likely need to go back on BiPAP. Pending CXR. CCM.   --4/30: pt still on maximum HFNC settings at 88-90%, may need to place back on BiPAP today. CXR showing worsening. CCM. 2. AST elevation: Mild. Consistent with COVID-19 infection. Trend daily.     3. NIDDM2, with hyperglycemia: A1C 7.0 in 1/2021. Metformin held. SSI initiated and Lantus 20U started. (target IP -180). POCT BG checks. Hypoglycemia protocol. Carb control diet.     4. S/p mechanical AVR (anticoagulated on coumadin): INR 12.1 on arrival (target 2.5-3.5). No active bleeding assessed. Lower-dose (2 mg IV) vitamin K administered given indication for coumadin. PT/INR daily.  Pharmacy to 300, BUN 28 (Cr 0.8), AST 47, CRP 6. 12. CBC nml. Lytes nml. Procal neg. BNP nml. Trop neg. D-Dimer nml. EKG: AV dual-paced with occasional PVCs. Portable CXR: CHF with increased central pulmonary vascular congestion and mild interstitial edema. Received 500 cc IVF bolus, duoneb in the ED. Hospitalist service contacted for admission. Please see A&P for additional details. Subjective: no acute events overnight. Pt sitting up in bed, on HFNC. No fevers, chills, chest pain. Pt still coughing and sob.      Medications:  Reviewed    Infusion Medications    sodium chloride      dextrose       Scheduled Medications    insulin glargine  40 Units Subcutaneous QAM AC    famotidine  20 mg Oral BID    warfarin (COUMADIN) daily dosing (placeholder)   Other RX Placeholder    furosemide  40 mg Intravenous Daily    allopurinol  300 mg Oral Daily    aspirin  81 mg Oral Daily    atorvastatin  80 mg Oral Nightly    [Held by provider] hydroCHLOROthiazide  25 mg Oral QAM    [Held by provider] losartan  25 mg Oral Daily    metoprolol tartrate  50 mg Oral BID    sodium chloride flush  10 mL Intravenous 2 times per day    glycopyrrolate-formoterol  2 puff Inhalation BID    insulin lispro  0-12 Units Subcutaneous TID WC    insulin lispro  0-6 Units Subcutaneous Nightly    ascorbic acid  1,000 mg Oral Daily    zinc sulfate  50 mg Oral Daily    fluticasone  1 spray Each Nostril Daily    Vitamin D  1,000 Units Oral Daily    dexamethasone  6 mg Oral Daily     PRN Meds: magnesium sulfate, bisacodyl, potassium chloride **OR** potassium alternative oral replacement **OR** potassium chloride, polyethylene glycol, ipratropium-albuterol, sodium chloride flush, sodium chloride, promethazine **OR** ondansetron, acetaminophen **OR** acetaminophen, glucose, dextrose, glucagon (rDNA), dextrose, melatonin      Intake/Output Summary (Last 24 hours) at 4/30/2021 1059  Last data filed at 4/30/2021 1011  Gross per 24 hour   Intake 610 ml   Output 1600 ml   Net -990 ml       Diet:  DIET GENERAL; Carb Control: 3 carb choices (45 gms)/meal; Low Sodium (2 GM)  Dietary Nutrition Supplements: Frozen Oral Supplement    Exam:  BP (!) 118/52   Pulse 64   Temp 98.1 °F (36.7 °C) (Oral)   Resp 18   Ht 5' 8\" (1.727 m)   Wt 201 lb 3.2 oz (91.3 kg)   SpO2 (!) 88%   BMI 30.59 kg/m²     General appearance: Lethargic-appearing  male. Pleasant. Cooperative. NAD. On HFNC  HEENT: Normocephalic / atraumatic. Conjunctivae appear normal.  Neck: Supple. No JVD. Respiratory: Lung sounds diminished and coarse to auscultation bilaterally. No rales / rhonchi. No conversational dyspnea / accessory muscle use. Cardiovascular: RRR. Normal S1/S2. No murmurs / rubs / gallops. + Click (s/p mechanical AVR). Abdomen: Soft / non-tender / non-distended. BS present. Musculoskeletal: No cyanosis. 1+ pitting edema to BLE. Skin: Warm / dry. No pallor / diaphoresis. Neurologic: A/O x 3. Speech normal. Answers questions appropriately. No obvious focal neurologic deficits. Psychiatric: Thought content / judgment / insight appear appropriate. Peripheral pulses: Normal bilaterally. Labs:   Recent Labs     04/30/21  0404   WBC 12.5*   HGB 16.2   HCT 48.2        Recent Labs     04/30/21  0404      K 3.9   CL 99   CO2 23   BUN 41*   CREATININE 0.8   CALCIUM 8.9     Recent Labs     04/30/21  0404   AST 53*   ALT 89*   BILITOT 0.9   ALKPHOS 62     Recent Labs     04/30/21  0404   INR 3.37*     No results for input(s): Shanae Lemme in the last 72 hours. Urinalysis:      Lab Results   Component Value Date    NITRU NEGATIVE 11/01/2018    BLOODU NEGATIVE 11/01/2018    GLUCOSEU NEGATIVE 11/01/2018       Radiology:   All imaging reviewed     Diet: DIET GENERAL; Carb Control: 3 carb choices (45 gms)/meal; Low Sodium (2 GM)  Dietary Nutrition Supplements: Frozen Oral Supplement      Code Status: Limited            Electronically signed by Hilaria Gordon MD on 4/30/2021 at 10:59 AM

## 2021-04-30 NOTE — PROGRESS NOTES
Pt assisted into bed at this time from chair per request. X 1 limited assist for transfers. Pt has marked dyspnea with exertion and O2 sats decreased to 77% after transfer. Pt placed on BiPAP at this time. Sats returned to 95% once in bed. Call light in reach.

## 2021-04-30 NOTE — PROGRESS NOTES
[facility]           Progress Note    Patient Name:  Garrett Knox  Medical Record Number:  [id number]  YOB: 1945    Date:  4/30/2021  Time:  10:18 AM  Completed By:  Inez Hamman, RN    Reason for Palliative Care Evaluation:  Code status clarification. Pt admitted with resp failure due to COVID-19. PMH includes AVR in 2015, CHF, pacer and COPD. Pt has been alternating between HFNC and BiPAP for resp support. Current Issues: Pt states no distressing symptoms except fatigue. Pt states no pain or shortness of breath. []  Pain  []  Fatigue  []  Nausea  []  Anxiety  []  Depression  []  Shortness of Breath  []  Constipation  []  Appetite  []  Other:    Advance Directives  [] [facility] DNR Form  [] Living Will  [] Medical POA    Current Code Status  [] Full Resuscitation  [] DNR-Comfort Care-Arrest  [] DNR-Comfort Care  [x] Limited   [x] No CPR   [x] No shock   [x] No ET intubation/reintubation   [x] No resuscitative medications   [] Other limitation:    Family/Patient Discussion:  Pt sitting up in bedside chair, awake and alert, oriented to all spheres. O2 is on per HFNC at 100% and 60L/min flow. Pt is not having resp distress. Palliative care introduced, code status options reviewed, as well as explanation of intubation and MIV for resp failure. This nurse explained what each level of care entails, as well as possible complications of resuscitative measures, if needed. Course of care if intubation and MIV would be needed explained, including that the goal is to wean pt from ventilator as soon as he could tolerate. We also discussed the symptoms that pt may experience if impending resp failure, including somnolence, confusion, shortness of breath, etc.  Pt states that he has none of these symptoms and he doesn't plan on \"getting them\". Pt states that he is optimistic that he will progress without requiring \"life support\" machines to \"breathe for him\".     Pt states that if he would truly require intubation and MIV, he would not want it done but would want care to transition to focus on comfort and have hospice consulted and allow for comfort and natural death. Pt also requests no resuscitation for cardiac/resp arrest.  Both of these requests are in line with his current code status of Limited, no x4. Pt states that he watched his mom endure a \"horrible\" death being on machines and he doesn't want that for himself. Pt gives this nurse permission to call his wife to assure that she is aware of his wishes and code status limitations. Pt states that he has his cell phone and has been able to keep in contact with his wife. Pt states no further questions or needs. Emotional support and encouragement given. Call placed to pt's wife, Joaquin Grande, at [phone] with voicemail message received, call back requested with contact info given. No HIPPA info was contained in message. Writer will await call back from Joaquin Grande. Pt's nurse,KEREN Samuel updated. Secure text sent to Dr Marcello Sesay to update him. Plan/Follow-Up: Continue with current plan of care, current code status of Limited, no intubation, no CPR, no defib and no resuscitative meds in line with pt's wishes. Palliative care will continue to follow as needed. Floor to call PRN for acute needs. Tanya Mcclain RN  4/30/2021,  10:18 AM    11:00- Call back received from pt's wife,Molly. Discussion with pt and his request for code status limitations explained. Joaquin Grande states understanding and is supportive of pt's request for code status change.

## 2021-04-30 NOTE — PLAN OF CARE
Problem: Breathing Pattern - Ineffective  Goal: Ability to achieve and maintain a regular respiratory rate  4/30/2021 1520 by Xi King RN  Outcome: Ongoing  Note: Patient is breathing is rapid and patient is short of breath, lung sounds are diminished throughout with crackles noted in the bases, SpO2 drops down to mid 80's with movement even with hi Tom on - patient agreed to wear BiPAP this afternoon and is currently on BiPAP. 4/30/2021 0640 by August Redd RN  Outcome: Not Met This Shift     Problem: Body Temperature -  Risk of, Imbalanced  Goal: Ability to maintain a body temperature within defined limits  4/30/2021 1520 by Xi King RN  Outcome: Ongoing  Note: Patient's temperature remains WNL  4/30/2021 0640 by August Redd RN  Outcome: Met This Shift     Problem: Isolation Precautions - Risk of Spread of Infection  Goal: Prevent transmission of infection  4/30/2021 1520 by Xi King RN  Outcome: Ongoing  Note: Nursing staff used alcohol swabs to patient's bilateral nares this shift. Patient compliant with hygiene and had a bath today with total linen change. 4/30/2021 0640 by August Redd RN  Outcome: Met This Shift     Problem: Nutrition Deficits  Goal: Optimize nutritional status  4/30/2021 1520 by Xi King RN  Outcome: Ongoing  Note: Patient verbalizes appropriate appetite, denies n/v. Able to consume food and drink with no issues noted. Patient is on a carb control / low sodium diet. 4/30/2021 1514 by Jose Mckinnon RD, LD  Outcome: Ongoing  4/30/2021 0640 by August Redd RN  Outcome: Ongoing     Problem: Risk for Fluid Volume Deficit  Goal: Maintain normal heart rhythm  4/30/2021 1520 by Xi King RN  Outcome: Ongoing  Note: Patient's rhythm is AV paced sequential with no extraordinary abnormalities noted at this time.    4/30/2021 0640 by August Redd RN  Outcome: Not Met This Shift     Problem: Fatigue  Goal: Verbalize increase energy and improved vitality  4/30/2021 1520 by Katie Lott RN  Outcome: Ongoing  Note: Patient states feels better today than has been feeling, but still feels very tired. 4/30/2021 0640 by Bassam Sanchez RN  Outcome: Ongoing     Problem: Patient Education: Go to Patient Education Activity  Goal: Patient/Family Education  4/30/2021 1520 by Katie Lott RN  Outcome: Ongoing  Note: Patient and wife are educated extensively this shift. 4/30/2021 0640 by Bassam Sanchez RN  Outcome: Ongoing     Problem: Falls - Risk of:  Goal: Will remain free from falls  Description: Will remain free from falls  4/30/2021 1520 by Katie Lott RN  Outcome: Ongoing  Note: No falls noted this shift, patient uses call light appropriately and waits for staff prior to transferring self. Patient able to voice needs appropriately. Gait is steady with staff assistance but patient does get SOB with activity quickly  Patient is limited assistance with most ADLs. 4/30/2021 0640 by Bassam Sanchez RN  Outcome: Met This Shift  Goal: Absence of physical injury  Description: Absence of physical injury  4/30/2021 1520 by Katie Lott RN  Outcome: Ongoing  Note: No new physical injury noted this shift so far. 4/30/2021 0640 by Bassam Sanchez RN  Outcome: Met This Shift     Problem: Discharge Planning:  Goal: Discharged to appropriate level of care  Description: Discharged to appropriate level of care  4/30/2021 1520 by Katie Lott RN  Outcome: Ongoing  Note: Patient's discharge planning continues, patient plans of being discharged to home. Needs are less oxygen needs / doing better with respiratory issues. 4/30/2021 0640 by Bassam Sanchez RN  Outcome: Ongoing       Care plan reviewed with patient and family. Patient and family verbalize understanding of the plan of care and contribute to goal setting.

## 2021-04-30 NOTE — PROGRESS NOTES
Clinical Pharmacy Note    Warfarin consult follow-up    Recent Labs     04/30/21  0404   INR 3.37*     Recent Labs     04/28/21  0526 04/29/21  0544 04/30/21  0404   HGB 17.5 16.5 16.2   HCT 51.7 47.6 48.2    254 223       Significant Drug-Drug Interactions:  New warfarin drug-drug interactions: none  Discontinued drug-drug interactions: none  Current warfarin drug-drug interactions: allopurinol, aspirin, dexamethasone     Date INR Warfarin Dose   4/23/2021 12.13 2 mg Vitamin K IV   4/24/2021 2.11 No  warfarin   4/25/2021  Warf restart 2.01 6 mg   4/26/2021 2.06 5 mg    4/27/2021 2.85  2 mg    4/28/2021  3.32  2 mg     4/29/2021 3.55                     2 mg   4/30/2021 3.37                     3 mg                          Notes:                   Daily PT/INR until stable within therapeutic range.

## 2021-04-30 NOTE — PROGRESS NOTES
Nursing staff used alcohol swabs to patient's bilateral nares this shift. Giulia Abhishek, patient's wife, [phone], called and updated on patient's condition at this time, questions and concerns answered.

## 2021-05-01 NOTE — FLOWSHEET NOTE
Pt is a 75y. o. male in 3A-01.  contacted Westley's wife Niranjan Montes by telephone, to offer support. She shared that she is dealing with COVID herself, but is getting better each day, acknowledging that she hasn't been affected nearly as severe as Omega Calk. She mentioned that she-and the family-all know how serious this is and are realistic about it. She mentioned that he is a fighter and would appreciate prayers.  provided acive listening and prayer to end the call. 04/30/21 2130   Encounter Summary   Services provided to: Family   Referral/Consult From: [facility]   Support System Spouse   Continue Visiting Yes  (4/30)   Complexity of Encounter Low   Length of Encounter 15 minutes   Routine   Type Follow up   Assessment Coping; Hopeful   Intervention Prayer; Active listening;Nurtured hope   Outcome Expressed gratitude;Engaged in conversation

## 2021-05-01 NOTE — PROGRESS NOTES
administered given indication for coumadin. PT/INR daily. Pharmacy to dose warfarin when resumed. Monitor clinically for signs/symptoms of bleeding. Stress ulcer prophylaxis. --4/24: INR 2.11 today. Will start on heparin drip. Pharmacy to dose coumadin. --4/25: INR 2.01 today, will resume Coumadin today. Cont Heparin drip until INR is 2.5.   --4/26: INR 2.06. Cont Coumadin and Heparin drip. --4/27: INR 2.85 today, will discontinue Heparin drip.           5. Primary HTN:  HCTZ, BB, ARB resumed. Monitor BP.     6. CAD / HLD: Daily ASA, statin resumed (AST < 3x upper limit of normal).     7. COPD (patient-reported): Without exacerbation. Never smoker. Pulmonary hygiene as above.     8. Complete heart block (s/p BIV): Noted. A-V paced on EKG.     9. Acute on Chronic systolic CHF with recovered EF (50-55% per TTE 11/2019 / s/p AICD): IV Lasix 40 qd. BB. Hold HCTZ and Cozaar. No outpatient loop diuretics listed. Monitor I/O, daily weights.     10. Gout: Without exacerbation. Allopurinol resumed. Chief Complaint: shortness of breath    Hospital Course: 78 y/o M PMHx HTN, HLD, CAD, NIDDM2, ? COPD (never-smoker), chronic systolic CHF with recovered EF (50-55% per TTE 11/2019 / s/p AICD), complete heart block (s/p BIV) and mechanical AVR (anticoagulated on coumadin) -- presents to [facility] with a chief complaint of shortness of breath. History obtained from the patient.     Patient tested positive for COVID approximately 10 days ago / has experienced worsening shortness of breath and fatigue since diagnosis / + decreased oral intake, watery diarrhea for the past few days. Evaluated recently in the ED (4/17) / no hypoxia / discharged on decadron/doxycycline with continued progression of symptoms. Denies fever, emesis, chest pain, palpitations, abdominal pain or urinary symptoms. SpO2 at home: 82% on RA / decided to proceed to the ED for evaluation.  Denies sick contacts.     ED course: afebrile, BP/HR nml, tachypnic (20s) with SpO2 83% on RA > 94% on 5L NC. Workup remarkable for: , BUN 28 (Cr 0.8), AST 47, CRP 6. 12. CBC nml. Lytes nml. Procal neg. BNP nml. Trop neg. D-Dimer nml. EKG: AV dual-paced with occasional PVCs. Portable CXR: CHF with increased central pulmonary vascular congestion and mild interstitial edema. Received 500 cc IVF bolus, duoneb in the ED. Hospitalist service contacted for admission. Please see A&P for additional details. Subjective: no acute events overnight. Pt sitting up in chair, on HFNC. No fevers, chills, chest pain. Pt still coughing and sob. Tolerating PO intake.      Medications:  Reviewed    Infusion Medications    sodium chloride      dextrose       Scheduled Medications    warfarin  3 mg Oral Once    vitamin A  6 mg Oral Daily    insulin glargine  40 Units Subcutaneous QAM AC    famotidine  20 mg Oral BID    warfarin (COUMADIN) daily dosing (placeholder)   Other RX Placeholder    furosemide  40 mg Intravenous Daily    allopurinol  300 mg Oral Daily    aspirin  81 mg Oral Daily    atorvastatin  80 mg Oral Nightly    [Held by provider] hydroCHLOROthiazide  25 mg Oral QAM    [Held by provider] losartan  25 mg Oral Daily    metoprolol tartrate  50 mg Oral BID    sodium chloride flush  10 mL Intravenous 2 times per day    glycopyrrolate-formoterol  2 puff Inhalation BID    insulin lispro  0-12 Units Subcutaneous TID WC    insulin lispro  0-6 Units Subcutaneous Nightly    ascorbic acid  1,000 mg Oral Daily    zinc sulfate  50 mg Oral Daily    fluticasone  1 spray Each Nostril Daily    Vitamin D  1,000 Units Oral Daily     PRN Meds: benzocaine-menthol, biotene, magnesium sulfate, bisacodyl, potassium chloride **OR** potassium alternative oral replacement **OR** potassium chloride, polyethylene glycol, ipratropium-albuterol, sodium chloride flush, sodium chloride, promethazine **OR** ondansetron, acetaminophen **OR** acetaminophen, glucose, dextrose, glucagon (rDNA), Low Sodium (2 GM)  Dietary Nutrition Supplements: Frozen Oral Supplement      Code Status: Limited            Electronically signed by Keya Ogden MD on 5/1/2021 at 12:17 PM

## 2021-05-01 NOTE — PLAN OF CARE
Problem: RESPIRATORY  Goal: Clear lung sounds  Description: Clear lung sounds  Outcome: Ongoing  Note: maintenance

## 2021-05-01 NOTE — PROGRESS NOTES
Clinical Pharmacy Note    Warfarin consult follow-up    Recent Labs     05/01/21  0349   INR 3.23*     Recent Labs     04/29/21  0544 04/30/21  0404 05/01/21  0349   HGB 16.5 16.2 15.7   HCT 47.6 48.2 45.1    223 214       Significant Drug-Drug Interactions:  New warfarin drug-drug interactions: None  Discontinued drug-drug interactions: None  Current warfarin drug-drug interactions: allopurinol, aspirin, dexamethasone     Date INR Warfarin Dose   4/23/2021 12.13 2 mg Vitamin K IV   4/24/2021 2.11 No  warfarin   4/25/2021  Warf restart 2.01 6 mg   4/26/2021 2.06 5 mg    4/27/2021 2.85  2 mg    4/28/2021  3.32  2 mg     4/29/2021 3.55                     2 mg   4/30/2021 3.37                     3 mg    5/1/2021 3.23                      3 mg                             Notes:                   Daily PT/INR until stable within therapeutic range.      Lenora Milton, PharmD, BCPS  5/1/2021  7:50 AM

## 2021-05-01 NOTE — PROGRESS NOTES
This RN called and updated patient's wife, Aileen Chery, on current status and plan of care. No questions/concerns at this time.

## 2021-05-01 NOTE — PLAN OF CARE
Problem: Breathing Pattern - Ineffective  Goal: Ability to achieve and maintain a regular respiratory rate  4/30/2021 2019 by Jason Cuenca RN  Outcome: Ongoing  Note: Patient is breathing regular and somewhat labored, lung sounds are diminished throughout with crackles in RLL - Improvement is noted in flowsheets from this AM, SOB at times with rest and with activity, SpO2 remains above 89% on HiFlo NC at this time - 100% @ 60 LPM.     4/30/2021 1520 by Jason Cuenca RN  Outcome: Ongoing  Note: Patient is breathing is rapid and patient is short of breath, lung sounds are diminished throughout with crackles noted in the bases, SpO2 drops down to mid 80's with movement even with hi Tom on - patient agreed to wear BiPAP this afternoon and is currently on BiPAP. 4/30/2021 0640 by Ofelia Harper RN  Outcome: Not Met This Shift     Problem: Body Temperature -  Risk of, Imbalanced  Goal: Ability to maintain a body temperature within defined limits  4/30/2021 2019 by Jason Cuenca RN  Outcome: Ongoing  Note: Body temperature remains WNL  4/30/2021 1520 by Jason Cuenca RN  Outcome: Ongoing  Note: Patient's temperature remains WNL  4/30/2021 0640 by Ofelia Harper RN  Outcome: Met This Shift     Problem: Isolation Precautions - Risk of Spread of Infection  Goal: Prevent transmission of infection  4/30/2021 2019 by Jason Cuenca RN  Outcome: Ongoing  Note: Patient compliant with alcohol swabs in nares - verbalizes understanding of importance of isolation precautions. 4/30/2021 1520 by Jason Cuenca RN  Outcome: Ongoing  Note: Nursing staff used alcohol swabs to patient's bilateral nares this shift. Patient compliant with hygiene and had a bath today with total linen change.    4/30/2021 0640 by Ofelia Harper RN  Outcome: Met This Shift     Problem: Nutrition Deficits  Goal: Optimize nutritional status  4/30/2021 2019 by Jason Cuenca RN  Outcome: Ongoing  Note: Patient verbalizes appropriate appetite, denies n/v. Able to consume food and drink with no issues noted - patient states today his appetite has improved. Patient is on a carb control / low sodium diet. 4/30/2021 1520 by Rj Markham RN  Outcome: Ongoing  Note: Patient verbalizes appropriate appetite, denies n/v. Able to consume food and drink with no issues noted. Patient is on a carb control / low sodium diet. 4/30/2021 1514 by Owen Solorzano RD, LD  Outcome: Ongoing  4/30/2021 0640 by Brianna Estrada RN  Outcome: Ongoing     Problem: Risk for Fluid Volume Deficit  Goal: Maintain normal heart rhythm  4/30/2021 2019 by Rj Markham RN  Outcome: Ongoing  Note: Patient's HR remains in the 60's and rhythm appears sinus. 4/30/2021 1520 by Rj Markham RN  Outcome: Ongoing  Note: Patient's rhythm is AV paced sequential with no extraordinary abnormalities noted at this time. 4/30/2021 0640 by Brianna Estrada RN  Outcome: Not Met This Shift     Problem: Fatigue  Goal: Verbalize increase energy and improved vitality  4/30/2021 2019 by Rj Markham RN  Outcome: Ongoing  Note: Patient verbalizes feeling better today. 4/30/2021 1520 by Rj Markham RN  Outcome: Ongoing  Note: Patient states feels better today than has been feeling, but still feels very tired. 4/30/2021 0640 by Brianna Estrada RN  Outcome: Ongoing     Problem: Patient Education: Go to Patient Education Activity  Goal: Patient/Family Education  4/30/2021 2019 by Rj Markham RN  Outcome: Ongoing  Note: Patient educated and verbalizes understanding of education. 4/30/2021 1520 by Rj Markham RN  Outcome: Ongoing  Note: Patient and wife are educated extensively this shift.    4/30/2021 0640 by Brianna Estrada RN  Outcome: Ongoing     Problem: Falls - Risk of:  Goal: Will remain free from falls  Description: Will remain free from falls  4/30/2021 2019 by Rj Markham RN  Outcome: Ongoing  Note: No falls noted this shift, patient uses call light appropriately and waits for staff prior to transferring self. Patient able to voice needs appropriately. Gait is steady with staff assistance. Patient is limited - extensive assistance with most ADLs. 4/30/2021 1520 by Rajani Orr RN  Outcome: Ongoing  Note: No falls noted this shift, patient uses call light appropriately and waits for staff prior to transferring self. Patient able to voice needs appropriately. Gait is steady with staff assistance but patient does get SOB with activity quickly  Patient is limited assistance with most ADLs. 4/30/2021 0640 by Katelin Moscoso RN  Outcome: Met This Shift  Goal: Absence of physical injury  Description: Absence of physical injury  4/30/2021 2019 by Rajani Orr RN  Outcome: Ongoing  Note: No new physical injury noted this shift so far. 4/30/2021 1520 by Rajani Orr RN  Outcome: Ongoing  Note: No new physical injury noted this shift so far. 4/30/2021 0640 by Katelin Moscoso RN  Outcome: Met This Shift     Problem: Discharge Planning:  Goal: Discharged to appropriate level of care  Description: Discharged to appropriate level of care  4/30/2021 2019 by Rajani Orr RN  Outcome: Ongoing  Note: Patient's discharge planning continues, patient plans of being discharged to home. Needs are less demand for supplemental oxygen    4/30/2021 1520 by Rajani Orr RN  Outcome: Ongoing  Note: Patient's discharge planning continues, patient plans of being discharged to home. Needs are less oxygen needs / doing better with respiratory issues. 4/30/2021 0640 by Katelin Moscoso RN  Outcome: Ongoing       Care plan reviewed with patient. Patient does verbalize understanding of the plan of care and contribute to goal setting.

## 2021-05-02 NOTE — PLAN OF CARE
Problem: Breathing Pattern - Ineffective  Goal: Ability to achieve and maintain a regular respiratory rate  5/2/2021 1022 by Demi Rodríguez RN  Outcome: Ongoing  5/2/2021 0040 by Ronald Sloan RN  Outcome: Ongoing     Problem:  Body Temperature -  Risk of, Imbalanced  Goal: Ability to maintain a body temperature within defined limits  5/2/2021 1022 by Demi Rodríguez RN  Outcome: Ongoing  5/2/2021 0040 by Ronald Sloan RN  Outcome: Met This Shift     Problem: Isolation Precautions - Risk of Spread of Infection  Goal: Prevent transmission of infection  5/2/2021 1022 by Demi Rodríguez RN  Outcome: Ongoing  5/2/2021 0040 by Ronald Sloan RN  Outcome: Met This Shift     Problem: Nutrition Deficits  Goal: Optimize nutritional status  5/2/2021 1022 by Demi Rodríguez RN  Outcome: Ongoing  5/2/2021 0040 by Ronald Sloan RN  Outcome: Ongoing     Problem: Risk for Fluid Volume Deficit  Goal: Maintain normal heart rhythm  5/2/2021 1022 by Demi Rodríguez RN  Outcome: Ongoing  5/2/2021 0040 by Ronald Sloan RN  Outcome: Ongoing     Problem: Fatigue  Goal: Verbalize increase energy and improved vitality  5/2/2021 1022 by Demi Rodríguez RN  Outcome: Ongoing  5/2/2021 0040 by Ronald Sloan RN  Outcome: Ongoing     Problem: Patient Education: Go to Patient Education Activity  Goal: Patient/Family Education  5/2/2021 1022 by Demi Rodríguez RN  Outcome: Ongoing  5/2/2021 0040 by Ronald Sloan RN  Outcome: Met This Shift     Problem: Falls - Risk of:  Goal: Will remain free from falls  Description: Will remain free from falls  5/2/2021 1022 by Demi Rodríguez RN  Outcome: Ongoing  5/2/2021 0040 by Ronald Sloan RN  Outcome: Met This Shift  Goal: Absence of physical injury  Description: Absence of physical injury  5/2/2021 0040 by Ronald Sloan RN  Outcome: Met This Shift     Problem: Discharge Planning:  Goal: Discharged to appropriate level of care  Description: Discharged to appropriate level of care  5/2/2021 1022 by Cathy Loredo RN  Outcome: Ongoing  5/2/2021 0040 by Beryl Castrejon RN  Outcome: Ongoing

## 2021-05-02 NOTE — PLAN OF CARE
No elevated temperature during shift. Problem: Body Temperature -  Risk of, Imbalanced  Goal: Ability to maintain a body temperature within defined limits  Outcome: Met This Shift   Patient educated on need to wash hands and prevent spread of infection. Problem: Isolation Precautions - Risk of Spread of Infection  Goal: Prevent transmission of infection  Outcome: Met This Shift     Patient respirations still elevated despite breathing treatments and oxygen via bipap or hiflo nasal cannula. Patient up in chair in highflowers position. Problem: Breathing Pattern - Ineffective  Goal: Ability to achieve and maintain a regular respiratory rate  Outcome: Ongoing     Patient call light within reach. [place] footwear on. Wheels locked on chair and bed. Assistive devices within reach.      Problem: Falls - Risk of:  Goal: Will remain free from falls  Description: Will remain free from falls  Outcome: Met This Shift  Goal: Absence of physical injury  Description: Absence of physical injury  Outcome: Met This Shift

## 2021-05-02 NOTE — PROGRESS NOTES
Hospitalist Progress Note    Patient:  Ta Home      Unit/Bed:3A-01/001-A    YOB: 1945    MRN: [id number]       Acct: [de-identified]     PCP: Nayla Bey MD    Date of Admission: 4/22/2021    Active Hospital Problems    Diagnosis Date Noted    COVID-19 [U07.1] 04/22/2021     Assessment/Plan:    1. Acute hypoxic respiratory failure 2/2 COVID-19 PNA with Superimposed Pulmonary Edema/ARDS: Initially on 5L NC, now on HFNC. Cont PO Decadron, will give Taclozumab. Cont Vit D, C, Zinc, Pepcid, Flonase. One time dose of IV Lasix 40. Prophylactic lovenox deferred (supratherapeutic INR on coumadin). Pulmonary hygiene. Supportive care. --4/24: pt on HFNC 80 FiO2 and 30 L/Min. CXR today showing worsening infiltrates/edema. Pt received Talcozumab, start on IV lasix 40 qd. Cont PO Decadron. Vit D, C, Zinc, Pepcid, Flonase. IS and Acapella. --4/25: requiring more HFNC today, up to 85 FiO2 and 60 L/Min. Cont IV Laix, PO Decadron. Vit D, C, Zinc, Pepcid, Flonase. IS and Acapella. --4/26: still on HFNC, FiO2 75 and 60 L/Min. Getting CXR today. CCM.   --4/27: on HFNC, FiO2 70 and 60 L/Min. CCM. Encourage IS and Acapella. --4/28: pt declining, requiring BiPAP. CCM.   --4/29: back on HFNC today on maximum settings, saturating 88%. Will likely need to go back on BiPAP. Pending CXR. CCM.   --4/30: pt still on maximum HFNC settings at 88-90%, may need to place back on BiPAP today. CXR showing worsening. CCM.   --5/1: using HFNC during the day and BiPAP at night. Will CCM.   --5/2: still on maximum HFNC settings during the day and BiPAP at night. CCM. No changes. 2. AST elevation: Mild. Consistent with COVID-19 infection. Trend daily.     3. NIDDM2, with hyperglycemia: A1C 7.0 in 1/2021. Metformin held. SSI initiated and Lantus 20U started. (target IP -180). POCT BG checks. Hypoglycemia protocol. Carb control diet.     4.  S/p mechanical AVR (anticoagulated on coumadin): INR 12.1 on for evaluation. Denies sick contacts.     ED course: afebrile, BP/HR nml, tachypnic (20s) with SpO2 83% on RA > 94% on 5L NC. Workup remarkable for: , BUN 28 (Cr 0.8), AST 47, CRP 6. 12. CBC nml. Lytes nml. Procal neg. BNP nml. Trop neg. D-Dimer nml. EKG: AV dual-paced with occasional PVCs. Portable CXR: CHF with increased central pulmonary vascular congestion and mild interstitial edema. Received 500 cc IVF bolus, duoneb in the ED. Hospitalist service contacted for admission. Please see A&P for additional details. Subjective: no acute events overnight. Pt sitting up in chair, on HFNC. No fevers, chills, chest pain. Pt still coughing and sob. Tolerating PO intake.      Medications:  Reviewed    Infusion Medications    sodium chloride      dextrose       Scheduled Medications    warfarin  1 mg Oral Once    dexamethasone  6 mg Oral Daily    vitamin A  6 mg Oral Daily    insulin glargine  40 Units Subcutaneous QAM AC    famotidine  20 mg Oral BID    warfarin (COUMADIN) daily dosing (placeholder)   Other RX Placeholder    furosemide  40 mg Intravenous Daily    allopurinol  300 mg Oral Daily    aspirin  81 mg Oral Daily    atorvastatin  80 mg Oral Nightly    [Held by provider] hydroCHLOROthiazide  25 mg Oral QAM    [Held by provider] losartan  25 mg Oral Daily    metoprolol tartrate  50 mg Oral BID    sodium chloride flush  10 mL Intravenous 2 times per day    glycopyrrolate-formoterol  2 puff Inhalation BID    insulin lispro  0-12 Units Subcutaneous TID WC    insulin lispro  0-6 Units Subcutaneous Nightly    ascorbic acid  1,000 mg Oral Daily    zinc sulfate  50 mg Oral Daily    fluticasone  1 spray Each Nostril Daily    Vitamin D  1,000 Units Oral Daily     PRN Meds: benzocaine-menthol, biotene, magnesium sulfate, bisacodyl, potassium chloride **OR** potassium alternative oral replacement **OR** potassium chloride, polyethylene glycol, ipratropium-albuterol, sodium chloride flush, sodium chloride, promethazine **OR** ondansetron, acetaminophen **OR** acetaminophen, glucose, dextrose, glucagon (rDNA), dextrose, melatonin      Intake/Output Summary (Last 24 hours) at 5/2/2021 1026  Last data filed at 5/2/2021 0413  Gross per 24 hour   Intake 410 ml   Output 950 ml   Net -540 ml       Diet:  DIET GENERAL; Carb Control: 3 carb choices (45 gms)/meal; Low Sodium (2 GM)  Dietary Nutrition Supplements: Frozen Oral Supplement    Exam:  BP (!) 102/59   Pulse 62   Temp 97.6 °F (36.4 °C) (Oral)   Resp 26   Ht 5' 8\" (1.727 m)   Wt 201 lb 3.2 oz (91.3 kg)   SpO2 92%   BMI 30.59 kg/m²     General appearance: Lethargic-appearing  male. Pleasant. Cooperative. NAD. On HFNC  HEENT: Normocephalic / atraumatic. Conjunctivae appear normal.  Neck: Supple. No JVD. Respiratory: Lung sounds diminished and coarse to auscultation bilaterally. No rales / rhonchi. No conversational dyspnea / accessory muscle use. Cardiovascular: RRR. Normal S1/S2. No murmurs / rubs / gallops. + Click (s/p mechanical AVR). Abdomen: Soft / non-tender / non-distended. BS present. Musculoskeletal: No cyanosis. 1+ pitting edema to BLE. Skin: Warm / dry. No pallor / diaphoresis. Neurologic: A/O x 3. Speech normal. Answers questions appropriately. No obvious focal neurologic deficits. Psychiatric: Thought content / judgment / insight appear appropriate. Peripheral pulses: Normal bilaterally. Labs:   Recent Labs     05/02/21 0232   WBC 16.7*   HGB 16.5   HCT 48.7        Recent Labs     05/02/21 0232      K 3.9      CO2 28   BUN 37*   CREATININE 0.9   CALCIUM 8.8     Recent Labs     05/02/21 0232   AST 52*   ALT 93*   BILITOT 1.0   ALKPHOS 90     Recent Labs     05/02/21 0232   INR 3.42*     No results for input(s): Marletta [place] in the last 72 hours.     Urinalysis:      Lab Results   Component Value Date    NITRU NEGATIVE 11/01/2018    BLOODU NEGATIVE 11/01/2018    GLUCOSEU NEGATIVE 11/01/2018 Radiology:   All imaging reviewed     Diet: DIET GENERAL; Carb Control: 3 carb choices (45 gms)/meal; Low Sodium (2 GM)  Dietary Nutrition Supplements: Frozen Oral Supplement      Code Status: Limited            Electronically signed by Pietro Jacobsen MD on 5/2/2021 at 10:26 AM

## 2021-05-03 NOTE — PROGRESS NOTES
BIPAP mask removed for patient to get a drink of water and to place cushion on nose. SpO2 dropped to 54%, mask was off for seconds, placed back on and patient slowly recovered to 91%.

## 2021-05-03 NOTE — PROGRESS NOTES
[address] CCU 3A  Occupational Therapy  Daily Note  Time:   Time In: 7727  Time Out: 4765  Timed Code Treatment Minutes: 32 Minutes  Minutes: 31          Date: 5/3/2021  Patient Name: Maria Teresa Bhatt,   Gender: male      Room: [facility]-A  MRN: [id number]  : 1945  (77 y.o.)  Referring Practitioner: Dr. Jovani Bazan MD  Diagnosis: COVID-19  Additional Pertinent Hx: Pt admitted with C/O feeling SOB. He tested positive for COVID approximately 10 days ago / has experienced worsening shortness of breath and fatigue since diagnosis / + decreased oral intake, watery diarrhea for the past few days. Evaluated recently in the ED () / no hypoxia / discharged on decadron/doxycycline with continued progression of symptoms. Denies fever, emesis, chest pain, palpitations, abdominal pain or urinary symptoms. SpO2 at home: 82% on RA / decided to proceed to the ED for evaluation. Restrictions/Precautions:  Restrictions/Precautions: Isolation, Fall Risk  Position Activity Restriction  Other position/activity restrictions: Droplet plus isolation; BiPap assist with mask- HFNC during mealtime.  HFNC 60L 100% /3 BiPap per RT    SUBJECTIVE: Pt sitting in chair upon arrival, pt agreeable to OT session, pt states extremely tired, RN gave verbal approval for session, RT stating placing patient on BiPap this date    PAIN: 0/10:     Vitals: Blood Pressure: 123/55   Patient on continous bipap upon arrival to room per RT switching from high flow. Patient O2 sats at 90-95 %. Upon activity patient maintaining O2 at 90-96 %. Pt requiring moderate rest break(s) to recover. Pt noted to have RR increasing to 34-40 upon movement with BUE. With pt able to recover back to 25-28 within 20-30 seconds of resting. COGNITION: WFL    ADL:   Upper Extremity Dressing: Moderate Assistance. pt able to intially thread BUE into sleeves of gown, unable to pull up rest of the way.     BALANCE:  Standing Balance: Contact Guard Assistance, X 2. to assess amy area due to pt c/o pain with pt standing 30 seconds with using RW, with RN present and wanting to assess    BED MOBILITY:  Not Tested    TRANSFERS:  Sit to Stand:  Contact Guard Assistance. x2 with pushing off of chair  Stand to Sit: Contact Guard Assistance, X 2. vc's for slow descent        ADDITIONAL ACTIVITIES:  Patient completed BUE strengthening exercises with skilled education on HEP: completed 10 reps x1 set with a 1# object with shld flexion only this date before patient c/o fatigue  in order to improve UE strength and activity tolerance required for BADL routine and toilet / shower transfers. Patient tolerated fair, requiring moderate rest breaks. Patient also required 1 cues for technique. ASSESSMENT:     Activity Tolerance:  Patient tolerance of  treatment: fair. Discharge Recommendations: Continue to assess pending progress, Patient would benefit from continued therapy after discharge   Equipment Recommendations: Equipment Needed: No  Other: May benefit from having a shower chair  Plan: Times per week: 5x  Specific instructions for Next Treatment: Functional mobility; standing ADLs; UE exercises and endurance training  Current Treatment Recommendations: Endurance Training, Functional Mobility Training, Self-Care / ADL  Plan Comment: Pt would benefit from continued skilled OT services when medically stable and discharged from Acute. HHOT recommended. Patient Education  Patient Education: Importance of Increasing Activity    Goals  Short term goals  Time Frame for Short term goals: By discharge  Short term goal 1: Pt will demonstrate functional mobility walking in his room with supervison while keeping O2 saturation > than 90% to prepare for doing sinkside grooming or toileting routine.   Short term goal 2: Pt will complete simple standing ADLs for over 5 mintues with SBA while keeping his O2 saturation > than 90% with cues as needed to increase his endurance and activity tolerance for self care and homekaking tasks. Short term goal 3: Pt will complete BUE moderate resistance exercises while following demonstration for proper breathing technique to increase his endurance and strength for ease of doing self care and homemaking. Short term goal 4: Pt will complete lower body dressing or bathing while keeping a steady pace and taking rest breaks as needed to increase his activity tolerance for ease of doing light homeaking or yardwork. Following session, patient left in safe position with all fall risk precautions in place.

## 2021-05-03 NOTE — PROGRESS NOTES
Clinical Pharmacy Note    Warfarin consult follow-up    Recent Labs     05/03/21  0223   INR 3.24*     Recent Labs     05/01/21  0349 05/02/21  0232 05/03/21  0223   HGB 15.7 16.5 17.9   HCT 45.1 48.7 52.9*    220 208       Significant Drug-Drug Interactions:  New warfarin drug-drug interactions: none  Discontinued drug-drug interactions: none  Current warfarin drug-drug interactions: allopurinol, aspirin, dexamethasone 5/1-5/6     Date INR Warfarin Dose   4/23/2021 12.13 2 mg Vitamin K IV   4/24/2021 2.11 No  warfarin   4/25/2021  Warf restart 2.01 6 mg   4/26/2021 2.06 5 mg    4/27/2021 2.85  2 mg    4/28/2021  3.32  2 mg     4/29/2021 3.55                     2 mg   4/30/2021 3.37                     3 mg    5/1/2021 3.23                      3 mg    5/2/2021  3.42                     1 mg    5/3/2021  3.24                     2.5 mg              Notes:                   Daily PT/INR until stable within therapeutic range.      Electronically signed by MILI Mitchell [facility] on 5/3/2021 at 10:04 AM

## 2021-05-03 NOTE — PROGRESS NOTES
mows a 1/2 acre with a self propelled mower. Restrictions/Precautions:  Restrictions/Precautions: Isolation, Fall Risk  Position Activity Restriction  Other position/activity restrictions: Droplet plus isolation; BiPap assist with mask- HFNC during mealtime. SUBJECTIVE: pt up in commode on arrival and his O2 sats were 85% and resp rate in 40's cues provided to take slow deep breaths prior to standing up for amy care - he needed many rest breaks     PAIN: 0/10:       Vitals: Blood Pressure: 133/57  Oxygen: bipap PEEP 6 FiO2 100%- he was 85% on arrival on commode he varied from 85-92%   Heart Rate:   resp rate- 20's-40's    OBJECTIVE:  Bed Mobility:  Not Tested    Transfers:  Sit to Stand: [employer] Assistance pt impulsive cues to slow down- noted wide base of support   Stand to Sit:Contact [employer]- pt very impulsive and didn't get squared with the chair after he recovered had him stand up again to get to center of chair     Ambulation:  Contact Guard Assistance  Distance: 2-3 feet commode to chair   Surface: Level Tile  Device:one hand hold assist   Gait Deviations:  Pt moved rather quick he needed assist to manage his lines      Balance:  static standing with UE at support with CGA while he was dependent for amy care (had pt take sitting rest breaks when his O2 would drop he would sit to recover then stand back up again - he had to do this several times to be able to complete the task)    Exercise:  Patient was guided in 1 set(s) 10 reps of exercise to both lower extremities. Ankle pumps, Glut sets, Quad sets, Heelslides and after several min of recovery from the transfer he was able to hold O2 sats > 90% but was tired he needed rest breaks between each ex . Exercises were completed for increased independence with functional mobility.     Functional Outcome Measures: Completed  AM-PAC Inpatient Mobility Raw Score : 14  AM-PAC Inpatient T-Scale Score : 38.1    ASSESSMENT:  Assessment: pts O2 sats were low even while on the bipap this date- he needed many rest breaks and cues for deep breathing - his O2 sats remained > 90% for his ex but dropped with mobility   Activity Tolerance:  Patient tolerance of  treatment: fair. Many extended rest breaks needed this date      Equipment Recommendations:Equipment Needed: No  Discharge Recommendations:    Continue to assess pending progress,     Plan: Times per week: 3-5x GM  Times per day: Daily  Current Treatment Recommendations: Strengthening, Gait Training, Patient/Caregiver Education & Training, Stair training, Equipment Evaluation, Education, & procurement, Balance Training, Functional Mobility Training, Endurance Training, Home Exercise Program, Transfer Training, Safety Education & Training    Patient Education  Patient Education: Plan of Care    Goals:  Patient goals : to go home  Short term goals  Time Frame for Short term goals: by discharge  Short term goal 1: sit <> supine with HOB flat, no rails and mod I for increased functional ind  Short term goal 2: sit <> stand with no AD from various surfaces mod I for safe transfers  Short term goal 3: ambulate 200' ft with no AD mod I for safe household ambulation  Short term goal 4: ascend/descend 8 steps with rail and mod I for safe enter/exit of home  Long term goals  Time Frame for Long term goals : NA due to short ELOS    Following session, patient left in safe position with all fall risk precautions in place.

## 2021-05-03 NOTE — PLAN OF CARE
Problem: Breathing Pattern - Ineffective  Goal: Ability to achieve and maintain a regular respiratory rate  Outcome: Ongoing     Problem:  Body Temperature -  Risk of, Imbalanced  Goal: Ability to maintain a body temperature within defined limits  Outcome: Ongoing     Problem: Isolation Precautions - Risk of Spread of Infection  Goal: Prevent transmission of infection  Outcome: Ongoing     Problem: Nutrition Deficits  Goal: Optimize nutritional status  Outcome: Ongoing     Problem: Risk for Fluid Volume Deficit  Goal: Maintain normal heart rhythm  Outcome: Ongoing     Problem: Fatigue  Goal: Verbalize increase energy and improved vitality  Outcome: Ongoing     Problem: Falls - Risk of:  Goal: Will remain free from falls  Description: Will remain free from falls  Outcome: Ongoing     Problem: Skin Integrity:  Goal: Will show no infection signs and symptoms  Description: Will show no infection signs and symptoms  Outcome: Ongoing

## 2021-05-03 NOTE — CARE COORDINATION
5/3/21, 2:53 PM EDT    DISCHARGE ON GOING [address] day: 11  Location: 3A-01/001-A Reason for admit: COVID-19 [U07.1]   Procedure:   4/29 CXR:   1. Normal heart size. Permanent dual-chamber pacemaker. Metallic sternotomy sutures. Questionable tiny effusion left side. 2. Moderate patchy infiltrates scattered throughout both lungs, consistent with pneumonia. 3. Overall appearance has worsened since yesterday     Barriers to Discharge: Remains on HFNC 60L, 100% FIO2 vs bipap with 100% FIO2. Sats 91% on bipap 12/6, 100% FIO2. Temp 94 this afternoon - bairhugger applied. AV paced. Ox4. Follows commands. Limited code - no x4. PT/OT. Dietitian and Palliative Care following. Allopurinol, vit C, asa, lipitor, po decadron, pepcid, IV lasix 40 mg daily, inhaler, lantus, SSI, nebs, lopressor, vit D, zinc, coumadin - pharmacy dosing, Electrolyte replacement protocols. Na+ 134, alb 3.1, alt 93, ast 58, wbc 20.6, ferritin 2753, INR 3.24, d-dimer 7494. PCP: Xiomara Gallardo MD  Readmission Risk Score: 22%  Patient Goals/Plan/Treatment Preferences: Home w/wife. Monitor for needs. Prefers [facility] if needs home O2.

## 2021-05-03 NOTE — PROGRESS NOTES
Hospitalist Progress Note    Patient:  Chepe Torre      Unit/Bed:3A-01/001-A    YOB: 1945    MRN: [id number]       Acct: [de-identified]     PCP: Jarrett Mullen MD    Date of Admission: 4/22/2021    Active Hospital Problems    Diagnosis Date Noted    COVID-19 [U07.1] 04/22/2021     Assessment/Plan:    1. Acute hypoxic respiratory failure 2/2 COVID-19 PNA with Superimposed Pulmonary Edema/ARDS: Initially on 5L NC, now on HFNC. Cont PO Decadron, will give Taclozumab. Cont Vit D, C, Zinc, Pepcid, Flonase. One time dose of IV Lasix 40. Prophylactic lovenox deferred (supratherapeutic INR on coumadin). Pulmonary hygiene. Supportive care. --4/24: pt on HFNC 80 FiO2 and 30 L/Min. CXR today showing worsening infiltrates/edema. Pt received Talcozumab, start on IV lasix 40 qd. Cont PO Decadron. Vit D, C, Zinc, Pepcid, Flonase. IS and Acapella. --4/25: requiring more HFNC today, up to 85 FiO2 and 60 L/Min. Cont IV Laix, PO Decadron. Vit D, C, Zinc, Pepcid, Flonase. IS and Acapella. --4/26: still on HFNC, FiO2 75 and 60 L/Min. Getting CXR today. CCM.   --4/27: on HFNC, FiO2 70 and 60 L/Min. CCM. Encourage IS and Acapella. --4/28: pt declining, requiring BiPAP. CCM.   --4/29: back on HFNC today on maximum settings, saturating 88%. Will likely need to go back on BiPAP. Pending CXR. CCM.   --4/30: pt still on maximum HFNC settings at 88-90%, may need to place back on BiPAP today. CXR showing worsening. CCM.   --5/1: using HFNC during the day and BiPAP at night. Will CCM.   --5/2: still on maximum HFNC settings during the day and BiPAP at night. CCM. No changes. --5/3: pt decompensating, currently on BiPAP and hypothermic. Wife wants pt to be transferred to [address] [place] [address] for an investigational drug (stem cell). Speaking with [facility] for possible transfer. 2. AST elevation: Mild. Consistent with COVID-19 infection. Trend daily.     3.  NIDDM2, with hyperglycemia: A1C 7.0 in 1/2021. Metformin held. SSI initiated and Lantus 20U started. (target IP -180). POCT BG checks. Hypoglycemia protocol. Carb control diet.     4. S/p mechanical AVR (anticoagulated on coumadin): INR 12.1 on arrival (target 2.5-3.5). No active bleeding assessed. Lower-dose (2 mg IV) vitamin K administered given indication for coumadin. PT/INR daily. Pharmacy to dose warfarin when resumed. Monitor clinically for signs/symptoms of bleeding. Stress ulcer prophylaxis. --4/24: INR 2.11 today. Will start on heparin drip. Pharmacy to dose coumadin. --4/25: INR 2.01 today, will resume Coumadin today. Cont Heparin drip until INR is 2.5.   --4/26: INR 2.06. Cont Coumadin and Heparin drip. --4/27: INR 2.85 today, will discontinue Heparin drip.             5. Primary HTN:  HCTZ, BB, ARB resumed. Monitor BP.     6. CAD / HLD: Daily ASA, statin resumed (AST < 3x upper limit of normal).     7. COPD (patient-reported): Without exacerbation. Never smoker. Pulmonary hygiene as above.     8. Complete heart block (s/p BIV): Noted. A-V paced on EKG.     9. Acute on Chronic systolic CHF with recovered EF (50-55% per TTE 11/2019 / s/p AICD): IV Lasix 40 qd. BB. Hold HCTZ and Cozaar. No outpatient loop diuretics listed. Monitor I/O, daily weights.     10. Gout: Without exacerbation. Allopurinol resumed. Chief Complaint: shortness of breath    Hospital Course: 76 y/o M PMHx HTN, HLD, CAD, NIDDM2, ? COPD (never-smoker), chronic systolic CHF with recovered EF (50-55% per TTE 11/2019 / s/p AICD), complete heart block (s/p BIV) and mechanical AVR (anticoagulated on coumadin) -- presents to [facility] with a chief complaint of shortness of breath. History obtained from the patient.     Patient tested positive for COVID approximately 10 days ago / has experienced worsening shortness of breath and fatigue since diagnosis / + decreased oral intake, watery diarrhea for the past few days.  Evaluated recently in the ED (4/17) / no hypoxia / discharged on decadron/doxycycline with continued progression of symptoms. Denies fever, emesis, chest pain, palpitations, abdominal pain or urinary symptoms. SpO2 at home: 82% on RA / decided to proceed to the ED for evaluation. Denies sick contacts.     ED course: afebrile, BP/HR nml, tachypnic (20s) with SpO2 83% on RA > 94% on 5L NC. Workup remarkable for: , BUN 28 (Cr 0.8), AST 47, CRP 6. 12. CBC nml. Lytes nml. Procal neg. BNP nml. Trop neg. D-Dimer nml. EKG: AV dual-paced with occasional PVCs. Portable CXR: CHF with increased central pulmonary vascular congestion and mild interstitial edema. Received 500 cc IVF bolus, duoneb in the ED. Hospitalist service contacted for admission. Please see A&P for additional details. Subjective: pt appears to be getting worse, hypothermic today. Bear hugger applied. On BiPAP. Not eating well. Pt sob, coughing. No diarrhea. No nausea or vomiting.      Medications:  Reviewed    Infusion Medications    sodium chloride      dextrose       Scheduled Medications    warfarin  2.5 mg Oral Once    ipratropium-albuterol  1 ampule Inhalation Q4H    dexamethasone  6 mg Oral Daily    vitamin A  6 mg Oral Daily    insulin glargine  40 Units Subcutaneous QAM AC    famotidine  20 mg Oral BID    warfarin (COUMADIN) daily dosing (placeholder)   Other RX Placeholder    furosemide  40 mg Intravenous Daily    allopurinol  300 mg Oral Daily    aspirin  81 mg Oral Daily    atorvastatin  80 mg Oral Nightly    [Held by provider] hydroCHLOROthiazide  25 mg Oral QAM    [Held by provider] losartan  25 mg Oral Daily    metoprolol tartrate  50 mg Oral BID    sodium chloride flush  10 mL Intravenous 2 times per day    glycopyrrolate-formoterol  2 puff Inhalation BID    insulin lispro  0-12 Units Subcutaneous TID WC    insulin lispro  0-6 Units Subcutaneous Nightly    ascorbic acid  1,000 mg Oral Daily    zinc sulfate  50 mg Oral Daily    fluticasone  1 spray Each Nostril Daily    Vitamin D  1,000 Units Oral Daily     PRN Meds: benzocaine-menthol, biotene, magnesium sulfate, bisacodyl, potassium chloride **OR** potassium alternative oral replacement **OR** potassium chloride, polyethylene glycol, sodium chloride flush, sodium chloride, promethazine **OR** ondansetron, acetaminophen **OR** acetaminophen, glucose, dextrose, glucagon (rDNA), dextrose, melatonin      Intake/Output Summary (Last 24 hours) at 5/3/2021 1310  Last data filed at 5/3/2021 8077  Gross per 24 hour   Intake 550 ml   Output 475 ml   Net 75 ml       Diet:  DIET GENERAL; Carb Control: 3 carb choices (45 gms)/meal; Low Sodium (2 GM)  Dietary Nutrition Supplements: Frozen Oral Supplement    Exam:  BP (!) 112/53   Pulse 65   Temp 94 °F (34.4 °C) (Axillary) Comment: bear hugger placed on patient  Resp 27   Ht 5' 8\" (1.727 m)   Wt 201 lb 3.2 oz (91.3 kg)   SpO2 91%   BMI 30.59 kg/m²     General appearance: Lethargic-appearing  male. Pleasant. Cooperative. NAD. On BiPAP  HEENT: Normocephalic / atraumatic. Conjunctivae appear normal.  Neck: Supple. No JVD. Respiratory: Lung sounds diminished and coarse to auscultation bilaterally. No rales / rhonchi. No conversational dyspnea / accessory muscle use. Cardiovascular: RRR. Normal S1/S2. No murmurs / rubs / gallops. + Click (s/p mechanical AVR). Abdomen: Soft / non-tender / non-distended. BS present. Musculoskeletal: No cyanosis. 1+ pitting edema to BLE. Skin: Warm / dry. No pallor / diaphoresis. Neurologic: A/O x 3. Speech normal. Answers questions appropriately. No obvious focal neurologic deficits. Psychiatric: Thought content / judgment / insight appear appropriate. Peripheral pulses: Normal bilaterally.       Labs:   Recent Labs     05/03/21 0223   WBC 20.6*   HGB 17.9   HCT 52.9*        Recent Labs     05/03/21 0223   *   K 4.1   CL 98   CO2 26   BUN 39*   CREATININE 0.8   CALCIUM 8.8     Recent Labs     05/03/21 0223   AST 58*   ALT 93*   BILITOT 1.1   ALKPHOS 123     Recent Labs     05/03/21  0223   INR 3.24*     No results for input(s): CKTOTAL, TROPONINI in the last 72 hours. Urinalysis:      Lab Results   Component Value Date    NITRU NEGATIVE 11/01/2018    BLOODU NEGATIVE 11/01/2018    GLUCOSEU NEGATIVE 11/01/2018       Radiology:   All imaging reviewed     Diet: DIET GENERAL; Carb Control: 3 carb choices (45 gms)/meal; Low Sodium (2 GM)  Dietary Nutrition Supplements: Frozen Oral Supplement      Code Status: Limited            Electronically signed by Angelina Zapata MD on 5/3/2021 at 1:10 PM

## 2021-05-04 NOTE — PROGRESS NOTES
Clinical Pharmacy Note    Warfarin consult follow-up    Recent Labs     05/04/21  0428   INR 3.03*     Recent Labs     05/02/21  0232 05/03/21  0223 05/04/21  0428   HGB 16.5 17.9 18.0   HCT 48.7 52.9* 52.9*    208 203       Significant Drug-Drug Interactions:  New warfarin drug-drug interactions: none  Discontinued drug-drug interactions: none  Current warfarin drug-drug interactions: allopurinol, aspirin, dexamethasone 5/1-5/6, vit C 1000mg     Date INR Warfarin Dose   4/23/2021 12.13 2 mg Vitamin K IV   4/24/2021 2.11 No  warfarin   4/25/2021  Warf restart 2.01 6 mg   4/26/2021 2.06 5 mg    4/27/2021 2.85  2 mg    4/28/2021  3.32  2 mg     4/29/2021 3.55                     2 mg   4/30/2021 3.37                     3 mg    5/1/2021 3.23                      3 mg    5/2/2021  3.42                     1 mg    5/3/2021  3.24                     2.5 mg    5/4/2021  3.03   2mg     Notes:                   Daily PT/INR until stable within therapeutic range. Johnnette Gaucher [facility]. D., BCPS, BCCCP 5/4/2021 5:45 PM

## 2021-05-04 NOTE — FLOWSHEET NOTE
Pt was in bed as his wife was visiting him at the covid floor as he was dealing with covid. He was not able to talk but he was blessed. 05/04/21 1444   Encounter Summary   Services provided to: Patient and family together   Referral/Consult From: Rounding   Support System Spouse   Continue Visiting Yes  (5/4 P)   Complexity of Encounter Low   Length of Encounter 15 minutes   Routine   Type Follow up   Assessment Approachable;Calm   Intervention Prayer;[place];Nurtured hope; Active listening;Empowerment;Sustaining presence/ Ministry of presence   Outcome Acceptance;Expressed gratitude;Encouraged; Hopeful;Receptive

## 2021-05-04 NOTE — PLAN OF CARE
Problem: Breathing Pattern - Ineffective  Goal: Ability to achieve and maintain a regular respiratory rate  5/4/2021 0220 by Rich Salgado RN  Outcome: Ongoing  Note: Respiratory therapy adjusting bipap per order, will monitor patient. 5/4/2021 0106 by Rich Salgado RN  Outcome: Ongoing  5/4/2021 0025 by Rich Salgado RN  Outcome: Ongoing  Note: BIPAP settings changed, patient tolerating well although spO2 drops significantly if patient needs water or pills. Problem: Body Temperature -  Risk of, Imbalanced  Goal: Ability to maintain a body temperature within defined limits  5/4/2021 0220 by Rich Salgado RN  Outcome: Ongoing  5/4/2021 0025 by Rich Salgado RN  Outcome: Ongoing  Note: Hypothermic today, during this shift body temperature has maintained normal limits. Problem: Isolation Precautions - Risk of Spread of Infection  Goal: Prevent transmission of infection  5/4/2021 0220 by Rich Salgado RN  Outcome: Ongoing  5/4/2021 0025 by Rich Salgado RN  Outcome: Ongoing     Problem: Nutrition Deficits  Goal: Optimize nutritional status  5/4/2021 0220 by Rich Salgado RN  Outcome: Ongoing  5/4/2021 0025 by Rich Salgado RN  Outcome: Ongoing     Problem: Risk for Fluid Volume Deficit  Goal: Maintain normal heart rhythm  5/4/2021 0220 by Rich Salgado RN  Outcome: Ongoing  Note: Monitoring patient on tele continuously, T wave elevated. EKG and labs obtained which were WNL. 5/4/2021 0025 by Rich Salgado RN  Outcome: Ongoing  Note: T wave became peaked, EKG and Labs obtained.       Problem: Fatigue  Goal: Verbalize increase energy and improved vitality  Outcome: Ongoing     Problem: Falls - Risk of:  Goal: Will remain free from falls  Description: Will remain free from falls  Outcome: Ongoing

## 2021-05-04 NOTE — PROGRESS NOTES
Contacted [employer]	Emmons, PA-C about change in BiPAP settings due to pt low SpO2. SpO2 was 88-89% on BiPAP settings of 18/8 and 100%. Patient is a Do Not Intubate. Per [employer]	Emmons, PA-C, BiPAP settings were to be increased to 22/8 and 100%. On those settings, pt tidal volumes did improve from 300s to 500-600s and RR decreased from 34 to 22. However, SpO2 did not improve. [employer]	Emmons, PA-C notified. No change in BiPAP settings at this time and pt tolerating well on settings of 22/8 and 100%.

## 2021-05-04 NOTE — PROGRESS NOTES
Comprehensive Nutrition Assessment    Type and Reason for Visit:  Reassess(PO monitor)    Nutrition Recommendations/Plan:   If trophic enteral feeding initiated, recommend Vital 1.2 at 10ml/ hour. PO diet and ONS (Magic Cup TID) when status allows. Vitamin supplementation as per MD.    Nutrition Assessment:    Pt. Decline from nutritional standpoint AEB on Bipap and unable to remove for po intake. At risk for further nutritionally compromised related to reported poor intake and diarrhea ~1 week pta; poor po since admit (day 12), admit (+) covid  and underlying medical condition HTN, DM (A1c 7% Jan 2021), CAD, AVR.  Nutrition recommendations/interventions as per above. Malnutrition Assessment:  Malnutrition Status: At risk for malnutrition (Comment)    Context:  Acute Illness       Estimated Daily Nutrient Needs:  Energy (kcal):  8535-5740 (30-32/kgm IBW) late phase; Weight Used for Energy Requirements:  Ideal(154# - 70kgm)     Protein (g):  ~140 gms (2/kgm IBW); Weight Used for Protein Requirements:  Ideal        Fluid (ml/day):  per MD;     Nutrition Related Findings:  covid Dx 4/13; on Bipap and unable to remove for po intake per RN, note intake 1-25% meals 5/3, previous report of liking magic cup supplements, BM x 1 (5/2); vitamin C, vitamin A, vitamin D, zinc, decadron, lasix, lantus, humalog; BUN 43, Creatinine 0.9, glucose 93      Wounds:  None       Current Nutrition Therapies:    DIET GENERAL; Carb Control: 3 carb choices (45 gms)/meal; Low Sodium (2 GM)  Dietary Nutrition Supplements: Frozen Oral Supplement    Anthropometric Measures:  · Height: 5' 8\" (172.7 cm)  · Current Body Weight: 209 lb 6.4 oz (95 kg)(5/4; +2 pitting edema)   · Admission Body Weight: 204 lb (92.5 kg)(4/23 with no edema)    · Usual Body Weight: 210 lb (95.3 kg)(per pt.; Epic wt. records of unknown source)     · Ideal Body Weight: 154 lbs;   · BMI: 31.8  · BMI Categories: Obese Class 1 (BMI 30.0-34. 9)       Nutrition Diagnosis:   · Inadequate oral intake related to catabolic illness(covid) as evidenced by intake 0-25%, intake 26-50%      Nutrition Interventions:   Food and/or Nutrient Delivery:  Continue Current Diet, Continue Oral Nutrition Supplement, Mineral Supplement, Vitamin Supplement  Nutrition Education/Counseling:  Education initiated(4/23 encouraged continued low sodium diet; yogurt for probiotics; discussed ONS use) 5/4: education not appropriate   Coordination of Nutrition Care:  Continue to monitor while inpatient, Interdisciplinary Rounds    Goals:  pt. to consume greater than 75% of meals and ONS during LOS       Nutrition Monitoring and Evaluation:   Behavioral-Environmental Outcomes:  None Identified   Food/Nutrient Intake Outcomes:  Food and Nutrient Intake, Supplement Intake  Physical Signs/Symptoms Outcomes:  Biochemical Data, GI Status, Fluid Status or Edema, Hemodynamic Status, Skin, Weight     Discharge Planning:     Too soon to determine     Electronically signed by Kody Pina, LEVI, LD on 5/4/21 at 2:28 PM EDT    Contact: [phone]

## 2021-05-04 NOTE — PLAN OF CARE
Problem: Breathing Pattern - Ineffective  Goal: Ability to achieve and maintain a regular respiratory rate  Outcome: Ongoing  Note: Remains on bipap at 100% fiO2, settings adjusted per RT     Problem: Body Temperature -  Risk of, Imbalanced  Goal: Ability to maintain a body temperature within defined limits  Outcome: Ongoing  Note: Normal temp maintained     Problem: Isolation Precautions - Risk of Spread of Infection  Goal: Prevent transmission of infection  Outcome: Ongoing  Note: Isolation precautions maintained     Problem: Nutrition Deficits  Goal: Optimize nutritional status  5/4/2021 1803 by Finn Blankenship RN  Outcome: Ongoing  Note: Poor appetite, inability to maintain O2 levels to eat  5/4/2021 1428 by Cortez Mera RD, LD  Outcome: Ongoing     Problem: Risk for Fluid Volume Deficit  Goal: Maintain normal heart rhythm  Outcome: Ongoing  Note: Paced on telemetry, underlying SR with PVC's, monitor     Problem: Fatigue  Goal: Verbalize increase energy and improved vitality  Outcome: Ongoing  Note: Patient arousable to voice throughout the day     Problem: Patient Education: Go to Patient Education Activity  Goal: Patient/Family Education  Outcome: Ongoing     Problem: Falls - Risk of:  Goal: Will remain free from falls  Description: Will remain free from falls  Outcome: Ongoing  Note: Appropriate fall precautions maintained  Goal: Absence of physical injury  Description: Absence of physical injury  Outcome: Ongoing     Problem: Discharge Planning:  Goal: Discharged to appropriate level of care  Description: Discharged to appropriate level of care  Outcome: Ongoing     Problem: Skin Integrity:  Goal: Will show no infection signs and symptoms  Description: Will show no infection signs and symptoms  Outcome: Ongoing  Note: Katherine Silva turns as patient allows, continue to educate importance of repositioning, sacral dressing in place.    Goal: Absence of new skin breakdown  Description: Absence of new skin breakdown  Outcome: Ongoing      Blood cultures drawn, repeat lactic slightly improved, broad spectrum antibiotics started. Continue to monitor respiratory status closely.

## 2021-05-04 NOTE — PROGRESS NOTES
Physician Progress Note      PATIENT:               Srinivasan Landon  CSN #:                  [id number]  :                       1945  ADMIT DATE:       2021 7:43 PM  [address] [place] DATE:  RESPONDING  PROVIDER #:        Kemar Ames MD          QUERY TEXT:    Pt admitted with COVID-19 and noted to have developed hypothermia, lethargy ,   tachypnea with acute resp failure, lactic acid 3.7, WBC up to 24.7, and Covid   19 related Pneumonia. If possible, please make selection below, document in   progress notes and discharge summary if you are evaluating and/or treating: The medical record reflects the following:  Risk Factors: Covid 19, Covid 19 associated Pneumonia, acute on chronic   comorbid health conditions  Clinical Indicators: development of  hypothermia, lethargy , tachypnea with   acute resp failure, lactic acid 3.7, WBC up to 24.7, and Covid 19 related   Pneumonia  Treatment: admission, imaging, labs, Oxygen support with High flow Nasal   cannula and Cpap - maxed , barehugger for hypothermia, Talcozumab, start on IV   lasix 40 qd. Cont PO Decadron. Vit D, C, Zinc, Pepcid, Flonase. IS and   Acapella, possible transfer to higher level of care , treatment of underlying   Covid 19 and associated pneumonia    Thank you,  Rodney Lundborg  RN, BSN, CRCR  Clinical   P: [phone]  F: [phone]  Options provided:  -- Sepsis not present on admission due to COVID-19 infection  -- Sepsis not present on admission due to COVID-19 pneumonia  -- Covid-19 pneumonia without sepsis  -- Other - I will add my own diagnosis  -- Disagree - Not applicable / Not valid  -- Disagree - Clinically unable to determine / Unknown  -- Refer to Clinical Documentation Reviewer    PROVIDER RESPONSE TEXT:    This patient has sepsis which was not present on admission due to COVID-19   infection.     Query created by: Alma Delia Rodríguez on 2021 2:10 PM      Electronically signed by:  Kemar Ames MD 2021 2:16 PM

## 2021-05-04 NOTE — PROGRESS NOTES
Lianet Casiano 61  PHYSICAL THERAPY MISSED TREATMENT NOTE  [facility]A    Date: 2021  Patient Name: Gregorio Canchola        MRN: [id number]   : 1945  (77 y.o.)  Gender: male   Referring Practitioner: Moraima Ramirez MD  Diagnosis: COVID-19         REASON FOR MISSED TREATMENT:  Nursing recommended to hold therapy this am due to poor resp status, will check back next available date .

## 2021-05-04 NOTE — PROGRESS NOTES
showing pH 7.47, CO2 36, PO2 62. Spoke with both patient and wife, patient does want to be intubated. Poor prognosis. 2. AST elevation: Mild. Consistent with COVID-19 infection. Trend daily.     3. NIDDM2, with hyperglycemia: A1C 7.0 in 1/2021. Metformin held. SSI initiated and Lantus 20U started. (target IP -180). POCT BG checks. Hypoglycemia protocol. Carb control diet.     4. S/p mechanical AVR (anticoagulated on coumadin): INR 12.1 on arrival (target 2.5-3.5). No active bleeding assessed. Lower-dose (2 mg IV) vitamin K administered given indication for coumadin. PT/INR daily. Pharmacy to dose warfarin when resumed. Monitor clinically for signs/symptoms of bleeding. Stress ulcer prophylaxis. --4/24: INR 2.11 today. Will start on heparin drip. Pharmacy to dose coumadin. --4/25: INR 2.01 today, will resume Coumadin today. Cont Heparin drip until INR is 2.5.   --4/26: INR 2.06. Cont Coumadin and Heparin drip. --4/27: INR 2.85 today, will discontinue Heparin drip.             5. Primary HTN:  HCTZ, BB, ARB resumed. Monitor BP.     6. CAD / HLD: Daily ASA, statin resumed (AST < 3x upper limit of normal).     7. COPD (patient-reported): Without exacerbation. Never smoker. Pulmonary hygiene as above.     8. Complete heart block (s/p BIV): Noted. A-V paced on EKG.     9. Acute on Chronic systolic CHF with recovered EF (50-55% per TTE 11/2019 / s/p AICD): IV Lasix 40 qd. BB. Hold HCTZ and Cozaar. No outpatient loop diuretics listed. Monitor I/O, daily weights.     10. Gout: Without exacerbation. Allopurinol resumed. Chief Complaint: shortness of breath    Hospital Course: 78 y/o M PMHx HTN, HLD, CAD, NIDDM2, ? COPD (never-smoker), chronic systolic CHF with recovered EF (50-55% per TTE 11/2019 / s/p AICD), complete heart block (s/p BIV) and mechanical AVR (anticoagulated on coumadin) -- presents to [facility] with a chief complaint of shortness of breath.  History obtained from the patient.     Patient tested positive for COVID approximately 10 days ago / has experienced worsening shortness of breath and fatigue since diagnosis / + decreased oral intake, watery diarrhea for the past few days. Evaluated recently in the ED (4/17) / no hypoxia / discharged on decadron/doxycycline with continued progression of symptoms. Denies fever, emesis, chest pain, palpitations, abdominal pain or urinary symptoms. SpO2 at home: 82% on RA / decided to proceed to the ED for evaluation. Denies sick contacts.     ED course: afebrile, BP/HR nml, tachypnic (20s) with SpO2 83% on RA > 94% on 5L NC. Workup remarkable for: , BUN 28 (Cr 0.8), AST 47, CRP 6. 12. CBC nml. Lytes nml. Procal neg. BNP nml. Trop neg. D-Dimer nml. EKG: AV dual-paced with occasional PVCs. Portable CXR: CHF with increased central pulmonary vascular congestion and mild interstitial edema. Received 500 cc IVF bolus, duoneb in the ED. Hospitalist service contacted for admission. Please see A&P for additional details. Subjective: pt continues to decline today, tachypenic on BiPAP. Not eating. Hypothermic. Spoke with both pt and wife, code status discussed and want to be limited code x 4.      Medications:  Reviewed    Infusion Medications    sodium chloride      dextrose       Scheduled Medications    piperacillin-tazobactam  3,375 mg Intravenous Q8H    ipratropium-albuterol  1 ampule Inhalation Q4H    dexamethasone  6 mg Oral Daily    vitamin A  6 mg Oral Daily    insulin glargine  40 Units Subcutaneous QAM AC    famotidine  20 mg Oral BID    warfarin (COUMADIN) daily dosing (placeholder)   Other RX Placeholder    furosemide  40 mg Intravenous Daily    allopurinol  300 mg Oral Daily    aspirin  81 mg Oral Daily    atorvastatin  80 mg Oral Nightly    [Held by provider] hydroCHLOROthiazide  25 mg Oral QAM    [Held by provider] losartan  25 mg Oral Daily    metoprolol tartrate  50 mg Oral BID    sodium chloride flush  10 mL bilaterally. Labs:   Recent Labs     05/04/21 0428   WBC 24.7*   HGB 18.0   HCT 52.9*        Recent Labs     05/04/21 0428      K 4.0   CL 96*   CO2 27   BUN 43*   CREATININE 0.9   CALCIUM 8.8     Recent Labs     05/04/21 0428   AST 62*   ALT 74*   BILITOT 1.4*   ALKPHOS 161*     Recent Labs     05/04/21 0428   INR 3.03*     No results for input(s): Kimberlee Councilman in the last 72 hours. Urinalysis:      Lab Results   Component Value Date    NITRU NEGATIVE 11/01/2018    BLOODU NEGATIVE 11/01/2018    GLUCOSEU NEGATIVE 11/01/2018       Radiology:   All imaging reviewed     Diet: DIET GENERAL; Carb Control: 3 carb choices (45 gms)/meal; Low Sodium (2 GM)  Dietary Nutrition Supplements: Frozen Oral Supplement      Code Status: Limited            Electronically signed by Taj Bullcok MD on 5/4/2021 at 11:19 AM

## 2021-05-04 NOTE — FLOWSHEET NOTE
Gave patient full bath, patient remained on bipap for duration. Tele shows new peaked T waves, [employer]	Reading notified via secure message. New orders for EKG and lab work stat. Patient denies chest pain at this time.

## 2021-05-04 NOTE — PLAN OF CARE
Problem: Nutrition Deficits  Goal: Optimize nutritional status  5/4/2021 1428 by Kody Pina, RD, LD  Outcome: Ongoing   Nutrition Problem #1: Inadequate oral intake  Intervention: Food and/or Nutrient Delivery: Continue Current Diet, Continue Oral Nutrition Supplement, Mineral Supplement, Vitamin Supplement  Nutritional Goals: pt. to consume greater than 75% of meals and ONS during LOS

## 2021-05-04 NOTE — PROGRESS NOTES
[address] [place] [employer] THERAPY MISSED TREATMENT NOTE  [facility] CCU 3A  3A-01/001-A      Date: 2021  Patient Name: Jolene Kaur        CSN: [id number]   : 1945  (77 y.o.)  Gender: male   Referring Practitioner: Dr. Luz Maria Garcia MD  Diagnosis: COVID-19         REASON FOR MISSED TREATMENT: Hold Treatment per Nursing Patient is not medically stable for treatment per RN. Will attempt at another time.

## 2021-05-05 NOTE — PROGRESS NOTES
This RN and Trinity Mondragon, RN at bedside with family. 1740- patient absent of breath sounds and heart tones, verified by this RN and Trinity Mondragon RN. Dr. Marlo Bryson notified. House supervisor called. Patient's family remains at bedside.

## 2021-05-05 NOTE — PROGRESS NOTES
[facility]  Notice of Patient Passing      Patient Name- Valeriy Commander Number- [de-identified]   Attending Physician- Chidi Rosario MD    Admitted on-4/22/2021  7:43 PM     On 5/5/2021 at [id number] patient was found in 3A01 with:   Absence of vital signs. Absence of neurological response. Confirmed time of death at [id number]. Physician or On-call Physician notified of time of death- yes    Family present at time of death- yes   Spiritual care present at time of death- no    Physician was notified and orders were obtained to release the body. Post-Mortem documentation completed; form printed, signed, and given to admitting.     Roxie Schirmer RN Nursing Supervisor/ Manager  5/5/21   3:19 AM

## 2021-05-05 NOTE — PROGRESS NOTES
This RN assisted primary nurse Hu Blue in conversation with patient about code status change. Patient alert and oriented and stated that he \"is done and would like to just be comfortable. \" This RN called wife Ashish Bush and spoke with her about patient's wishes. Wife Ashish Bush, stated \"I will call my children and be up as soon as I can\" Patient's wife was understanding and all questions answered at this time. [facility] Marium MCDUFFIE notified and will be up to see patient and family once family arrives.

## 2021-05-05 NOTE — PROGRESS NOTES
Patient's family states that patient is \"getting restless and uncomfortable\"   4 mg IV morphine administered per families request and Dr. Goodman Lala order. Patient resting comfortably in bed with family at bedside. No further requests or concerns per family. Will continue to assess and monitor patient per orders.

## 2021-05-05 NOTE — FLOWSHEET NOTE
05/04/21 2251   Provider Notification   Reason for Communication Patient request  (Code Status)   Provider Name [facility]   Provider Notification Advance Practice Clinician (CNS, NP, CNM, CRNA, PA)   Method of Communication Secure Message   Response Waiting for response   Notification Time [id number]-patient is maxed out on bipap and is a no x4. Patient states that he would like to transition into hospice tonight and states he \"is tired and wants to go to [facility]\" He would like his wife to be notified and wants her to be here with him and would like the bipap mask to be removed and is refusing all medications. He does not want to wait until the morning. Is there any chance that you could come speak to him so we can follow through with his wishes?

## 2021-05-05 NOTE — PROGRESS NOTES
65- Dr Alanis Burrell at bedside to discuss plan of care with family and patient. Patient and family in agreement that they will move forward with comfort care per patient wishes. 0030- Orders for comfort care and comfort medications placed per Dr Alanis Burrell. 1- Family and patient stated that they were ready to move forward with comfort care. Morphine and Ativan given per order and bipap mask removed by Dr Alanis Burrell and applied 4L NC for comfort. 0100- Patient becoming restless and agitated, along with increased work of breathing and tachypnea. Orders to given morphine. See MAR.    0215- Patient SOB and tachypnea worsening and patient thrashing in bed. Orders to give 4mg versed to aid in making patient comfortable per his wishes. 9455- Patient resting comfortably in bed with family present at bedside. No further questions or concerns from family at this time.

## 2021-05-09 LAB
BLOOD CULTURE, ROUTINE: NORMAL
BLOOD CULTURE, ROUTINE: NORMAL

## 2021-08-12 NOTE — PROGRESS NOTES
Heparin drip until INR is 2.5.   --4/26: INR 2.06. Cont Coumadin and Heparin drip. --4/27: INR 2.85 today, will discontinue Heparin drip.           5. Primary HTN:  HCTZ, BB, ARB resumed. Monitor BP.     6. CAD / HLD: Daily ASA, statin resumed (AST < 3x upper limit of normal).     7. COPD (patient-reported): Without exacerbation. Never smoker. Pulmonary hygiene as above.     8. Complete heart block (s/p BIV): Noted. A-V paced on EKG.     9. Acute on Chronic systolic CHF with recovered EF (50-55% per TTE 11/2019 / s/p AICD): IV Lasix 40 qd. BB. Hold HCTZ and Cozaar. No outpatient loop diuretics listed. Monitor I/O, daily weights.     10. Gout: Without exacerbation. Allopurinol resumed. Chief Complaint: shortness of breath    Hospital Course: 78 y/o M PMHx HTN, HLD, CAD, NIDDM2, ? COPD (never-smoker), chronic systolic CHF with recovered EF (50-55% per TTE 11/2019 / s/p AICD), complete heart block (s/p BIV) and mechanical AVR (anticoagulated on coumadin) -- presents to [facility] with a chief complaint of shortness of breath. History obtained from the patient.     Patient tested positive for COVID approximately 10 days ago / has experienced worsening shortness of breath and fatigue since diagnosis / + decreased oral intake, watery diarrhea for the past few days. Evaluated recently in the ED (4/17) / no hypoxia / discharged on decadron/doxycycline with continued progression of symptoms. Denies fever, emesis, chest pain, palpitations, abdominal pain or urinary symptoms. SpO2 at home: 82% on RA / decided to proceed to the ED for evaluation. Denies sick contacts.     ED course: afebrile, BP/HR nml, tachypnic (20s) with SpO2 83% on RA > 94% on 5L NC. Workup remarkable for: , BUN 28 (Cr 0.8), AST 47, CRP 6. 12. CBC nml. Lytes nml. Procal neg. BNP nml. Trop neg. D-Dimer nml. EKG: AV dual-paced with occasional PVCs. Portable CXR: CHF with increased central pulmonary vascular congestion and mild interstitial edema. Received 500 cc IVF bolus, duoneb in the ED. Hospitalist service contacted for admission. Please see A&P for additional details. Subjective: no acute events overnight. Pt sitting up in chair, continues to be frustrated and sad. No fevers, chills, chest pain. Pt still coughing and sob. Not eating well. On BiPAP.      Medications:  Reviewed    Infusion Medications    sodium chloride      dextrose       Scheduled Medications    warfarin  2 mg Oral Once    insulin glargine  40 Units Subcutaneous QAM AC    famotidine  20 mg Oral BID    warfarin (COUMADIN) daily dosing (placeholder)   Other RX Placeholder    furosemide  40 mg Intravenous Daily    allopurinol  300 mg Oral Daily    aspirin  81 mg Oral Daily    atorvastatin  80 mg Oral Nightly    [Held by provider] hydroCHLOROthiazide  25 mg Oral QAM    [Held by provider] losartan  25 mg Oral Daily    metoprolol tartrate  50 mg Oral BID    sodium chloride flush  10 mL Intravenous 2 times per day    glycopyrrolate-formoterol  2 puff Inhalation BID    insulin lispro  0-12 Units Subcutaneous TID WC    insulin lispro  0-6 Units Subcutaneous Nightly    ascorbic acid  1,000 mg Oral Daily    zinc sulfate  50 mg Oral Daily    fluticasone  1 spray Each Nostril Daily    Vitamin D  1,000 Units Oral Daily    dexamethasone  6 mg Oral Daily     PRN Meds: bisacodyl, potassium chloride **OR** potassium alternative oral replacement **OR** potassium chloride, polyethylene glycol, ipratropium-albuterol, sodium chloride flush, sodium chloride, promethazine **OR** ondansetron, acetaminophen **OR** acetaminophen, glucose, dextrose, glucagon (rDNA), dextrose, melatonin      Intake/Output Summary (Last 24 hours) at 4/28/2021 1139  Last data filed at 4/28/2021 1029  Gross per 24 hour   Intake 1210 ml   Output 1275 ml   Net -65 ml       Diet:  DIET GENERAL; Carb Control: 3 carb choices (45 gms)/meal; Low Sodium (2 GM)  Dietary Nutrition Supplements: Frozen Oral Supplement    Exam:  /64   Pulse 69   Temp 96.3 °F (35.7 °C) (Axillary)   Resp (!) 33   Ht 5' 8\" (1.727 m)   Wt 198 lb 12.8 oz (90.2 kg)   SpO2 93%   BMI 30.23 kg/m²     General appearance: Lethargic-appearing  male. Pleasant. Cooperative. NAD. On BiPAP  HEENT: Normocephalic / atraumatic. Conjunctivae appear normal.  Neck: Supple. No JVD. Respiratory: Lung sounds diminished and coarse to auscultation bilaterally. No rales / rhonchi. No conversational dyspnea / accessory muscle use. Cardiovascular: RRR. Normal S1/S2. No murmurs / rubs / gallops. + Click (s/p mechanical AVR). Abdomen: Soft / non-tender / non-distended. BS present. Musculoskeletal: No cyanosis. 1+ pitting edema to BLE. Skin: Warm / dry. No pallor / diaphoresis. Neurologic: A/O x 3. Speech normal. Answers questions appropriately. No obvious focal neurologic deficits. Psychiatric: Thought content / judgment / insight appear appropriate. Peripheral pulses: Normal bilaterally. Labs:   Recent Labs     04/28/21 0526   WBC 11.0*   HGB 17.5   HCT 51.7        Recent Labs     04/26/21 0331 04/26/21 0331 04/28/21 0526      < > 137   K 3.3*   < > 4.0   CL 99   < > 99   CO2 24   < > 26   BUN 46*   < > 48*   CREATININE 0.8   < > 0.8   CALCIUM 9.1   < > 9.3   PHOS 4.0  --   --     < > = values in this interval not displayed. Recent Labs     04/28/21 0526   AST 54*   *   BILITOT 1.1   ALKPHOS 63     Recent Labs     04/28/21 0526   INR 3.32*     No results for input(s): CKTOTAL, TROPONINI in the last 72 hours. Urinalysis:      Lab Results   Component Value Date    NITRU NEGATIVE 11/01/2018    BLOODU NEGATIVE 11/01/2018    GLUCOSEU NEGATIVE 11/01/2018       Radiology:   All imaging reviewed     Diet: DIET GENERAL; Carb Control: 3 carb choices (45 gms)/meal; Low Sodium (2 GM)  Dietary Nutrition Supplements: Frozen Oral Supplement      Code Status: Limited            Electronically signed by Jose Francisco Orozco Tolu Head MD on 4/28/2021 at 11:39 AM 12-Aug-2021 13:39 12-Aug-2021 13:51

## 2021-09-13 NOTE — TELEPHONE ENCOUNTER
Patient LM on nurse line. LM for patient to call our office back. Patient understands condition, prognosis and need for follow up care.

## 2022-05-27 NOTE — PROGRESS NOTES
[address] CCU 3A  Occupational Therapy  Daily Note  Time:   Time In: 1410  Time Out: 1433  Timed Code Treatment Minutes: 23 Minutes  Minutes: 23          Date: 2021  Patient Name: Abdifatah Samuels,   Gender: male      Room: [facility]001-A  MRN: [id number]  : 1945  (77 y.o.)  Referring Practitioner: Dr. Christopher Barclay MD  Diagnosis: COVID-19  Additional Pertinent Hx: Pt admitted with C/O feeling SOB. He tested positive for COVID approximately 10 days ago / has experienced worsening shortness of breath and fatigue since diagnosis / + decreased oral intake, watery diarrhea for the past few days. Evaluated recently in the ED () / no hypoxia / discharged on decadron/doxycycline with continued progression of symptoms. Denies fever, emesis, chest pain, palpitations, abdominal pain or urinary symptoms. SpO2 at home: 82% on RA / decided to proceed to the ED for evaluation. Restrictions/Precautions:  Restrictions/Precautions: Isolation, Fall Risk  Position Activity Restriction  Other position/activity restrictions: Droplet plus isolation; BiPap assist with mask- HFNC during mealtime.  HFNC 60L 100%     SUBJECTIVE: Nurse [address] [employer] ok'd session, Up in chair upon arrival, agreeable to OT session    PAIN: 0/10:     Vitals:   Patient on 60 L O2 at Fi02 at 100% via High Flow  upon arrival to room. Patient O2 sats at 90 %. Upon activity patient dropping O2 at 84 %. Pt requiring lengthy rest break(s) to recover. COGNITION: WFL    ADL:   Feeding: Modified Independent. Able to open containers  Lower Extremity Dressing: with set-up. able to toño B slipper socks in chair. ADDITIONAL ACTIVITIES:  Guided patient through BUE AROM this date x10 reps x1 set in chair in order to increase BUE strength and improve activity tolerance for ADLs and homemaking tasks. Lengthy rest breaks due to low pulse ox       ASSESSMENT:     Activity Tolerance:  Patient tolerance of  treatment: fair.  Limited by breathing      Discharge Recommendations: Continue to assess pending progress, Patient would benefit from continued therapy after discharge   Equipment Recommendations: Equipment Needed: No  Other: May benefit from having a shower chair  Plan: Times per week: 5x  Specific instructions for Next Treatment: Functional mobility; standing ADLs; UE exercises and endurance training  Current Treatment Recommendations: Endurance Training, Functional Mobility Training, Self-Care / ADL  Plan Comment: Pt would benefit from continued skilled OT services when medically stable and discharged from Acute. HHOT recommended. Patient Education  Patient Education: Home Exercise Program    Goals  Short term goals  Time Frame for Short term goals: By discharge  Short term goal 1: Pt will demonstrate functional mobility walking in his room with supervison while keeping O2 saturation > than 90% to prepare for doing sinkside grooming or toileting routine. Short term goal 2: Pt will complete simple standing ADLs for over 5 mintues with SBA while keeping his O2 saturation > than 90% with cues as needed to increase his endurance and activity tolerance for self care and homekaking tasks. Short term goal 3: Pt will complete BUE moderate resistance exercises while following demonstration for proper breathing technique to increase his endurance and strength for ease of doing self care and homemaking. Short term goal 4: Pt will complete lower body dressing or bathing while keeping a steady pace and taking rest breaks as needed to increase his activity tolerance for ease of doing light homeaking or yardwork. Following session, patient left in safe position with all fall risk precautions in place. No